# Patient Record
Sex: FEMALE | Race: BLACK OR AFRICAN AMERICAN | Employment: FULL TIME | URBAN - METROPOLITAN AREA
[De-identification: names, ages, dates, MRNs, and addresses within clinical notes are randomized per-mention and may not be internally consistent; named-entity substitution may affect disease eponyms.]

---

## 2018-06-29 ENCOUNTER — HOSPITAL ENCOUNTER (EMERGENCY)
Age: 33
Discharge: HOME | End: 2018-06-29
Attending: EMERGENCY MEDICINE
Payer: COMMERCIAL

## 2018-06-29 ENCOUNTER — APPOINTMENT (OUTPATIENT)
Dept: GENERAL RADIOLOGY | Age: 33
End: 2018-06-29
Attending: PHYSICIAN ASSISTANT
Payer: COMMERCIAL

## 2018-06-29 VITALS
SYSTOLIC BLOOD PRESSURE: 110 MMHG | TEMPERATURE: 98.1 F | OXYGEN SATURATION: 100 % | RESPIRATION RATE: 18 BRPM | DIASTOLIC BLOOD PRESSURE: 62 MMHG | HEART RATE: 88 BPM | BODY MASS INDEX: 47.98 KG/M2 | WEIGHT: 288 LBS | HEIGHT: 65 IN

## 2018-06-29 DIAGNOSIS — R42 VERTIGO: Primary | ICD-10-CM

## 2018-06-29 DIAGNOSIS — R55 NEAR SYNCOPE: ICD-10-CM

## 2018-06-29 LAB
ALBUMIN SERPL-MCNC: 3.1 G/DL (ref 3.4–5)
ALBUMIN/GLOB SERPL: 0.8 {RATIO} (ref 0.8–1.7)
ALP SERPL-CCNC: 78 U/L (ref 45–117)
ALT SERPL-CCNC: 24 U/L (ref 13–56)
ANION GAP SERPL CALC-SCNC: 3 MMOL/L (ref 3–18)
APPEARANCE UR: CLEAR
AST SERPL-CCNC: 18 U/L (ref 15–37)
BASOPHILS # BLD: 0 K/UL (ref 0–0.06)
BASOPHILS NFR BLD: 0 % (ref 0–2)
BILIRUB SERPL-MCNC: 0.1 MG/DL (ref 0.2–1)
BILIRUB UR QL: NEGATIVE
BUN SERPL-MCNC: 12 MG/DL (ref 7–18)
BUN/CREAT SERPL: 15 (ref 12–20)
CALCIUM SERPL-MCNC: 8.7 MG/DL (ref 8.5–10.1)
CHLORIDE SERPL-SCNC: 105 MMOL/L (ref 100–108)
CO2 SERPL-SCNC: 30 MMOL/L (ref 21–32)
COLOR UR: YELLOW
CREAT SERPL-MCNC: 0.82 MG/DL (ref 0.6–1.3)
DIFFERENTIAL METHOD BLD: ABNORMAL
EOSINOPHIL # BLD: 0 K/UL (ref 0–0.4)
EOSINOPHIL NFR BLD: 1 % (ref 0–5)
ERYTHROCYTE [DISTWIDTH] IN BLOOD BY AUTOMATED COUNT: 12.6 % (ref 11.6–14.5)
GLOBULIN SER CALC-MCNC: 4.1 G/DL (ref 2–4)
GLUCOSE SERPL-MCNC: 108 MG/DL (ref 74–99)
GLUCOSE UR STRIP.AUTO-MCNC: NEGATIVE MG/DL
HCG UR QL: NEGATIVE
HCT VFR BLD AUTO: 34.8 % (ref 35–45)
HGB BLD-MCNC: 11.1 G/DL (ref 12–16)
HGB UR QL STRIP: NEGATIVE
KETONES UR QL STRIP.AUTO: NEGATIVE MG/DL
LEUKOCYTE ESTERASE UR QL STRIP.AUTO: NEGATIVE
LYMPHOCYTES # BLD: 1.8 K/UL (ref 0.9–3.6)
LYMPHOCYTES NFR BLD: 41 % (ref 21–52)
MAGNESIUM SERPL-MCNC: 1.8 MG/DL (ref 1.6–2.6)
MCH RBC QN AUTO: 26.9 PG (ref 24–34)
MCHC RBC AUTO-ENTMCNC: 31.9 G/DL (ref 31–37)
MCV RBC AUTO: 84.3 FL (ref 74–97)
MONOCYTES # BLD: 0.4 K/UL (ref 0.05–1.2)
MONOCYTES NFR BLD: 9 % (ref 3–10)
NEUTS SEG # BLD: 2.2 K/UL (ref 1.8–8)
NEUTS SEG NFR BLD: 49 % (ref 40–73)
NITRITE UR QL STRIP.AUTO: NEGATIVE
PH UR STRIP: 5.5 [PH] (ref 5–8)
PLATELET # BLD AUTO: 316 K/UL (ref 135–420)
PMV BLD AUTO: 9.7 FL (ref 9.2–11.8)
POTASSIUM SERPL-SCNC: 3.9 MMOL/L (ref 3.5–5.5)
PROT SERPL-MCNC: 7.2 G/DL (ref 6.4–8.2)
PROT UR STRIP-MCNC: NEGATIVE MG/DL
RBC # BLD AUTO: 4.13 M/UL (ref 4.2–5.3)
SODIUM SERPL-SCNC: 138 MMOL/L (ref 136–145)
SP GR UR REFRACTOMETRY: 1.03 (ref 1–1.03)
UROBILINOGEN UR QL STRIP.AUTO: 1 EU/DL (ref 0.2–1)
WBC # BLD AUTO: 4.4 K/UL (ref 4.6–13.2)

## 2018-06-29 PROCEDURE — 93005 ELECTROCARDIOGRAM TRACING: CPT

## 2018-06-29 PROCEDURE — 74011250636 HC RX REV CODE- 250/636: Performed by: EMERGENCY MEDICINE

## 2018-06-29 PROCEDURE — 74011250636 HC RX REV CODE- 250/636: Performed by: PHYSICIAN ASSISTANT

## 2018-06-29 PROCEDURE — 81025 URINE PREGNANCY TEST: CPT | Performed by: PHYSICIAN ASSISTANT

## 2018-06-29 PROCEDURE — 71046 X-RAY EXAM CHEST 2 VIEWS: CPT

## 2018-06-29 PROCEDURE — 85025 COMPLETE CBC W/AUTO DIFF WBC: CPT | Performed by: PHYSICIAN ASSISTANT

## 2018-06-29 PROCEDURE — 83735 ASSAY OF MAGNESIUM: CPT | Performed by: PHYSICIAN ASSISTANT

## 2018-06-29 PROCEDURE — 96360 HYDRATION IV INFUSION INIT: CPT

## 2018-06-29 PROCEDURE — 99284 EMERGENCY DEPT VISIT MOD MDM: CPT

## 2018-06-29 PROCEDURE — 80053 COMPREHEN METABOLIC PANEL: CPT | Performed by: PHYSICIAN ASSISTANT

## 2018-06-29 PROCEDURE — 81003 URINALYSIS AUTO W/O SCOPE: CPT | Performed by: PHYSICIAN ASSISTANT

## 2018-06-29 RX ORDER — SODIUM CHLORIDE 9 MG/ML
1000 INJECTION, SOLUTION INTRAVENOUS ONCE
Status: COMPLETED | OUTPATIENT
Start: 2018-06-29 | End: 2018-06-29

## 2018-06-29 RX ORDER — MECLIZINE HCL 12.5 MG 12.5 MG/1
25 TABLET ORAL DAILY
Status: DISCONTINUED | OUTPATIENT
Start: 2018-06-30 | End: 2018-06-29

## 2018-06-29 RX ORDER — MECLIZINE HYDROCHLORIDE 25 MG/1
25 TABLET ORAL
Qty: 20 TAB | Refills: 0 | Status: SHIPPED | OUTPATIENT
Start: 2018-06-29 | End: 2018-09-12

## 2018-06-29 RX ORDER — MECLIZINE HCL 12.5 MG 12.5 MG/1
25 TABLET ORAL DAILY
Status: DISCONTINUED | OUTPATIENT
Start: 2018-06-29 | End: 2018-06-29 | Stop reason: HOSPADM

## 2018-06-29 RX ADMIN — MECLIZINE 25 MG: 12.5 TABLET ORAL at 16:41

## 2018-06-29 RX ADMIN — SODIUM CHLORIDE 1000 ML: 900 INJECTION, SOLUTION INTRAVENOUS at 16:42

## 2018-06-29 NOTE — DISCHARGE INSTRUCTIONS
Vertigo: Care Instructions  Your Care Instructions    Vertigo is the feeling that you or your surroundings are moving when there is no actual movement. It is often described as a feeling of spinning, whirling, falling, or tilting. Vertigo may make you vomit or feel nauseated. You may have trouble standing or walking and may lose your balance. Vertigo is often related to an inner ear problem, but it can have other more serious causes. If vertigo continues, you may need more tests to find its cause. Follow-up care is a key part of your treatment and safety. Be sure to make and go to all appointments, and call your doctor if you are having problems. It's also a good idea to know your test results and keep a list of the medicines you take. How can you care for yourself at home? · Do not lie flat on your back. Prop yourself up slightly. This may reduce the spinning feeling. Keep your eyes open. · Move slowly so that you do not fall. · If your doctor recommends medicine, take it exactly as directed. · Do not drive while you are having vertigo. Certain exercises, called Jackson-Daroff exercises, can help decrease vertigo. To do Jackson-Daroff exercises:  · Sit on the edge of a bed or sofa and quickly lie down on the side that causes the worst vertigo. Lie on your side with your ear down. · Stay in this position for at least 30 seconds or until the vertigo goes away. · Sit up. If this causes vertigo, wait for it to stop. · Repeat the procedure on the other side. · Repeat this 10 times. Do these exercises 2 times a day until the vertigo is gone. When should you call for help? Call 911 anytime you think you may need emergency care. For example, call if:  ? · You passed out (lost consciousness). ? · You have symptoms of a stroke. These may include:  ¨ Sudden numbness, tingling, weakness, or loss of movement in your face, arm, or leg, especially on only one side of your body.   ¨ Sudden vision changes. ¨ Sudden trouble speaking. ¨ Sudden confusion or trouble understanding simple statements. ¨ Sudden problems with walking or balance. ¨ A sudden, severe headache that is different from past headaches. ?Call your doctor now or seek immediate medical care if:  ? · Vertigo occurs with a fever, a headache, or ringing in your ears. ? · You have new or increased nausea and vomiting. ? Watch closely for changes in your health, and be sure to contact your doctor if:  ? · Vertigo gets worse or happens more often. ? · Vertigo has not gotten better after 2 weeks. Where can you learn more? Go to http://shannan-lois.info/. Enter R889 in the search box to learn more about \"Vertigo: Care Instructions. \"  Current as of: May 12, 2017  Content Version: 11.4  © 4923-7265 [employer]. Care instructions adapted under license by [employer] (which disclaims liability or warranty for this information). If you have questions about a medical condition or this instruction, always ask your healthcare professional. [place] [address] any warranty or liability for your use of this information.

## 2018-06-29 NOTE — ED PROVIDER NOTES
EMERGENCY DEPARTMENT HISTORY AND PHYSICAL EXAM    Date: 6/29/2018  Patient Name: Dasia Asher    History of Presenting Illness     Chief Complaint   Patient presents with    Dizziness    Headache         History Provided By: Patient    Chief Complaint: dizziness  Duration: 1 Weeks  Timing:  Intermittent  Location: neuro  Quality: n/a  Severity: Moderate  Modifying Factors: sitting down resolves symptoms  Associated Symptoms: posterior headache      Additional History (Context): Dasia Asher is a 29 y.o. female with No significant past medical history who presents with intermittent dizziness and posterior HA x 1 week. Pt states she feels like the room is spinning around her. The dizziness was worst today while pt was at work and pt felt like she was going to pass out, so she sat down and the dizziness resolved. Pt denies weakness, numbness, blurry vision, fever, chills, neck pain, N/V, CP, SOB, cough, abd pain, ear pain, ST, recent URI symptoms or any other concerns or symptoms. Pt states she has not been drinking fluids over the past several weeks and does not usually. PCP: Amanda Mandel MD        Past History     Past Medical History:  Past Medical History:   Diagnosis Date    Chest pain     Elevated blood pressure     Obesity     Refraction disorder     Supposed to wear glasses but does not. Past Surgical History:  History reviewed. No pertinent surgical history. Family History:  Family History   Problem Relation Age of Onset    Cancer Neg Hx     Diabetes Neg Hx     Heart Disease Neg Hx     Hypertension Neg Hx     Stroke Neg Hx        Social History:  Social History   Substance Use Topics    Smoking status: Never Smoker    Smokeless tobacco: Never Used    Alcohol use Yes      Comment: occasional       Allergies:  No Known Allergies      Review of Systems   Review of Systems   Constitutional: Negative. HENT: Negative. Eyes: Negative. Respiratory: Negative. Cardiovascular: Negative. Gastrointestinal: Negative. Endocrine: Negative. Genitourinary: Negative. Musculoskeletal: Negative. Skin: Negative. Neurological: Positive for dizziness and headaches. Negative for syncope, speech difficulty, weakness and numbness. All other systems reviewed and are negative. All Other Systems Negative  Physical Exam     Vitals:    06/29/18 1410 06/29/18 1443 06/29/18 1445 06/29/18 1446   BP: 114/75 98/47 105/63 110/62   Pulse: 88 80 86 88   Resp: 18 16 18 18   Temp: 98.1 °F (36.7 °C)      SpO2: 100% 100%     Weight: 130.6 kg (288 lb)      Height: 5' 5\" (1.651 m)        Physical Exam   Constitutional: She is oriented to person, place, and time. She appears well-developed and well-nourished. No distress. HENT:   Head: Normocephalic and atraumatic. Right Ear: Hearing, tympanic membrane, external ear and ear canal normal.   Left Ear: Hearing, tympanic membrane, external ear and ear canal normal.   Nose: Nose normal.   Mouth/Throat: Uvula is midline, oropharynx is clear and moist and mucous membranes are normal.   Eyes: Conjunctivae and EOM are normal. Pupils are equal, round, and reactive to light. Right eye exhibits no discharge. Left eye exhibits no discharge. Neck: Normal range of motion. Neck supple. Cardiovascular: Normal rate, regular rhythm and normal heart sounds. Pulmonary/Chest: Effort normal and breath sounds normal. She has no wheezes. Abdominal: Soft. Bowel sounds are normal. There is no tenderness. Musculoskeletal: Normal range of motion. She exhibits no edema, tenderness or deformity. Lymphadenopathy:     She has no cervical adenopathy. Neurological: She is alert and oriented to person, place, and time. She has normal strength. No cranial nerve deficit or sensory deficit. She displays a negative Romberg sign. Coordination and gait normal.   Skin: Skin is warm and dry. No rash noted. She is not diaphoretic.    Psychiatric: She has a normal mood and affect. Diagnostic Study Results     Labs -     Recent Results (from the past 12 hour(s))   URINALYSIS W/ RFLX MICROSCOPIC    Collection Time: 06/29/18  2:15 PM   Result Value Ref Range    Color YELLOW      Appearance CLEAR      Specific gravity 1.027 1.005 - 1.030      pH (UA) 5.5 5.0 - 8.0      Protein NEGATIVE  NEG mg/dL    Glucose NEGATIVE  NEG mg/dL    Ketone NEGATIVE  NEG mg/dL    Bilirubin NEGATIVE  NEG      Blood NEGATIVE  NEG      Urobilinogen 1.0 0.2 - 1.0 EU/dL    Nitrites NEGATIVE  NEG      Leukocyte Esterase NEGATIVE  NEG     HCG URINE, QL    Collection Time: 06/29/18  2:15 PM   Result Value Ref Range    HCG urine, QL NEGATIVE  NEG     EKG, 12 LEAD, INITIAL    Collection Time: 06/29/18  3:04 PM   Result Value Ref Range    Ventricular Rate 82 BPM    Atrial Rate 82 BPM    P-R Interval 178 ms    QRS Duration 102 ms    Q-T Interval 392 ms    QTC Calculation (Bezet) 457 ms    Calculated P Axis 55 degrees    Calculated R Axis 34 degrees    Calculated T Axis 24 degrees    Diagnosis       Normal sinus rhythm  Nonspecific T wave abnormality  Abnormal ECG  When compared with ECG of 14-MAR-2015 20:44,  No significant change was found     CBC WITH AUTOMATED DIFF    Collection Time: 06/29/18  3:07 PM   Result Value Ref Range    WBC 4.4 (L) 4.6 - 13.2 K/uL    RBC 4.13 (L) 4.20 - 5.30 M/uL    HGB 11.1 (L) 12.0 - 16.0 g/dL    HCT 34.8 (L) 35.0 - 45.0 %    MCV 84.3 74.0 - 97.0 FL    MCH 26.9 24.0 - 34.0 PG    MCHC 31.9 31.0 - 37.0 g/dL    RDW 12.6 11.6 - 14.5 %    PLATELET 341 955 - 098 K/uL    MPV 9.7 9.2 - 11.8 FL    NEUTROPHILS 49 40 - 73 %    LYMPHOCYTES 41 21 - 52 %    MONOCYTES 9 3 - 10 %    EOSINOPHILS 1 0 - 5 %    BASOPHILS 0 0 - 2 %    ABS. NEUTROPHILS 2.2 1.8 - 8.0 K/UL    ABS. LYMPHOCYTES 1.8 0.9 - 3.6 K/UL    ABS. MONOCYTES 0.4 0.05 - 1.2 K/UL    ABS. EOSINOPHILS 0.0 0.0 - 0.4 K/UL    ABS.  BASOPHILS 0.0 0.0 - 0.06 K/UL    DF AUTOMATED     MAGNESIUM    Collection Time: 06/29/18  3:07 PM   Result Value Ref Range    Magnesium 1.8 1.6 - 2.6 mg/dL   METABOLIC PANEL, COMPREHENSIVE    Collection Time: 06/29/18  3:07 PM   Result Value Ref Range    Sodium 138 136 - 145 mmol/L    Potassium 3.9 3.5 - 5.5 mmol/L    Chloride 105 100 - 108 mmol/L    CO2 30 21 - 32 mmol/L    Anion gap 3 3.0 - 18 mmol/L    Glucose 108 (H) 74 - 99 mg/dL    BUN 12 7.0 - 18 MG/DL    Creatinine 0.82 0.6 - 1.3 MG/DL    BUN/Creatinine ratio 15 12 - 20      GFR est AA >60 >60 ml/min/1.73m2    GFR est non-AA >60 >60 ml/min/1.73m2    Calcium 8.7 8.5 - 10.1 MG/DL    Bilirubin, total 0.1 (L) 0.2 - 1.0 MG/DL    ALT (SGPT) 24 13 - 56 U/L    AST (SGOT) 18 15 - 37 U/L    Alk. phosphatase 78 45 - 117 U/L    Protein, total 7.2 6.4 - 8.2 g/dL    Albumin 3.1 (L) 3.4 - 5.0 g/dL    Globulin 4.1 (H) 2.0 - 4.0 g/dL    A-G Ratio 0.8 0.8 - 1.7         Radiologic Studies -   XR CHEST PA LAT   Final Result        CT Results  (Last 48 hours)    None        CXR Results  (Last 48 hours)               06/29/18 1536  XR CHEST PA LAT Final result    Impression:  IMPRESSION:   1. No acute cardiopulmonary process. Narrative:  EXAM: Chest Radiographs       INDICATION: Dizziness. TECHNIQUE: PA and lateral views of the chest       COMPARISON: 6/23/2009 and 11/19/2006       FINDINGS: No pneumothorax identified. The lungs are clear. No infiltrates   identified. No effusions appreciated. The cardiomediastinal silhouette is   unremarkable. The pulmonary vascularity is unremarkable. The osseous structures   are unremarkable. Medical Decision Making   I am the first provider for this patient. I reviewed the vital signs, available nursing notes, past medical history, past surgical history, family history and social history. Vital Signs-Reviewed the patient's vital signs. Pulse Oximetry Analysis - 100% on RA      EKG: Interpreted by the EP.  Dr Sridevi Senior   Time Interpreted: 1409   Rate: 82   Rhythm: normal sinus   Interpretation: No STEMI   Comparison:    Records Reviewed: Nursing Notes and Old Medical Records    Procedures:  Procedures    Provider Notes (Medical Decision Making):   DDx: vertigo, cardiac etiology, dehydration, pregnancy (ectopic, iup), electrolyte imbalance, anemia, PE, vs other. Pt is a 30yoF who presents c/o dizziness and mild posterior HA which have been intermittent x 1 week, worse today when she almost passed out at work and this prompted her to come in. Exam is benign w/o any focal neuro findings. Will check labs, ekc, chest xray and re-eval. Pt agrees with plan. ERIC Broussard 3:07 PM     5:30 PM Labs reassuring. pt feeling much better, ambulatory to restroom w/o assistance, denies dizziness. rx for antivert prn. pcp f/u. Return precautions discussed. MED RECONCILIATION:  Current Facility-Administered Medications   Medication Dose Route Frequency    meclizine (ANTIVERT) tablet 25 mg  25 mg Oral DAILY     No current outpatient prescriptions on file. Disposition:  discharged    DISCHARGE NOTE:     Pt has been reexamined. Patient has no new complaints, changes, or physical findings. Care plan outlined and precautions discussed. Results of labs were reviewed with the patient. All medications were reviewed with the patient; will d/c home with antivert. All of pt's questions and concerns were addressed. Patient was instructed and agrees to follow up with pcp, as well as to return to the ED upon further deterioration. Patient is ready to go home. Follow-up Information     Follow up With Details Comments [address] [address] [phone] [address] [facility] EMERGENCY DEPT  If symptoms worsen [address] [facility]  [phone]          There are no discharge medications for this patient. Diagnosis     Clinical Impression:   1. Vertigo    2.  Near syncope

## 2018-06-29 NOTE — ED TRIAGE NOTES
Patient c/o feeling lightheaded and having headaches off and on all week. She states it's worse when standing and it feels like the room is spinning.

## 2018-06-30 LAB
ATRIAL RATE: 82 BPM
CALCULATED P AXIS, ECG09: 55 DEGREES
CALCULATED R AXIS, ECG10: 34 DEGREES
CALCULATED T AXIS, ECG11: 24 DEGREES
DIAGNOSIS, 93000: NORMAL
P-R INTERVAL, ECG05: 178 MS
Q-T INTERVAL, ECG07: 392 MS
QRS DURATION, ECG06: 102 MS
QTC CALCULATION (BEZET), ECG08: 457 MS
VENTRICULAR RATE, ECG03: 82 BPM

## 2018-09-12 ENCOUNTER — HOSPITAL ENCOUNTER (EMERGENCY)
Age: 33
Discharge: HOME | End: 2018-09-12
Attending: EMERGENCY MEDICINE
Payer: COMMERCIAL

## 2018-09-12 VITALS
BODY MASS INDEX: 48.65 KG/M2 | DIASTOLIC BLOOD PRESSURE: 82 MMHG | WEIGHT: 292 LBS | SYSTOLIC BLOOD PRESSURE: 138 MMHG | TEMPERATURE: 98.7 F | HEART RATE: 88 BPM | RESPIRATION RATE: 18 BRPM | OXYGEN SATURATION: 99 % | HEIGHT: 65 IN

## 2018-09-12 DIAGNOSIS — R20.2 PARESTHESIA: ICD-10-CM

## 2018-09-12 DIAGNOSIS — M54.12 CERVICAL RADICULOPATHY: Primary | ICD-10-CM

## 2018-09-12 DIAGNOSIS — M79.602 LEFT ARM PAIN: ICD-10-CM

## 2018-09-12 LAB
ANION GAP SERPL CALC-SCNC: 9 MMOL/L (ref 3–18)
APPEARANCE UR: CLEAR
BACTERIA URNS QL MICRO: ABNORMAL /HPF
BASOPHILS # BLD: 0 K/UL (ref 0–0.1)
BASOPHILS NFR BLD: 0 % (ref 0–2)
BILIRUB UR QL: NEGATIVE
BUN SERPL-MCNC: 10 MG/DL (ref 7–18)
BUN/CREAT SERPL: 15 (ref 12–20)
CALCIUM SERPL-MCNC: 8.9 MG/DL (ref 8.5–10.1)
CHLORIDE SERPL-SCNC: 103 MMOL/L (ref 100–108)
CK MB CFR SERPL CALC: ABNORMAL % (ref 0–4)
CK MB SERPL-MCNC: <1 NG/ML (ref 5–25)
CK SERPL-CCNC: 318 U/L (ref 26–192)
CO2 SERPL-SCNC: 25 MMOL/L (ref 21–32)
COLOR UR: YELLOW
CREAT SERPL-MCNC: 0.67 MG/DL (ref 0.6–1.3)
DIFFERENTIAL METHOD BLD: ABNORMAL
EOSINOPHIL # BLD: 0 K/UL (ref 0–0.4)
EOSINOPHIL NFR BLD: 1 % (ref 0–5)
EPITH CASTS URNS QL MICRO: ABNORMAL /LPF (ref 0–5)
ERYTHROCYTE [DISTWIDTH] IN BLOOD BY AUTOMATED COUNT: 12.7 % (ref 11.6–14.5)
GLUCOSE SERPL-MCNC: 94 MG/DL (ref 74–99)
GLUCOSE UR STRIP.AUTO-MCNC: NEGATIVE MG/DL
HCG UR QL: NEGATIVE
HCT VFR BLD AUTO: 35 % (ref 35–45)
HGB BLD-MCNC: 11.5 G/DL (ref 12–16)
HGB UR QL STRIP: NEGATIVE
KETONES UR QL STRIP.AUTO: NEGATIVE MG/DL
LEUKOCYTE ESTERASE UR QL STRIP.AUTO: ABNORMAL
LYMPHOCYTES # BLD: 2.7 K/UL (ref 0.9–3.6)
LYMPHOCYTES NFR BLD: 43 % (ref 21–52)
MCH RBC QN AUTO: 27.3 PG (ref 24–34)
MCHC RBC AUTO-ENTMCNC: 32.9 G/DL (ref 31–37)
MCV RBC AUTO: 82.9 FL (ref 74–97)
MONOCYTES # BLD: 0.6 K/UL (ref 0.05–1.2)
MONOCYTES NFR BLD: 10 % (ref 3–10)
NEUTS SEG # BLD: 3 K/UL (ref 1.8–8)
NEUTS SEG NFR BLD: 46 % (ref 40–73)
NITRITE UR QL STRIP.AUTO: NEGATIVE
PH UR STRIP: 6 [PH] (ref 5–8)
PLATELET # BLD AUTO: 330 K/UL (ref 135–420)
PMV BLD AUTO: 9.6 FL (ref 9.2–11.8)
POTASSIUM SERPL-SCNC: 3.7 MMOL/L (ref 3.5–5.5)
PROT UR STRIP-MCNC: NEGATIVE MG/DL
RBC # BLD AUTO: 4.22 M/UL (ref 4.2–5.3)
RBC #/AREA URNS HPF: NEGATIVE /HPF (ref 0–5)
SODIUM SERPL-SCNC: 137 MMOL/L (ref 136–145)
SP GR UR REFRACTOMETRY: 1 (ref 1–1.03)
TROPONIN I SERPL-MCNC: <0.02 NG/ML (ref 0–0.06)
UROBILINOGEN UR QL STRIP.AUTO: 0.2 EU/DL (ref 0.2–1)
WBC # BLD AUTO: 6.4 K/UL (ref 4.6–13.2)
WBC URNS QL MICRO: ABNORMAL /HPF (ref 0–4)

## 2018-09-12 PROCEDURE — 82550 ASSAY OF CK (CPK): CPT | Performed by: EMERGENCY MEDICINE

## 2018-09-12 PROCEDURE — 85025 COMPLETE CBC W/AUTO DIFF WBC: CPT | Performed by: EMERGENCY MEDICINE

## 2018-09-12 PROCEDURE — 81025 URINE PREGNANCY TEST: CPT | Performed by: EMERGENCY MEDICINE

## 2018-09-12 PROCEDURE — 81001 URINALYSIS AUTO W/SCOPE: CPT | Performed by: EMERGENCY MEDICINE

## 2018-09-12 PROCEDURE — 99284 EMERGENCY DEPT VISIT MOD MDM: CPT

## 2018-09-12 PROCEDURE — 80048 BASIC METABOLIC PNL TOTAL CA: CPT | Performed by: EMERGENCY MEDICINE

## 2018-09-12 PROCEDURE — 96374 THER/PROPH/DIAG INJ IV PUSH: CPT

## 2018-09-12 PROCEDURE — 74011250636 HC RX REV CODE- 250/636: Performed by: EMERGENCY MEDICINE

## 2018-09-12 PROCEDURE — 93005 ELECTROCARDIOGRAM TRACING: CPT

## 2018-09-12 RX ORDER — KETOROLAC TROMETHAMINE 10 MG/1
10 TABLET, FILM COATED ORAL
Qty: 14 TAB | Refills: 0 | Status: SHIPPED | OUTPATIENT
Start: 2018-09-12 | End: 2019-10-27

## 2018-09-12 RX ORDER — CYCLOBENZAPRINE HCL 5 MG
5-10 TABLET ORAL
Qty: 22 TAB | Refills: 0 | Status: SHIPPED | OUTPATIENT
Start: 2018-09-12 | End: 2018-09-19

## 2018-09-12 RX ORDER — KETOROLAC TROMETHAMINE 30 MG/ML
30 INJECTION, SOLUTION INTRAMUSCULAR; INTRAVENOUS
Status: COMPLETED | OUTPATIENT
Start: 2018-09-12 | End: 2018-09-12

## 2018-09-12 RX ADMIN — KETOROLAC TROMETHAMINE 30 MG: 30 INJECTION, SOLUTION INTRAMUSCULAR; INTRAVENOUS at 22:31

## 2018-09-13 LAB
ATRIAL RATE: 72 BPM
CALCULATED P AXIS, ECG09: 46 DEGREES
CALCULATED R AXIS, ECG10: 19 DEGREES
CALCULATED T AXIS, ECG11: 26 DEGREES
DIAGNOSIS, 93000: NORMAL
P-R INTERVAL, ECG05: 180 MS
Q-T INTERVAL, ECG07: 390 MS
QRS DURATION, ECG06: 104 MS
QTC CALCULATION (BEZET), ECG08: 427 MS
VENTRICULAR RATE, ECG03: 72 BPM

## 2018-09-13 NOTE — ED TRIAGE NOTES
Patient states that she was laying down on her bed in supine position today at 1600 when she began to experienced pain and tingling to left arm. She states tingling sensation lasted for approximately one hour and advises that she only has tingling in fingers of left hand at present. Denies any pain at present to left arm. She states sensation of tightness to upper left arm.

## 2018-09-13 NOTE — ED PROVIDER NOTES
EMERGENCY DEPARTMENT HISTORY AND PHYSICAL EXAM    10:01 PM      Date: 9/12/2018  Patient Name: Cari Melo    History of Presenting Illness     Chief Complaint   Patient presents with    Tingling         History Provided By: Patient    Chief Complaint: Tingling  Duration:  2-3 hours. Timing:  Intermittent  Location: Left arm  Quality: \"Pins and needles\"  Severity: Moderate  Modifying Factors: None. Associated Symptoms: Pain around her clavicle      Additional History (Context): Cari Melo is a 34 y.o. female with Chest pain, elevated blood pressure and vertigo who presents to the ED with c/o intermittent, moderate tingling onset 2-3 hours. Pt notes that she was lying down when her arm began to tingle and hurt. States the episodes last a couple minutes and are accompanied by pain in around her clavicle. She denies smoking, admits to occasional EtOH use. Associated sx include pain around her clavicle. Denies any fever, chills, cough, CP, SOB, abdominal pain, nausea, vomiting, diarrhea and any urinary sx. No other sx or complaints at this time. PCP: Mabel Franks MD        Past History     Past Medical History:  Past Medical History:   Diagnosis Date    Chest pain     Elevated blood pressure     Obesity     Refraction disorder     Supposed to wear glasses but does not.  Vertigo        Past Surgical History:  History reviewed. No pertinent surgical history. Family History:  Family History   Problem Relation Age of Onset    Cancer Neg Hx     Diabetes Neg Hx     Heart Disease Neg Hx     Hypertension Neg Hx     Stroke Neg Hx        Social History:  Social History   Substance Use Topics    Smoking status: Never Smoker    Smokeless tobacco: Never Used    Alcohol use Yes      Comment: occasional       Allergies:  No Known Allergies      Review of Systems       Review of Systems   Constitutional: Negative for fever. HENT: Negative for congestion.     Respiratory: Negative for cough and shortness of breath. Cardiovascular: Negative for chest pain. Gastrointestinal: Negative for abdominal pain and vomiting. Musculoskeletal: Negative for back pain. Positive for pain in clavicular area. Skin: Negative for rash. Neurological: Negative for light-headedness. Positive for left arm tingling     All other systems reviewed and are negative. Physical Exam     Visit Vitals    /82    Pulse 88    Temp 98.7 °F (37.1 °C)    Resp 18    Ht 5' 5\" (1.651 m)    Wt 132.5 kg (292 lb)    LMP 08/18/2018    SpO2 99%    BMI 48.59 kg/m2         Physical Exam   Constitutional: She is oriented to person, place, and time. She appears well-developed. HENT:   Head: Normocephalic. Mouth/Throat: Oropharynx is clear and moist.   Eyes: Pupils are equal, round, and reactive to light. Neck: Normal range of motion. Cardiovascular: Normal rate and normal heart sounds. No murmur heard. Pulmonary/Chest: Effort normal. She has no wheezes. She has no rales. Abdominal: Soft. There is no tenderness. Musculoskeletal: Normal range of motion. She exhibits no edema or tenderness. Neurological: She is alert and oriented to person, place, and time. No cranial nerve deficit or sensory deficit. Skin: Skin is warm and dry. Nursing note and vitals reviewed.         Diagnostic Study Results     Vitals:  Patient Vitals for the past 12 hrs:   Temp Pulse Resp BP SpO2   09/12/18 2323 - 88 - 138/82 99 %   09/12/18 2138 98.7 °F (37.1 °C) 82 18 (!) 144/104 99 %         Medications ordered:   Medications   ketorolac (TORADOL) injection 30 mg (30 mg IntraVENous Given 9/12/18 2231)         Lab findings:  Recent Results (from the past 12 hour(s))   EKG, 12 LEAD, INITIAL    Collection Time: 09/12/18 10:11 PM   Result Value Ref Range    Ventricular Rate 72 BPM    Atrial Rate 72 BPM    P-R Interval 180 ms    QRS Duration 104 ms    Q-T Interval 390 ms    QTC Calculation (Bezet) 427 ms Calculated P Axis 46 degrees    Calculated R Axis 19 degrees    Calculated T Axis 26 degrees    Diagnosis       Normal sinus rhythm  Normal ECG  When compared with ECG of 29-JUN-2018 15:04,  No significant change was found     CBC WITH AUTOMATED DIFF    Collection Time: 09/12/18 10:30 PM   Result Value Ref Range    WBC 6.4 4.6 - 13.2 K/uL    RBC 4.22 4.20 - 5.30 M/uL    HGB 11.5 (L) 12.0 - 16.0 g/dL    HCT 35.0 35.0 - 45.0 %    MCV 82.9 74.0 - 97.0 FL    MCH 27.3 24.0 - 34.0 PG    MCHC 32.9 31.0 - 37.0 g/dL    RDW 12.7 11.6 - 14.5 %    PLATELET 124 526 - 044 K/uL    MPV 9.6 9.2 - 11.8 FL    NEUTROPHILS 46 40 - 73 %    LYMPHOCYTES 43 21 - 52 %    MONOCYTES 10 3 - 10 %    EOSINOPHILS 1 0 - 5 %    BASOPHILS 0 0 - 2 %    ABS. NEUTROPHILS 3.0 1.8 - 8.0 K/UL    ABS. LYMPHOCYTES 2.7 0.9 - 3.6 K/UL    ABS. MONOCYTES 0.6 0.05 - 1.2 K/UL    ABS. EOSINOPHILS 0.0 0.0 - 0.4 K/UL    ABS.  BASOPHILS 0.0 0.0 - 0.1 K/UL    DF AUTOMATED     METABOLIC PANEL, BASIC    Collection Time: 09/12/18 10:30 PM   Result Value Ref Range    Sodium 137 136 - 145 mmol/L    Potassium 3.7 3.5 - 5.5 mmol/L    Chloride 103 100 - 108 mmol/L    CO2 25 21 - 32 mmol/L    Anion gap 9 3.0 - 18 mmol/L    Glucose 94 74 - 99 mg/dL    BUN 10 7.0 - 18 MG/DL    Creatinine 0.67 0.6 - 1.3 MG/DL    BUN/Creatinine ratio 15 12 - 20      GFR est AA >60 >60 ml/min/1.73m2    GFR est non-AA >60 >60 ml/min/1.73m2    Calcium 8.9 8.5 - 10.1 MG/DL   CARDIAC PANEL,(CK, CKMB & TROPONIN)    Collection Time: 09/12/18 10:30 PM   Result Value Ref Range     (H) 26 - 192 U/L    CK - MB <1.0 <3.6 ng/ml    CK-MB Index  0.0 - 4.0 %     CALCULATION NOT PERFORMED WHEN RESULT IS BELOW LINEAR LIMIT    Troponin-I, Qt. <0.02 0.00 - 0.06 NG/ML   HCG URINE, QL    Collection Time: 09/12/18 10:30 PM   Result Value Ref Range    HCG urine, QL NEGATIVE  NEG     URINALYSIS W/ RFLX MICROSCOPIC    Collection Time: 09/12/18 10:30 PM   Result Value Ref Range    Color YELLOW      Appearance CLEAR Specific gravity 1.005 1.005 - 1.030      pH (UA) 6.0 5.0 - 8.0      Protein NEGATIVE  NEG mg/dL    Glucose NEGATIVE  NEG mg/dL    Ketone NEGATIVE  NEG mg/dL    Bilirubin NEGATIVE  NEG      Blood NEGATIVE  NEG      Urobilinogen 0.2 0.2 - 1.0 EU/dL    Nitrites NEGATIVE  NEG      Leukocyte Esterase SMALL (A) NEG     URINE MICROSCOPIC ONLY    Collection Time: 09/12/18 10:30 PM   Result Value Ref Range    WBC 4 to 10 0 - 4 /hpf    RBC NEGATIVE  0 - 5 /hpf    Epithelial cells 1+ 0 - 5 /lpf    Bacteria FEW (A) NEG /hpf           X-Ray, CT or other radiology findings or impressions:  No orders to display       Progress notes, Consult notes or additional Procedure notes:     11:17 PM: Pt is feeling better. Agrees with discharge plan.nvi. No swelling. Not c/w cva/fx/dvt/infection. No emc. Stable for dc nad close f/u. Det ret inst given. Pt verb und and agrees w dc plan. Disposition:  Diagnosis:   1. Cervical radiculopathy    2. Paresthesia    3. Left arm pain        Disposition: Discharged. Follow-up Information     Follow up With Details Comments [address] Schedule an appointment as soon as possible for a visit in 2 days  [address] [place] [address]  [phone]           Discharge Medication List as of 9/12/2018 11:15 PM      START taking these medications    Details   cyclobenzaprine (FLEXERIL) 5 mg tablet Take 1-2 Tabs by mouth three (3) times daily as needed for Muscle Spasm(s) for up to 7 days. , Print, Disp-22 Tab, R-0      ketorolac (TORADOL) 10 mg tablet Take 1 Tab by mouth every eight (8) hours as needed for Pain., Print, Disp-14 Tab, R-0               Scribe Attestation     [employer] acting as a scribe for and in the presence of Leonard Howell MD      September 12, 2018 at 10:01 PM       Provider Attestation:      I personally performed the services described in the documentation, reviewed the documentation, as recorded by the scribe in my presence, and it accurately and completely records my words and actions.  September 12, 2018 at 10:01 PM - Yo Nicolas MD

## 2019-10-27 ENCOUNTER — HOSPITAL ENCOUNTER (EMERGENCY)
Age: 34
Discharge: HOME | End: 2019-10-27
Attending: EMERGENCY MEDICINE
Payer: COMMERCIAL

## 2019-10-27 VITALS
OXYGEN SATURATION: 100 % | TEMPERATURE: 98.7 F | RESPIRATION RATE: 20 BRPM | HEIGHT: 65 IN | DIASTOLIC BLOOD PRESSURE: 71 MMHG | HEART RATE: 86 BPM | BODY MASS INDEX: 47.15 KG/M2 | WEIGHT: 283 LBS | SYSTOLIC BLOOD PRESSURE: 121 MMHG

## 2019-10-27 DIAGNOSIS — N39.0 ACUTE UTI: Primary | ICD-10-CM

## 2019-10-27 LAB
APPEARANCE UR: ABNORMAL
BACTERIA URNS QL MICRO: ABNORMAL /HPF
BILIRUB UR QL: NEGATIVE
COLOR UR: YELLOW
EPITH CASTS URNS QL MICRO: ABNORMAL /LPF (ref 0–5)
GLUCOSE UR STRIP.AUTO-MCNC: NEGATIVE MG/DL
HCG UR QL: NEGATIVE
HGB UR QL STRIP: NEGATIVE
KETONES UR QL STRIP.AUTO: NEGATIVE MG/DL
LEUKOCYTE ESTERASE UR QL STRIP.AUTO: ABNORMAL
MUCOUS THREADS URNS QL MICRO: ABNORMAL /LPF
NITRITE UR QL STRIP.AUTO: NEGATIVE
PH UR STRIP: 7 [PH] (ref 5–8)
PROT UR STRIP-MCNC: NEGATIVE MG/DL
RBC #/AREA URNS HPF: ABNORMAL /HPF (ref 0–5)
SP GR UR REFRACTOMETRY: 1.02 (ref 1–1.03)
UROBILINOGEN UR QL STRIP.AUTO: 1 EU/DL (ref 0.2–1)
WBC URNS QL MICRO: ABNORMAL /HPF (ref 0–4)

## 2019-10-27 PROCEDURE — 81025 URINE PREGNANCY TEST: CPT

## 2019-10-27 PROCEDURE — 81001 URINALYSIS AUTO W/SCOPE: CPT

## 2019-10-27 PROCEDURE — 99283 EMERGENCY DEPT VISIT LOW MDM: CPT

## 2019-10-27 RX ORDER — NITROFURANTOIN 25; 75 MG/1; MG/1
100 CAPSULE ORAL 2 TIMES DAILY
Qty: 20 CAP | Refills: 0 | Status: SHIPPED | OUTPATIENT
Start: 2019-10-27 | End: 2019-10-27

## 2019-10-27 RX ORDER — NITROFURANTOIN 25; 75 MG/1; MG/1
100 CAPSULE ORAL 2 TIMES DAILY
Qty: 20 CAP | Refills: 0 | Status: SHIPPED | OUTPATIENT
Start: 2019-10-27 | End: 2019-11-06

## 2019-10-27 NOTE — LETTER
[facility] EMERGENCY DEPT 
[id number] [id number] [phone] Work/School Note Date: 10/27/2019 To Whom It May concern: 
 
Pk Johnston was seen and treated today in the emergency room by the following provider(s): 
Attending Provider: Shiloh Phan MD. Pk Johnston may return to work on 10/28/2019.  
 
Sincerely, 
 
 
 
 
Klaus Noriega RN

## 2019-10-27 NOTE — ED PROVIDER NOTES
HPI she complains of episodic generalized abdominal pains and soreness. She says her menstrual period is approximately 2 weeks late. She took a home pregnancy test about a week ago and it was negative. She denies any vaginal discharge or any change in bowel or bladder habits. She denies any nausea vomiting. She denies any back pain. She says abdominal pains come and go episodically and not associated with any particular activity or position. No other complaints given at this time. Past Medical History:   Diagnosis Date    Chest pain     Elevated blood pressure     Obesity     Refraction disorder     Supposed to wear glasses but does not.  Vertigo        History reviewed. No pertinent surgical history.       Family History:   Problem Relation Age of Onset    Cancer Neg Hx     Diabetes Neg Hx     Heart Disease Neg Hx     Hypertension Neg Hx     Stroke Neg Hx        Social History     Socioeconomic History    Marital status: SINGLE     Spouse name: Not on file    Number of children: Not on file    Years of education: Not on file    Highest education level: Not on file   Occupational History    Not on file   Social Needs    Financial resource strain: Not on file    Food insecurity:     Worry: Not on file     Inability: Not on file    Transportation needs:     Medical: Not on file     Non-medical: Not on file   Tobacco Use    Smoking status: Never Smoker    Smokeless tobacco: Never Used   Substance and Sexual Activity    Alcohol use: Yes     Comment: occasional    Drug use: No    Sexual activity: Yes     Partners: Male     Birth control/protection: None   Lifestyle    Physical activity:     Days per week: Not on file     Minutes per session: Not on file    Stress: Not on file   Relationships    Social connections:     Talks on phone: Not on file     Gets together: Not on file     Attends Mandaeism service: Not on file     Active member of club or organization: Not on file     Attends meetings of clubs or organizations: Not on file     Relationship status: Not on file    Intimate partner violence:     Fear of current or ex partner: Not on file     Emotionally abused: Not on file     Physically abused: Not on file     Forced sexual activity: Not on file   Other Topics Concern    Not on file   Social History Narrative    ** Merged History Encounter **              ALLERGIES: Patient has no known allergies. Review of Systems   Constitutional: Negative. HENT: Negative. Respiratory: Negative. Cardiovascular: Negative. Gastrointestinal: Positive for abdominal pain. Genitourinary: Negative. Musculoskeletal: Negative. Skin: Negative. Neurological: Negative. Hematological: Negative. Vitals:    10/27/19 0814   BP: 121/71   Pulse: 86   Resp: 20   Temp: 98.7 °F (37.1 °C)   SpO2: 100%   Weight: 128.4 kg (283 lb)   Height: 5' 5\" (1.651 m)            Physical Exam   Constitutional: She is oriented to person, place, and time. She appears well-developed. HENT:   Head: Normocephalic and atraumatic. Mouth/Throat: Oropharynx is clear and moist.   Eyes: Pupils are equal, round, and reactive to light. Conjunctivae and EOM are normal.   Neck: Normal range of motion. Neck supple. Cardiovascular: Normal rate and regular rhythm. Pulmonary/Chest: Effort normal and breath sounds normal.   Abdominal: Soft. Musculoskeletal: Normal range of motion. Neurological: She is alert and oriented to person, place, and time. Skin: Skin is warm and dry. Psychiatric: She has a normal mood and affect. Nursing note and vitals reviewed. MDM         Procedures            Patient understands and agrees with the disposition and follow-up plan.   Lincoln Story 8:50 AM

## 2019-10-27 NOTE — DISCHARGE INSTRUCTIONS

## 2020-02-04 ENCOUNTER — APPOINTMENT (OUTPATIENT)
Dept: GENERAL RADIOLOGY | Age: 35
End: 2020-02-04
Attending: EMERGENCY MEDICINE
Payer: COMMERCIAL

## 2020-02-04 ENCOUNTER — HOSPITAL ENCOUNTER (EMERGENCY)
Age: 35
Discharge: HOME | End: 2020-02-04
Attending: EMERGENCY MEDICINE
Payer: COMMERCIAL

## 2020-02-04 VITALS
WEIGHT: 285 LBS | TEMPERATURE: 98.7 F | RESPIRATION RATE: 18 BRPM | DIASTOLIC BLOOD PRESSURE: 67 MMHG | SYSTOLIC BLOOD PRESSURE: 124 MMHG | HEIGHT: 65 IN | HEART RATE: 66 BPM | BODY MASS INDEX: 47.48 KG/M2 | OXYGEN SATURATION: 100 %

## 2020-02-04 DIAGNOSIS — J98.01 ACUTE BRONCHOSPASM: ICD-10-CM

## 2020-02-04 DIAGNOSIS — J06.9 ACUTE UPPER RESPIRATORY INFECTION: Primary | ICD-10-CM

## 2020-02-04 DIAGNOSIS — J01.90 ACUTE NON-RECURRENT SINUSITIS, UNSPECIFIED LOCATION: ICD-10-CM

## 2020-02-04 PROCEDURE — 94640 AIRWAY INHALATION TREATMENT: CPT

## 2020-02-04 PROCEDURE — 99284 EMERGENCY DEPT VISIT MOD MDM: CPT

## 2020-02-04 PROCEDURE — 93005 ELECTROCARDIOGRAM TRACING: CPT

## 2020-02-04 PROCEDURE — 74011000250 HC RX REV CODE- 250: Performed by: EMERGENCY MEDICINE

## 2020-02-04 PROCEDURE — 74011250637 HC RX REV CODE- 250/637: Performed by: EMERGENCY MEDICINE

## 2020-02-04 PROCEDURE — 94762 N-INVAS EAR/PLS OXIMTRY CONT: CPT

## 2020-02-04 PROCEDURE — 71046 X-RAY EXAM CHEST 2 VIEWS: CPT

## 2020-02-04 RX ORDER — IBUPROFEN 600 MG/1
600 TABLET ORAL
Status: COMPLETED | OUTPATIENT
Start: 2020-02-04 | End: 2020-02-04

## 2020-02-04 RX ORDER — AMOXICILLIN 250 MG/1
500 CAPSULE ORAL
Status: COMPLETED | OUTPATIENT
Start: 2020-02-04 | End: 2020-02-04

## 2020-02-04 RX ORDER — AMOXICILLIN 500 MG/1
500 TABLET, FILM COATED ORAL 3 TIMES DAILY
Qty: 21 TAB | Refills: 0 | Status: SHIPPED | OUTPATIENT
Start: 2020-02-04 | End: 2020-02-11

## 2020-02-04 RX ORDER — IPRATROPIUM BROMIDE AND ALBUTEROL SULFATE 2.5; .5 MG/3ML; MG/3ML
3 SOLUTION RESPIRATORY (INHALATION)
Status: COMPLETED | OUTPATIENT
Start: 2020-02-04 | End: 2020-02-04

## 2020-02-04 RX ORDER — ALBUTEROL SULFATE 90 UG/1
1-2 AEROSOL, METERED RESPIRATORY (INHALATION)
Qty: 1 INHALER | Refills: 0 | Status: SHIPPED | OUTPATIENT
Start: 2020-02-04 | End: 2021-07-20

## 2020-02-04 RX ORDER — IBUPROFEN 600 MG/1
600 TABLET ORAL
Qty: 18 TAB | Refills: 0 | Status: SHIPPED | OUTPATIENT
Start: 2020-02-04 | End: 2021-03-11

## 2020-02-04 RX ADMIN — AMOXICILLIN 500 MG: 250 CAPSULE ORAL at 22:12

## 2020-02-04 RX ADMIN — IBUPROFEN 600 MG: 600 TABLET ORAL at 21:38

## 2020-02-04 RX ADMIN — IPRATROPIUM BROMIDE AND ALBUTEROL SULFATE 3 ML: .5; 3 SOLUTION RESPIRATORY (INHALATION) at 21:38

## 2020-02-05 LAB
ATRIAL RATE: 68 BPM
CALCULATED P AXIS, ECG09: 52 DEGREES
CALCULATED R AXIS, ECG10: 30 DEGREES
CALCULATED T AXIS, ECG11: 34 DEGREES
DIAGNOSIS, 93000: NORMAL
P-R INTERVAL, ECG05: 178 MS
Q-T INTERVAL, ECG07: 398 MS
QRS DURATION, ECG06: 90 MS
QTC CALCULATION (BEZET), ECG08: 423 MS
VENTRICULAR RATE, ECG03: 68 BPM

## 2020-02-05 NOTE — ED PROVIDER NOTES
Pt c/o cough/congestion. Says when having coughing episodes feels mildly sob and has mid chest pain. No pain or sob otherwise. Co nasal congestion/drip. Mild dizziness when leans forward only, none w standing or walking/sitting still. No weakness or numbness. Mild frontal ha. Took [place] sinus decongestant about 4 hours ago w mild relief. Cough non prod . No fever. Not pregnant per pt, declines testing. Past Medical History:   Diagnosis Date    Chest pain     Elevated blood pressure     Obesity     Refraction disorder     Supposed to wear glasses but does not.  Vertigo        History reviewed. No pertinent surgical history.       Family History:   Problem Relation Age of Onset    Cancer Neg Hx     Diabetes Neg Hx     Heart Disease Neg Hx     Hypertension Neg Hx     Stroke Neg Hx        Social History     Socioeconomic History    Marital status: SINGLE     Spouse name: Not on file    Number of children: Not on file    Years of education: Not on file    Highest education level: Not on file   Occupational History    Not on file   Social Needs    Financial resource strain: Not on file    Food insecurity:     Worry: Not on file     Inability: Not on file    Transportation needs:     Medical: Not on file     Non-medical: Not on file   Tobacco Use    Smoking status: Never Smoker    Smokeless tobacco: Never Used   Substance and Sexual Activity    Alcohol use: Yes     Comment: occasional    Drug use: No    Sexual activity: Yes     Partners: Male     Birth control/protection: None   Lifestyle    Physical activity:     Days per week: Not on file     Minutes per session: Not on file    Stress: Not on file   Relationships    Social connections:     Talks on phone: Not on file     Gets together: Not on file     Attends Episcopal service: Not on file     Active member of club or organization: Not on file     Attends meetings of clubs or organizations: Not on file     Relationship status: Not on file    Intimate partner violence:     Fear of current or ex partner: Not on file     Emotionally abused: Not on file     Physically abused: Not on file     Forced sexual activity: Not on file   Other Topics Concern    Not on file   Social History Narrative    ** Merged History Encounter **              ALLERGIES: Patient has no known allergies. Review of Systems   Constitutional: Negative for fever. HENT: Positive for congestion. Respiratory: Positive for cough. Cardiovascular: Negative for palpitations and leg swelling. Gastrointestinal: Negative for abdominal pain and vomiting. Musculoskeletal: Negative for back pain. Skin: Negative for rash. Neurological: Positive for headaches. Negative for light-headedness. All other systems reviewed and are negative. Vitals:    02/04/20 2111 02/04/20 2213   BP: 129/81 124/67   Pulse: 69 66   Resp: 20 18   Temp: 98.7 °F (37.1 °C)    SpO2: 97% 100%   Weight: 129.3 kg (285 lb)    Height: 5' 5\" (1.651 m)             Physical Exam  Vitals signs and nursing note reviewed. Constitutional:       Appearance: She is well-developed. She is not diaphoretic. HENT:      Head: Normocephalic and atraumatic. Eyes:      Pupils: Pupils are equal, round, and reactive to light. Neck:      Musculoskeletal: Normal range of motion. Cardiovascular:      Rate and Rhythm: Normal rate and regular rhythm. Heart sounds: No murmur. Pulmonary:      Effort: Pulmonary effort is normal.      Breath sounds: No wheezing. Comments: + bronchospastic cough    Abdominal:      Palpations: Abdomen is soft. Tenderness: There is no abdominal tenderness. Musculoskeletal:         General: No tenderness. Skin:     General: Skin is dry. Capillary Refill: Capillary refill takes less than 2 seconds. Findings: No rash. Neurological:      Mental Status: She is alert and oriented to person, place, and time.           [facility] Procedures          Vitals:  No data found. Medications ordered:   Medications   albuterol-ipratropium (DUO-NEB) 2.5 MG-0.5 MG/3 ML (3 mL Nebulization Given 2/4/20 2138)   ibuprofen (MOTRIN) tablet 600 mg (600 mg Oral Given 2/4/20 2138)   amoxicillin (AMOXIL) capsule 500 mg (500 mg Oral Given 2/4/20 2212)         Lab findings:  No results found for this or any previous visit (from the past 12 hour(s)). X-Ray, CT or other radiology findings or impressions:  XR CHEST PA LAT   Final Result   Impression:      Low lung volumes with no acute cardiopulmonary abnormalities. No significant   change since 6/29/2018. Progress notes, Consult notes or additional Procedure notes:   10:04 PM no sx's, declines further w/u, feels much better after duoneb, wants dc, declines further tx or evaluation. To dc per pt. No complaints. Nl ekg, stable for dc and rossi f/u. Detailed retinst given. Diagnosis:   1. Acute upper respiratory infection    2. Acute non-recurrent sinusitis, unspecified location    3. Acute bronchospasm        Disposition: home    Follow-up Information     Follow up With Specialties Details Why [id number] [facility] Schedule an appointment as soon as possible for a visit in 2 days  [address] [facility] [address] DEPT Emergency Medicine Go to As needed [address] [facility] [place]           Discharge Medication List as of 2/4/2020 10:17 PM      START taking these medications    Details   amoxicillin 500 mg tab Take 500 mg by mouth three (3) times daily for 7 days. , Print, Disp-21 Tab, R-0      albuterol (PROVENTIL HFA, VENTOLIN HFA, PROAIR HFA) 90 mcg/actuation inhaler Take 1-2 Puffs by inhalation every four (4) hours as needed for Wheezing., Print, Disp-1 Inhaler, R-0      ibuprofen (MOTRIN) 600 mg tablet Take 1 Tab by mouth every six (6) hours as needed for Pain., Print, Disp-18 Tab, R-0

## 2020-02-05 NOTE — DISCHARGE INSTRUCTIONS
Return for pain, fever not resolving with motrin or tylenol, shortness of breath, vomiting, decreased fluid intake, weakness, numbness, dizziness, or any change or concerns. Patient Education        Reactive Airway Disease: Care Instructions  Your Care Instructions    Reactive airway disease is a breathing problem that appears as wheezing, a whistling noise in your airways. It may be caused by a viral or bacterial infection, allergies, tobacco smoke, or something else in the environment. When you are around these triggers, your body releases chemicals that make the airways get tight. Reactive airway disease is a lot like asthma. Both can cause wheezing. But asthma is ongoing, while reactive airway disease may occur only now and then. Tests can be done to tell whether you have asthma. You may take the same medicines used to treat asthma. Good home care and follow-up care with your doctor can help you recover. Follow-up care is a key part of your treatment and safety. Be sure to make and go to all appointments, and call your doctor if you are having problems. It's also a good idea to know your test results and keep a list of the medicines you take. How can you care for yourself at home? · Take your medicines exactly as prescribed. Call your doctor if you think you are having a problem with your medicine. · Do not smoke or allow others to smoke around you. If you need help quitting, talk to your doctor about stop-smoking programs and medicines. These can increase your chances of quitting for good. · If you know what caused your wheezing (such as perfume or the odor of household chemicals), try to avoid it in the future. · Wash your hands several times a day, and consider using hand gels or wipes that contain alcohol. This can prevent colds and other infections. When should you call for help? Call 911 anytime you think you may need emergency care.  For example, call if:    · You have severe trouble breathing.    Watch closely for changes in your health, and be sure to contact your doctor if:    · You cough up yellow, dark brown, or bloody mucus.     · You have a fever.     · Your wheezing gets worse. Where can you learn more? Go to http://shannan-lois.info/. Enter S537 in the search box to learn more about \"Reactive Airway Disease: Care Instructions. \"  Current as of: June 9, 2019  Content Version: 12.2  © 9508-6579 [employer]. Care instructions adapted under license by [employer] (which disclaims liability or warranty for this information). If you have questions about a medical condition or this instruction, always ask your healthcare professional. [place] [address] any warranty or liability for your use of this information. Patient Education        Sinusitis: Care Instructions  Your Care Instructions    Sinusitis is an infection of the lining of the sinus cavities in your head. Sinusitis often follows a cold. It causes pain and pressure in your head and face. In most cases, sinusitis gets better on its own in 1 to 2 weeks. But some mild symptoms may last for several weeks. Sometimes antibiotics are needed. Follow-up care is a key part of your treatment and safety. Be sure to make and go to all appointments, and call your doctor if you are having problems. It's also a good idea to know your test results and keep a list of the medicines you take. How can you care for yourself at home? · Take an over-the-counter pain medicine, such as acetaminophen (Tylenol), ibuprofen (Advil, Motrin), or naproxen (Aleve). Read and follow all instructions on the label. · If the doctor prescribed antibiotics, take them as directed. Do not stop taking them just because you feel better. You need to take the full course of antibiotics. · Be careful when taking over-the-counter cold or flu medicines and Tylenol at the same time.  Many of these medicines have acetaminophen, which is Tylenol. Read the labels to make sure that you are not taking more than the recommended dose. Too much acetaminophen (Tylenol) can be harmful. · Breathe warm, moist air from a steamy shower, a hot bath, or a sink filled with hot water. Avoid cold, dry air. Using a humidifier in your home may help. Follow the directions for cleaning the machine. · Use saline (saltwater) nasal washes to help keep your nasal passages open and wash out mucus and bacteria. You can buy saline nose drops at a grocery store or [place]. Or you can make your own at home by adding 1 teaspoon of salt and 1 teaspoon of baking soda to 2 cups of distilled water. If you make your own, fill a bulb syringe with the solution, insert the tip into your nostril, and squeeze gently. Alexia Appiah your nose. · Put a hot, wet towel or a warm gel pack on your face 3 or 4 times a day for 5 to 10 minutes each time. · Try a decongestant nasal spray like oxymetazoline (Afrin). Do not use it for more than 3 days in a row. Using it for more than 3 days can make your congestion worse. When should you call for help? Call your doctor now or seek immediate medical care if:    · You have new or worse swelling or redness in your face or around your eyes.     · You have a new or higher fever.    Watch closely for changes in your health, and be sure to contact your doctor if:    · You have new or worse facial pain.     · The mucus from your nose becomes thicker (like pus) or has new blood in it.     · You are not getting better as expected. Where can you learn more? Go to http://shannan-lois.info/. Enter V336 in the search box to learn more about \"Sinusitis: Care Instructions. \"  Current as of: October 21, 2018  Content Version: 12.2  © 2943-7288 [employer]. Care instructions adapted under license by [employer] (which disclaims liability or warranty for this information).  If you have questions about a medical condition or this instruction, always ask your healthcare professional. [address] any warranty or liability for your use of this information. Patient Education        Upper Respiratory Infection (Cold): Care Instructions  Your Care Instructions    An upper respiratory infection, or URI, is an infection of the nose, sinuses, or throat. URIs are spread by coughs, sneezes, and direct contact. The common cold is the most frequent kind of URI. The flu and sinus infections are other kinds of URIs. Almost all URIs are caused by viruses. Antibiotics won't cure them. But you can treat most infections with home care. This may include drinking lots of fluids and taking over-the-counter pain medicine. You will probably feel better in 4 to 10 days. The doctor has checked you carefully, but problems can develop later. If you notice any problems or new symptoms, get medical treatment right away. Follow-up care is a key part of your treatment and safety. Be sure to make and go to all appointments, and call your doctor if you are having problems. It's also a good idea to know your test results and keep a list of the medicines you take. How can you care for yourself at home? · To prevent dehydration, drink plenty of fluids, enough so that your urine is light yellow or clear like water. Choose water and other caffeine-free clear liquids until you feel better. If you have kidney, heart, or liver disease and have to limit fluids, talk with your doctor before you increase the amount of fluids you drink. · Take an over-the-counter pain medicine, such as acetaminophen (Tylenol), ibuprofen (Advil, Motrin), or naproxen (Aleve). Read and follow all instructions on the label. · Before you use cough and cold medicines, check the label. These medicines may not be safe for young children or for people with certain health problems.   · Be careful when taking over-the-counter cold or flu medicines and Tylenol at the same time. Many of these medicines have acetaminophen, which is Tylenol. Read the labels to make sure that you are not taking more than the recommended dose. Too much acetaminophen (Tylenol) can be harmful. · Get plenty of rest.  · Do not smoke or allow others to smoke around you. If you need help quitting, talk to your doctor about stop-smoking programs and medicines. These can increase your chances of quitting for good. When should you call for help? Call 911 anytime you think you may need emergency care. For example, call if:    · You have severe trouble breathing.    Call your doctor now or seek immediate medical care if:    · You seem to be getting much sicker.     · You have new or worse trouble breathing.     · You have a new or higher fever.     · You have a new rash.    Watch closely for changes in your health, and be sure to contact your doctor if:    · You have a new symptom, such as a sore throat, an earache, or sinus pain.     · You cough more deeply or more often, especially if you notice more mucus or a change in the color of your mucus.     · You do not get better as expected. Where can you learn more? Go to http://shannan-lois.info/. Enter C331 in the search box to learn more about \"Upper Respiratory Infection (Cold): Care Instructions. \"  Current as of: June 9, 2019  Content Version: 12.2  © 7298-7255 [employer]. Care instructions adapted under license by [employer] (which disclaims liability or warranty for this information). If you have questions about a medical condition or this instruction, always ask your healthcare professional. [address] any warranty or liability for your use of this information.

## 2020-02-05 NOTE — ED TRIAGE NOTES
Cough/cold s/sx x 1 week.   Reports chest discomfort with inspirations and cough/intermittent dizziness/HA

## 2020-09-29 ENCOUNTER — OFFICE VISIT (OUTPATIENT)
Dept: FAMILY MEDICINE CLINIC | Age: 35
End: 2020-09-29

## 2020-09-29 DIAGNOSIS — Z91.199 NO-SHOW FOR APPOINTMENT: Primary | ICD-10-CM

## 2021-01-02 ENCOUNTER — APPOINTMENT (OUTPATIENT)
Dept: GENERAL RADIOLOGY | Age: 36
End: 2021-01-02
Attending: PHYSICIAN ASSISTANT
Payer: COMMERCIAL

## 2021-01-02 ENCOUNTER — HOSPITAL ENCOUNTER (EMERGENCY)
Age: 36
Discharge: HOME | End: 2021-01-02
Attending: EMERGENCY MEDICINE
Payer: COMMERCIAL

## 2021-01-02 VITALS
RESPIRATION RATE: 18 BRPM | WEIGHT: 290 LBS | HEART RATE: 106 BPM | TEMPERATURE: 98.7 F | OXYGEN SATURATION: 98 % | DIASTOLIC BLOOD PRESSURE: 110 MMHG | HEIGHT: 65 IN | SYSTOLIC BLOOD PRESSURE: 158 MMHG | BODY MASS INDEX: 48.32 KG/M2

## 2021-01-02 DIAGNOSIS — K59.00 CONSTIPATION, UNSPECIFIED CONSTIPATION TYPE: Primary | ICD-10-CM

## 2021-01-02 DIAGNOSIS — K62.5 RECTAL BLEEDING: ICD-10-CM

## 2021-01-02 LAB — HCG UR QL: NEGATIVE

## 2021-01-02 PROCEDURE — 99283 EMERGENCY DEPT VISIT LOW MDM: CPT

## 2021-01-02 PROCEDURE — 81025 URINE PREGNANCY TEST: CPT

## 2021-01-02 PROCEDURE — 74018 RADEX ABDOMEN 1 VIEW: CPT

## 2021-01-02 RX ORDER — HYDROCORTISONE 1 %
CREAM (GRAM) TOPICAL 2 TIMES DAILY
Qty: 30 G | Refills: 0 | Status: SHIPPED | OUTPATIENT
Start: 2021-01-02 | End: 2021-03-11

## 2021-01-02 RX ORDER — MAGNESIUM CITRATE
SOLUTION, ORAL ORAL
Qty: 1 BOTTLE | Refills: 0 | Status: SHIPPED | OUTPATIENT
Start: 2021-01-02 | End: 2021-03-11

## 2021-01-02 NOTE — ED PROVIDER NOTES
EMERGENCY DEPARTMENT HISTORY AND PHYSICAL EXAM      Date: 1/2/2021  Patient Name: Garrett Nolasco    History of Presenting Illness     Chief Complaint   Patient presents with    Anal Pain       History Provided By: Patient    HPI: Garrett Nolasco, 37 y.o. female PMHx significant for vertigo presents ambulatory to the ED. Pt reports constipation over the past few days with increased straining. Pt reports BRB on toilet paper when wiping. Denies history of hemorrhoids. Denies vomiting, abdominal pain. Denies previous abdominal surgeries. Patient has taken laxative without relief of symptoms. Patient reports she had a small BM today but her symptoms were not relieved. There are no other complaints, changes, or physical findings at this time. PCP: None    No current facility-administered medications on file prior to encounter. Current Outpatient Medications on File Prior to Encounter   Medication Sig Dispense Refill    albuterol (PROVENTIL HFA, VENTOLIN HFA, PROAIR HFA) 90 mcg/actuation inhaler Take 1-2 Puffs by inhalation every four (4) hours as needed for Wheezing. 1 Inhaler 0    ibuprofen (MOTRIN) 600 mg tablet Take 1 Tab by mouth every six (6) hours as needed for Pain. 18 Tab 0       Past History     Past Medical History:  Past Medical History:   Diagnosis Date    Chest pain     Elevated blood pressure     Obesity     Refraction disorder     Supposed to wear glasses but does not.  Vertigo        Past Surgical History:  History reviewed. No pertinent surgical history.     Family History:  Family History   Problem Relation Age of Onset    Cancer Neg Hx     Diabetes Neg Hx     Heart Disease Neg Hx     Hypertension Neg Hx     Stroke Neg Hx        Social History:  Social History     Tobacco Use    Smoking status: Never Smoker    Smokeless tobacco: Never Used   Substance Use Topics    Alcohol use: Yes     Comment: occasional    Drug use: No       Allergies:  No Known Allergies      Review of Systems   Review of Systems   Constitutional: Negative for chills and fever. Respiratory: Negative for shortness of breath. Cardiovascular: Negative for chest pain. Gastrointestinal: Positive for blood in stool and constipation. Negative for abdominal pain, nausea and vomiting. Genitourinary: Negative for flank pain. Musculoskeletal: Negative for back pain and myalgias. Skin: Negative for color change, pallor, rash and wound. Neurological: Negative for dizziness, weakness and light-headedness. All other systems reviewed and are negative. Physical Exam   Physical Exam  Vitals signs and nursing note reviewed. Exam conducted with a chaperone present. Constitutional:       General: She is not in acute distress. Appearance: She is well-developed. Comments: Pt well-appearing in NAd   HENT:      Head: Normocephalic and atraumatic. Eyes:      Conjunctiva/sclera: Conjunctivae normal.   Cardiovascular:      Rate and Rhythm: Normal rate and regular rhythm. Heart sounds: Normal heart sounds. Pulmonary:      Effort: Pulmonary effort is normal. No respiratory distress. Breath sounds: Normal breath sounds. Abdominal:      General: Bowel sounds are normal. There is no distension. Palpations: Abdomen is soft. Comments: Abdomen soft, nontender  Nondistended  No guarding or rigidity   Genitourinary:     Rectum: No external hemorrhoid. Comments: No external hemorrhoid visualized  Light brown stool in rectum  No anal fissure visualized  No BRB visualized on exam  Musculoskeletal: Normal range of motion. Skin:     General: Skin is warm. Findings: No rash. Neurological:      Mental Status: She is alert and oriented to person, place, and time.    Psychiatric:         Behavior: Behavior normal.         Diagnostic Study Results     Labs -     Recent Results (from the past 12 hour(s))   HCG URINE, QL    Collection Time: 01/02/21  5:35 PM   Result Value Ref Range    HCG urine, QL Negative NEG         Radiologic Studies -   XR ABD (KUB)    (Results Pending)     CT Results  (Last 48 hours)    None        CXR Results  (Last 48 hours)    None          Medical Decision Making   I am the first provider for this patient. I reviewed the vital signs, available nursing notes, past medical history, past surgical history, family history and social history. Vital Signs-Reviewed the patient's vital signs. Patient Vitals for the past 12 hrs:   Temp Pulse Resp BP SpO2   01/02/21 1543 -- (!) 106 -- -- 98 %   01/02/21 1539 98.7 °F (37.1 °C) (!) 125 18 (!) 158/110 98 %       Records Reviewed: Nursing Notes and Old Medical Records    Provider Notes (Medical Decision Making):   DDx: Impaction, External vs internal hemorrhoid, Constipation, Anal fissure    36 yo F who presents with constipation x 5 days with BRB on toilet paper. Unsure of hx of hemorrhoids. On exam, no hemorrhoid visualized. No impaction palpated. Light brown stool in vault. KUB shows no impaction. Will treat with magnesium citrate for constipation and discussed need for GI f/u. Pt non-toxic appearing in NAD. Pt stable for prompt outpatient follow-up with PCP in 1 to 2 days. Patient given strict instructions to return if symptoms worsen. ED Course:   Initial assessment performed. The patients presenting problems have been discussed, and they are in agreement with the care plan formulated and outlined with them. I have encouraged them to ask questions as they arise throughout their visit. Xray interpreted by self: no impaction visualized. Disposition:  Discussed lab and imaging results with pt along with dx and treatment plan. Discussed importance of PCP follow up. All questions answered. Pt voiced they understood. Return if sx worsen. PLAN:  1.    Discharge Medication List as of 1/2/2021  6:28 PM      START taking these medications    Details   magnesium citrate solution Take 1/3 of bottle every 8 hours until finished or until you have BM, Normal, Disp-1 Bottle, R-0      hydrocortisone (Preparation H Hydrocortisone) 1 % topical cream Apply  to affected area two (2) times a day. use thin layer, Normal, Disp-30 g, R-0         CONTINUE these medications which have NOT CHANGED    Details   albuterol (PROVENTIL HFA, VENTOLIN HFA, PROAIR HFA) 90 mcg/actuation inhaler Take 1-2 Puffs by inhalation every four (4) hours as needed for Wheezing., Print, Disp-1 Inhaler, R-0      ibuprofen (MOTRIN) 600 mg tablet Take 1 Tab by mouth every six (6) hours as needed for Pain., Print, Disp-18 Tab, R-0           2. Follow-up Information     Follow up With Specialties Details Why [facility]  Schedule an appointment as soon as possible for a visit in 1 day  [address] [place] [address]  [address] [place] [address] EMERGENCY DEPT Emergency Medicine  As needed, If symptoms worsen [address]  [phone]        Return to ED if worse     Diagnosis     Clinical Impression:   1. Constipation, unspecified constipation type    2. Rectal bleeding        Attestations:    ERIC Waterman    Please note that this dictation was completed with [employer], the computer voice recognition software. Quite often unanticipated grammatical, syntax, homophones, and other interpretive errors are inadvertently transcribed by the computer software. Please disregard these errors. Please excuse any errors that have escaped final proofreading. Thank you.

## 2021-01-03 NOTE — ED NOTES
Dev Marques is a 37 y.o. female that was discharged in good condition. The patients diagnosis, condition and treatment were explained to  patient and aftercare instructions were given. The patient verbalized understanding. Patient armband removed and shredded.

## 2021-01-03 NOTE — ED NOTES
Discharge Medication List as of 1/2/2021  6:28 PM      START taking these medications    Details   magnesium citrate solution Take 1/3 of bottle every 8 hours until finished or until you have BM, Normal, Disp-1 Bottle, R-0      hydrocortisone (Preparation H Hydrocortisone) 1 % topical cream Apply  to affected area two (2) times a day. use thin layer, Normal, Disp-30 g, R-0         CONTINUE these medications which have NOT CHANGED    Details   albuterol (PROVENTIL HFA, VENTOLIN HFA, PROAIR HFA) 90 mcg/actuation inhaler Take 1-2 Puffs by inhalation every four (4) hours as needed for Wheezing., Print, Disp-1 Inhaler, R-0      ibuprofen (MOTRIN) 600 mg tablet Take 1 Tab by mouth every six (6) hours as needed for Pain., Print, Disp-18 Tab, R-0           Patient armband removed and shredded. Prescriptions sent to patient pharmacy.

## 2021-07-20 ENCOUNTER — HOSPITAL ENCOUNTER (EMERGENCY)
Age: 36
Discharge: HOME | End: 2021-07-20
Attending: EMERGENCY MEDICINE
Payer: MEDICAID

## 2021-07-20 VITALS
SYSTOLIC BLOOD PRESSURE: 133 MMHG | HEART RATE: 89 BPM | WEIGHT: 289 LBS | OXYGEN SATURATION: 100 % | DIASTOLIC BLOOD PRESSURE: 84 MMHG | TEMPERATURE: 98.2 F | RESPIRATION RATE: 24 BRPM | BODY MASS INDEX: 48.09 KG/M2

## 2021-07-20 DIAGNOSIS — N30.00 ACUTE CYSTITIS WITHOUT HEMATURIA: Primary | ICD-10-CM

## 2021-07-20 LAB
APPEARANCE UR: ABNORMAL
BACTERIA URNS QL MICRO: ABNORMAL /HPF
BILIRUB UR QL: NEGATIVE
COLOR UR: YELLOW
EPITH CASTS URNS QL MICRO: ABNORMAL /LPF (ref 0–5)
GLUCOSE UR STRIP.AUTO-MCNC: NEGATIVE MG/DL
HCG UR QL: NEGATIVE
HGB UR QL STRIP: NEGATIVE
KETONES UR QL STRIP.AUTO: NEGATIVE MG/DL
LEUKOCYTE ESTERASE UR QL STRIP.AUTO: ABNORMAL
MUCOUS THREADS URNS QL MICRO: ABNORMAL /LPF
NITRITE UR QL STRIP.AUTO: NEGATIVE
PH UR STRIP: 6 [PH] (ref 5–8)
PROT UR STRIP-MCNC: ABNORMAL MG/DL
RBC #/AREA URNS HPF: NEGATIVE /HPF (ref 0–5)
SP GR UR REFRACTOMETRY: >1.03 (ref 1–1.03)
UROBILINOGEN UR QL STRIP.AUTO: 1 EU/DL (ref 0.2–1)
WBC URNS QL MICRO: ABNORMAL /HPF (ref 0–4)

## 2021-07-20 PROCEDURE — 99283 EMERGENCY DEPT VISIT LOW MDM: CPT

## 2021-07-20 PROCEDURE — 74011250637 HC RX REV CODE- 250/637: Performed by: EMERGENCY MEDICINE

## 2021-07-20 PROCEDURE — 81025 URINE PREGNANCY TEST: CPT

## 2021-07-20 PROCEDURE — 81001 URINALYSIS AUTO W/SCOPE: CPT

## 2021-07-20 RX ORDER — NITROFURANTOIN 25; 75 MG/1; MG/1
100 CAPSULE ORAL 2 TIMES DAILY
Qty: 6 CAPSULE | Refills: 0 | Status: SHIPPED | OUTPATIENT
Start: 2021-07-20 | End: 2021-07-23

## 2021-07-20 RX ORDER — NITROFURANTOIN 25; 75 MG/1; MG/1
100 CAPSULE ORAL
Status: COMPLETED | OUTPATIENT
Start: 2021-07-20 | End: 2021-07-20

## 2021-07-20 RX ADMIN — NITROFURANTOIN MONOHYDRATE/MACROCRYSTALLINE 100 MG: 25; 75 CAPSULE ORAL at 05:57

## 2021-07-20 NOTE — LETTER
NOTIFICATION RETURN TO WORK / SCHOOL 
 
7/20/2021 5:03 AM 
 
Ms. Elizabeth Valle [address] [facility] [address] To Whom It May Concern: 
 
Elizabeth Valle is currently under the care of [id number] [facility] EMERGENCY DEPT. Please excuse her from her shift beginning 7/19 to 7/20 (2021). If there are questions or concerns please have the patient contact our office. Sincerely, Farzad Gutiérrez RN

## 2021-07-20 NOTE — ED NOTES
Pt discharged/ambulatory to home. Affect calm/conversant, respirations regular/non labored/skin warm and dry. Instructed to follow up with a PCP, to take her medications as prescribed, and to return to ED for fever/pain/nausea/vomiting/other concerns. Pt voiced her understanding.

## 2021-07-20 NOTE — ED PROVIDER NOTES
EMERGENCY DEPARTMENT HISTORY AND PHYSICAL EXAM    5:14 AM  Date: 7/20/2021  Patient Name: Army Almeida    History of Presenting Illness     Chief Complaint   Patient presents with    Urinary Pain        History Provided By: Patient    HPI: Army Almeida is a 37 y.o. female with no significant past medical problems. Patient is presenting with dysuria for 1 day. Also reporting frequency. Denies abdominal pain or flank pain. No nausea or vomiting or other constitutional symptoms. No fever. No vaginal discharge or concern for STDs    Location:  Severity:  Timing/course: Onset/Duration:     PCP: None    Past History     Past Medical History:  Past Medical History:   Diagnosis Date    Anal fissure     Chest pain     Elevated blood pressure     GERD (gastroesophageal reflux disease)     Obesity     Rectal bleeding     Refraction disorder     Supposed to wear glasses but does not.  Tenesmus     Vertigo        Past Surgical History:  Past Surgical History:   Procedure Laterality Date    FLEXIBLE SIGMOIDOSCOPY N/A 3/12/2021    SIGMOIDOSCOPY FLEXIBLE with bxs performed by Deanna Fox MD at [facility] ENDOSCOPY       Family History:  Family History   Problem Relation Age of Onset    Cancer Neg Hx     Diabetes Neg Hx     Heart Disease Neg Hx     Hypertension Neg Hx     Stroke Neg Hx        Social History:  Social History     Tobacco Use    Smoking status: Never Smoker    Smokeless tobacco: Never Used   Substance Use Topics    Alcohol use: Yes     Comment: occasional    Drug use: No       Allergies:  No Known Allergies    Review of Systems   Review of Systems   Genitourinary: Positive for dysuria and frequency. All other systems reviewed and are negative. Physical Exam     Patient Vitals for the past 12 hrs:   Temp Pulse Resp BP SpO2   07/20/21 0449 98.2 °F (36.8 °C) 89 24 133/84 100 %       Physical Exam  Vitals and nursing note reviewed.    Constitutional:       Appearance: She is obese.   HENT:      Head: Normocephalic and atraumatic. Eyes:      Extraocular Movements: Extraocular movements intact. Cardiovascular:      Rate and Rhythm: Normal rate. Pulmonary:      Effort: Pulmonary effort is normal. No respiratory distress. Abdominal:      Palpations: Abdomen is soft. Tenderness: There is no abdominal tenderness. There is no right CVA tenderness or left CVA tenderness. Musculoskeletal:         General: Normal range of motion. Cervical back: Normal range of motion and neck supple. Skin:     General: Skin is warm. Neurological:      General: No focal deficit present. Mental Status: She is alert. Psychiatric:         Mood and Affect: Mood normal.         Diagnostic Study Results     Labs -  No results found for this or any previous visit (from the past 12 hour(s)). Radiologic Studies -   No results found. Medical Decision Making     ED Course: Progress Notes, Reevaluation, and Consults:    5:14 AM Initial assessment performed. The patients presenting problems have been discussed, and they/their family are in agreement with the care plan formulated and outlined with them. I have encouraged them to ask questions as they arise throughout their visit. Provider Notes (Medical Decision Making): 3year-old female presenting with frequency and dysuria for 1 day. Well-appearing on exam not in distress. Abdomen is benign. Vitals are stable. Likely cystitis. Will check a UA and treat accordingly. Procedures:     Critical Care Time:     Vital Signs-Reviewed the patient's vital signs. Reviewed pt's pulse ox reading. EKG: Interpreted by the EP.    Time Interpreted:    Rate:    Rhythm:    Interpretation:   Comparison:     Records Reviewed: Nursing Notes (Time of Review: 5:14 AM)  -I am the first provider for this patient.  -I reviewed the vital signs, available nursing notes, past medical history, past surgical history, family history and social history. Clinical Impression     Clinical Impression: No diagnosis found. Disposition: dc      This note was dictated utilizing voice recognition software which may lead to typographical errors. I apologize in advance if the situation occurs. If questions arise please do not hesitate to contact me or call our department.     Maximino Vázquez MD  5:14 AM

## 2021-08-09 ENCOUNTER — HOSPITAL ENCOUNTER (EMERGENCY)
Age: 36
Discharge: HOME | End: 2021-08-09
Attending: EMERGENCY MEDICINE
Payer: COMMERCIAL

## 2021-08-09 VITALS
HEIGHT: 65 IN | BODY MASS INDEX: 48.15 KG/M2 | OXYGEN SATURATION: 99 % | SYSTOLIC BLOOD PRESSURE: 134 MMHG | TEMPERATURE: 98 F | HEART RATE: 87 BPM | DIASTOLIC BLOOD PRESSURE: 84 MMHG | WEIGHT: 289 LBS | RESPIRATION RATE: 16 BRPM

## 2021-08-09 DIAGNOSIS — N76.0 VAGINITIS AND VULVOVAGINITIS: Primary | ICD-10-CM

## 2021-08-09 LAB
APPEARANCE UR: CLEAR
BILIRUB UR QL: NEGATIVE
COLOR UR: YELLOW
GLUCOSE UR STRIP.AUTO-MCNC: NEGATIVE MG/DL
HCG UR QL: NEGATIVE
HGB UR QL STRIP: NEGATIVE
KETONES UR QL STRIP.AUTO: ABNORMAL MG/DL
LEUKOCYTE ESTERASE UR QL STRIP.AUTO: NEGATIVE
NITRITE UR QL STRIP.AUTO: NEGATIVE
PH UR STRIP: 5.5 [PH] (ref 5–8)
PROT UR STRIP-MCNC: NEGATIVE MG/DL
SERVICE CMNT-IMP: NORMAL
SP GR UR REFRACTOMETRY: >1.03 (ref 1–1.03)
UROBILINOGEN UR QL STRIP.AUTO: 1 EU/DL (ref 0.2–1)
WET PREP GENITAL: NORMAL

## 2021-08-09 PROCEDURE — 99283 EMERGENCY DEPT VISIT LOW MDM: CPT

## 2021-08-09 PROCEDURE — 87210 SMEAR WET MOUNT SALINE/INK: CPT

## 2021-08-09 PROCEDURE — 81025 URINE PREGNANCY TEST: CPT

## 2021-08-09 PROCEDURE — 87591 N.GONORRHOEAE DNA AMP PROB: CPT

## 2021-08-09 PROCEDURE — 81003 URINALYSIS AUTO W/O SCOPE: CPT

## 2021-08-09 RX ORDER — CHLORPHENIRAMINE MALEATE 4 MG
TABLET ORAL 2 TIMES DAILY
Qty: 1 TUBE | Refills: 0 | Status: SHIPPED | OUTPATIENT
Start: 2021-08-09 | End: 2021-09-08

## 2021-08-09 RX ORDER — FLUCONAZOLE 150 MG/1
150 TABLET ORAL
Qty: 1 TABLET | Refills: 0 | Status: SHIPPED | OUTPATIENT
Start: 2021-08-09 | End: 2021-08-09

## 2021-08-09 NOTE — ED PROVIDER NOTES
The patient is a 30-year-old woman who denies any past medical history who presents to the ED today with vaginal itching and irritation along with white bumps on her her legs. She states that it began Friday. She does state that she has a vaginal discharge. She denies any dysuria. The patient states that she does have a prior history of yeast infections but has never had bacterial vaginosis. She denies fevers, chills, nausea, vomiting, or abdominal pain. Past Medical History:   Diagnosis Date    Anal fissure     Chest pain     Elevated blood pressure     GERD (gastroesophageal reflux disease)     Obesity     Rectal bleeding     Refraction disorder     Supposed to wear glasses but does not.      Tenesmus     Vertigo        Past Surgical History:   Procedure Laterality Date    FLEXIBLE SIGMOIDOSCOPY N/A 3/12/2021    SIGMOIDOSCOPY FLEXIBLE with bxs performed by Nola Pelaez MD at [facility] ENDOSCOPY         Family History:   Problem Relation Age of Onset    Cancer Neg Hx     Diabetes Neg Hx     Heart Disease Neg Hx     Hypertension Neg Hx     Stroke Neg Hx        Social History     Socioeconomic History    Marital status: SINGLE     Spouse name: Not on file    Number of children: Not on file    Years of education: Not on file    Highest education level: Not on file   Occupational History    Not on file   Tobacco Use    Smoking status: Never Smoker    Smokeless tobacco: Never Used   Substance and Sexual Activity    Alcohol use: Yes     Comment: occasional    Drug use: No    Sexual activity: Yes     Partners: Male     Birth control/protection: None   Other Topics Concern    Not on file   Social History Narrative    ** Merged History Encounter **          Social Determinants of Health     Financial Resource Strain:     Difficulty of Paying Living Expenses:    Food Insecurity:     Worried About Running Out of Food in the Last Year:     Owen of Food in the Last Year: Transportation Needs:     Lack of Transportation (Medical):  Lack of Transportation (Non-Medical):    Physical Activity:     Days of Exercise per Week:     Minutes of Exercise per Session:    Stress:     Feeling of Stress :    Social Connections:     Frequency of Communication with Friends and Family:     Frequency of Social Gatherings with Friends and Family:     Attends Holiness Services:     Active Member of Clubs or Organizations:     Attends Club or Organization Meetings:     Marital Status:    Intimate Partner Violence:     Fear of Current or Ex-Partner:     Emotionally Abused:     Physically Abused:     Sexually Abused: ALLERGIES: Patient has no known allergies. Review of Systems   All other systems reviewed and are negative. Vitals:    08/09/21 0308   BP: 134/84   Pulse: 87   Resp: 16   Temp: 98 °F (36.7 °C)   SpO2: 99%   Weight: 131.1 kg (289 lb)   Height: 5' 5\" (1.651 m)            Physical Exam  Vitals and nursing note reviewed. Constitutional:       Appearance: She is obese. HENT:      Head: Normocephalic and atraumatic. Right Ear: External ear normal.      Left Ear: External ear normal.      Nose: Nose normal.      Mouth/Throat:      Mouth: Mucous membranes are moist.      Pharynx: Oropharynx is clear. Eyes:      Extraocular Movements: Extraocular movements intact. Conjunctiva/sclera: Conjunctivae normal.      Pupils: Pupils are equal, round, and reactive to light. Cardiovascular:      Rate and Rhythm: Normal rate and regular rhythm. Pulses: Normal pulses. Heart sounds: Normal heart sounds. Pulmonary:      Effort: Pulmonary effort is normal.      Breath sounds: Normal breath sounds. Abdominal:      General: Abdomen is flat. Bowel sounds are normal.      Palpations: Abdomen is soft. Genitourinary:     Vagina: Vaginal discharge present. Comments:  Whitish discharge on the vulva, some white spots on the anterior portion of the upper thighs. Musculoskeletal:         General: Normal range of motion. Cervical back: Normal range of motion and neck supple. Skin:     General: Skin is warm and dry. Capillary Refill: Capillary refill takes less than 2 seconds. Neurological:      General: No focal deficit present. Mental Status: She is alert and oriented to person, place, and time. Psychiatric:         Mood and Affect: Mood normal.         Behavior: Behavior normal.         Thought Content: Thought content normal.         Judgment: Judgment normal.        Recent Results (from the past 12 hour(s))   WET PREP    Collection Time: 08/09/21  3:16 AM    Specimen: Vagina   Result Value Ref Range    Special Requests: NO SPECIAL REQUESTS      Wet prep NO YEAST,TRICHOMONAS OR CLUE CELLS NOTED     URINALYSIS W/ RFLX MICROSCOPIC    Collection Time: 08/09/21  3:16 AM   Result Value Ref Range    Color YELLOW      Appearance CLEAR      Specific gravity >1.030 (H) 1.005 - 1.030    pH (UA) 5.5 5.0 - 8.0      Protein Negative NEG mg/dL    Glucose Negative NEG mg/dL    Ketone TRACE (A) NEG mg/dL    Bilirubin Negative NEG      Blood Negative NEG      Urobilinogen 1.0 0.2 - 1.0 EU/dL    Nitrites Negative NEG      Leukocyte Esterase Negative NEG     HCG URINE, QL    Collection Time: 08/09/21  3:16 AM   Result Value Ref Range    HCG urine, QL Negative NEG         MDM  Number of Diagnoses or Management Options  Diagnosis management comments: Patient is a 82-year-old woman who presents to the ED today with vaginal irritation and vaginal discharge along with white spots and irritation on her anterior thighs. Her wet prep is negative, however clinically she does have a dried white discharge. She will be discharged home with an antifungal cream as well as Diflucan 150 mg orally. She will be advised to follow-up with her primary care physician in 2 to 3 days. Return precautions have been given.          Procedures

## 2021-08-11 LAB
C TRACH RRNA SPEC QL NAA+PROBE: NEGATIVE
N GONORRHOEA RRNA SPEC QL NAA+PROBE: NEGATIVE
PLEASE NOTE:, 188601: NORMAL
SPECIMEN SOURCE: NORMAL

## 2021-10-29 ENCOUNTER — HOSPITAL ENCOUNTER (EMERGENCY)
Age: 36
Discharge: HOME | End: 2021-10-29
Attending: STUDENT IN AN ORGANIZED HEALTH CARE EDUCATION/TRAINING PROGRAM
Payer: COMMERCIAL

## 2021-10-29 VITALS
DIASTOLIC BLOOD PRESSURE: 114 MMHG | HEART RATE: 90 BPM | SYSTOLIC BLOOD PRESSURE: 148 MMHG | OXYGEN SATURATION: 100 % | TEMPERATURE: 97.9 F | RESPIRATION RATE: 18 BRPM

## 2021-10-29 DIAGNOSIS — R09.82 POST-NASAL DRIP: ICD-10-CM

## 2021-10-29 DIAGNOSIS — J02.9 SORE THROAT: Primary | ICD-10-CM

## 2021-10-29 PROCEDURE — 74011250636 HC RX REV CODE- 250/636: Performed by: STUDENT IN AN ORGANIZED HEALTH CARE EDUCATION/TRAINING PROGRAM

## 2021-10-29 PROCEDURE — 99283 EMERGENCY DEPT VISIT LOW MDM: CPT

## 2021-10-29 RX ORDER — DEXAMETHASONE 4 MG/1
4 TABLET ORAL ONCE
Status: COMPLETED | OUTPATIENT
Start: 2021-10-29 | End: 2021-10-29

## 2021-10-29 RX ADMIN — DEXAMETHASONE 4 MG: 4 TABLET ORAL at 13:03

## 2021-10-29 NOTE — ED TRIAGE NOTES
PT.AOX4, STEADY GAIT, NAD, C/O SORE THROAT X 1 WEEK, + THROAT DRAINAGE, HURTS TO SWALLOW BUT DOES NOT HAVE DIFFICULTY SWALLOWING OR BREATHING, - COVID RESULT WITHIN THE WEEK, NO NVD/FEVER/CHILLS

## 2021-10-29 NOTE — ED PROVIDER NOTES
EMERGENCY DEPARTMENT HISTORY AND PHYSICAL EXAM      Date: 10/29/2021  Patient Name: Kamar Ferreira    History of Presenting Illness     Chief Complaint   Patient presents with    Sore Throat       History (Context): Kamar Ferreira is a 31 y.o. female with no significant past medical history comes into the ED today due to sore throat. Patient states symptoms began a few days ago. Have worsened since onset. She denies any alleviating factors but states worsened with PO intake. She did not take any medication for treatment prior to arrival. She does admit to associated post nasal drip but she has not had similar symptoms in the past. She states she is otherwise healthy and denies fever, sinus congestion, chills, or cough. PCP: None        Past History     Past Medical History:   Past Medical History:   Diagnosis Date    Anal fissure     Chest pain     Elevated blood pressure     GERD (gastroesophageal reflux disease)     Obesity     Rectal bleeding     Refraction disorder     Supposed to wear glasses but does not.  Tenesmus     Vertigo        Past Surgical History:  Past Surgical History:   Procedure Laterality Date    FLEXIBLE SIGMOIDOSCOPY N/A 3/12/2021    SIGMOIDOSCOPY FLEXIBLE with bxs performed by Saran Charles MD at [facility] ENDOSCOPY       Family History:  Family History   Problem Relation Age of Onset    Cancer Neg Hx     Diabetes Neg Hx     Heart Disease Neg Hx     Hypertension Neg Hx     Stroke Neg Hx        Social History:   Social History     Tobacco Use    Smoking status: Never Smoker    Smokeless tobacco: Never Used   Substance Use Topics    Alcohol use: Yes     Comment: occasional    Drug use: No       Allergies:  No Known Allergies    PMH, PSH, family history, social history, allergies reviewed with the patient with significant items noted above. Review of Systems   Review of Systems   Constitutional: Negative for chills and fever.    HENT: Positive for postnasal drip and sore throat. Negative for congestion, drooling, rhinorrhea, sinus pressure, sinus pain, trouble swallowing and voice change. Eyes: Negative for visual disturbance. Respiratory: Negative for shortness of breath. Cardiovascular: Negative for chest pain. Gastrointestinal: Negative for abdominal pain, nausea and vomiting. Genitourinary: Negative for difficulty urinating. Musculoskeletal: Negative for myalgias. Skin: Negative for rash. Neurological: Negative for headaches. Physical Exam     Vitals:    10/29/21 1255   BP: (!) 148/114   Pulse: 90   Resp: 18   Temp: 97.9 °F (36.6 °C)   SpO2: 100%       Physical Exam  Vitals and nursing note reviewed. Constitutional:       General: She is not in acute distress. Appearance: Normal appearance. HENT:      Head: Normocephalic and atraumatic. Mouth/Throat:      Mouth: Mucous membranes are moist. No oral lesions. Pharynx: Uvula midline. Posterior oropharyngeal erythema (mild posterior pharynx) present. No pharyngeal swelling or oropharyngeal exudate. Eyes:      General: No scleral icterus. Conjunctiva/sclera: Conjunctivae normal.   Cardiovascular:      Rate and Rhythm: Normal rate and regular rhythm. Comments: Normal peripheral perfusion  Pulmonary:      Effort: Pulmonary effort is normal.      Breath sounds: Normal breath sounds. Abdominal:      General: There is no distension. Palpations: Abdomen is soft. Tenderness: There is no abdominal tenderness. Musculoskeletal:         General: No deformity. Normal range of motion. Cervical back: Normal range of motion and neck supple. Skin:     General: Skin is warm and dry. Findings: No rash. Neurological:      General: No focal deficit present. Mental Status: She is alert and oriented to person, place, and time. Mental status is at baseline. Psychiatric:         Mood and Affect: Mood normal.         Thought Content:  Thought content normal. Diagnostic Study Results     Labs -   No results found for this or any previous visit (from the past 12 hour(s)). Labs Reviewed - No data to display    Radiologic Studies -   No orders to display     CT Results  (Last 48 hours)    None        CXR Results  (Last 48 hours)    None          The laboratory results, imaging results, and other diagnostic exams were reviewed in the EMR. Medical Decision Making   I am the first provider for this patient. I reviewed the vital signs, available nursing notes, past medical history, past surgical history, family history and social history. Vital Signs-Reviewed the patient's vital signs. Records Reviewed: Personally, on initial evaluation    MDM:   Garrett Nolasco presents with complaint of Sore throat  DDX includes but is not limited to: bacterial pharyngitis, viral pharyngitis, post nasal drip    Patient overall well appearing and in NAD. Patient without evidence of bacterial pharyngitis on exam. Will provide patient with decadron for tx of her symptoms. Will discharge patient home with return precautions and follow up recommendations. Patient verbalized understanding and is without any further questions. Orders as below:  Orders Placed This Encounter    dexAMETHasone (DECADRON) tablet 4 mg        ED Course:            Procedures:  Procedures        Diagnosis and Disposition     CLINICAL IMPRESSION:  1. Sore throat    2. Post-nasal drip      There are no discharge medications for this patient. Disposition: Home    Patient condition at time of disposition: Stable    DISCHARGE NOTE:   Pt has been reexamined. Patient has no new complaints, changes, or physical findings. Care plan outlined and precautions discussed. Results were reviewed with the patient. All medications were reviewed with the patient. All of pt's questions and concerns were addressed.   Alarm symptoms and return precautions associated with chief complaint and evaluation were reviewed with the patient in detail. The patient demonstrated adequate understanding. Patient was instructed to follow up with PCP, as well as strict return precautions to the ED upon further deterioration. Patient is ready to go home. The patient is happy with this plan      Dragon Disclaimer     Please note that this dictation was completed with [employer], the computer voice recognition software. Quite often unanticipated grammatical, syntax, homophones, and other interpretive errors are inadvertently transcribed by the computer software. Please disregard these errors. Please excuse any errors that have escaped final proofreading. Cynthia SANTOS.

## 2021-11-01 ENCOUNTER — HOSPITAL ENCOUNTER (EMERGENCY)
Age: 36
Discharge: HOME | End: 2021-11-01
Attending: EMERGENCY MEDICINE
Payer: COMMERCIAL

## 2021-11-01 VITALS
BODY MASS INDEX: 44.98 KG/M2 | WEIGHT: 270 LBS | SYSTOLIC BLOOD PRESSURE: 151 MMHG | HEART RATE: 78 BPM | RESPIRATION RATE: 20 BRPM | TEMPERATURE: 98.7 F | HEIGHT: 65 IN | DIASTOLIC BLOOD PRESSURE: 106 MMHG | OXYGEN SATURATION: 100 %

## 2021-11-01 DIAGNOSIS — J06.9 UPPER RESPIRATORY TRACT INFECTION, UNSPECIFIED TYPE: ICD-10-CM

## 2021-11-01 DIAGNOSIS — Z20.822 SUSPECTED COVID-19 VIRUS INFECTION: Primary | ICD-10-CM

## 2021-11-01 LAB
DEPRECATED S PYO AG THROAT QL EIA: NEGATIVE
SARS-COV-2, COV2: NORMAL

## 2021-11-01 PROCEDURE — 87070 CULTURE OTHR SPECIMN AEROBIC: CPT

## 2021-11-01 PROCEDURE — 87880 STREP A ASSAY W/OPTIC: CPT

## 2021-11-01 PROCEDURE — U0005 INFEC AGEN DETEC AMPLI PROBE: HCPCS

## 2021-11-01 PROCEDURE — 99282 EMERGENCY DEPT VISIT SF MDM: CPT

## 2021-11-01 RX ORDER — GUAIFENESIN AND PSEUDOEPHEDRINE HCL 1200; 120 MG/1; MG/1
1 TABLET, EXTENDED RELEASE ORAL
Qty: 28 TABLET | Refills: 0 | Status: SHIPPED | OUTPATIENT
Start: 2021-11-01 | End: 2022-01-26 | Stop reason: ALTCHOICE

## 2021-11-01 RX ORDER — PREDNISONE 50 MG/1
50 TABLET ORAL DAILY
Qty: 5 TABLET | Refills: 0 | Status: SHIPPED | OUTPATIENT
Start: 2021-11-01 | End: 2021-11-06

## 2021-11-01 RX ORDER — DEXTROMETHORPHAN POLISTIREX 30 MG/5ML
60 SUSPENSION ORAL
Qty: 100 ML | Refills: 0 | Status: SHIPPED | OUTPATIENT
Start: 2021-11-01 | End: 2021-11-11

## 2021-11-01 NOTE — ED TRIAGE NOTES
C/o sore throat, cough and nasal drainage that began prior to October 29, 2021. She states being fully vaccinated for Covid with Moderna vaccine.

## 2021-11-01 NOTE — ED PROVIDER NOTES
EMERGENCY DEPARTMENT HISTORY AND PHYSICAL EXAM    Date: (Not on file)  Patient Name: Valarie Johnston    History of Presenting Illness     Chief Complaint   Patient presents with    Sore Throat    Cough         History Provided By: Patient        Additional History (Context): Valarie Johnston is a 31 y.o. female with past medical history significant for elevated blood pressure, GERD, obesity who presents with complaints of sore throat, congestion, and productive cough worsening over the last 3 days. She denies chest pain or shortness of breath and no fever or chills. She is fully vaccinated against COVID-19 and denies any exposure to ill contacts. Patient voices concern over possible COVID-19 infection. Patient denies close contact to confirmed patient with coronavirus. Denies any history of international travel. 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 No immunocompromising conditions such as HIV, cancer, diabetes, transplant, alcoholism, kidney failure, autoimmune disease, taking immunosuppressive medications, or congenital disorder. Patient denies tobacco/vape use. She was initially seen on day 1 of illness when she only had a sore throat. She is concerned because the symptoms have now progressed to congestion and productive cough. PCP: None        Past History     Past Medical History:  Past Medical History:   Diagnosis Date    Anal fissure     Chest pain     Elevated blood pressure     GERD (gastroesophageal reflux disease)     Obesity     Rectal bleeding     Refraction disorder     Supposed to wear glasses but does not.      Tenesmus     Vertigo        Past Surgical History:  Past Surgical History:   Procedure Laterality Date    FLEXIBLE SIGMOIDOSCOPY N/A 3/12/2021    SIGMOIDOSCOPY FLEXIBLE with bxs performed by Nic Thurman MD at [facility] ENDOSCOPY       Family History:  Family History   Problem Relation Age of Onset    Cancer Neg Hx     Diabetes Neg Hx     Heart Disease Neg Hx     Hypertension Neg Hx     Stroke Neg Hx        Social History:  Social History     Tobacco Use    Smoking status: Never Smoker    Smokeless tobacco: Never Used   Substance Use Topics    Alcohol use: Yes     Comment: occasional    Drug use: No       Allergies:  No Known Allergies      Review of Systems     Review of Systems   Constitutional: Negative. Negative for chills and fever. HENT: Positive for congestion, ear pain, postnasal drip, rhinorrhea and sore throat. Eyes: Negative. Negative for pain and redness. Respiratory: Positive for cough. Negative for shortness of breath. Cardiovascular: Negative. Negative for chest pain, palpitations and leg swelling. Gastrointestinal: Negative. Negative for abdominal pain, constipation, diarrhea, nausea and vomiting. Genitourinary: Negative. Negative for dysuria, frequency, hematuria and urgency. Musculoskeletal: Negative. Negative for back pain, gait problem, joint swelling and neck pain. Skin: Negative. Negative for rash and wound. Neurological: Negative. Negative for dizziness, seizures, speech difficulty, weakness, light-headedness and headaches. Hematological: Negative for adenopathy. Does not bruise/bleed easily. All other systems reviewed and are negative. All Other Systems Negative  Physical Exam     Vitals:    11/01/21 1532   BP: (!) 151/106   Pulse: 78   Resp: 20   Temp: 98.7 °F (37.1 °C)   SpO2: 100%   Weight: 122.5 kg (270 lb)   Height: 5' 5\" (1.651 m)     Physical Exam  Vitals and nursing note reviewed. Constitutional:       General: She is not in acute distress. Appearance: Normal appearance. She is obese. She is not ill-appearing, toxic-appearing or diaphoretic. HENT:      Head: Normocephalic and atraumatic. Right Ear: Tympanic membrane, ear canal and external ear normal. There is no impacted cerumen. Left Ear: Tympanic membrane, ear canal and external ear normal. There is no impacted cerumen.       Nose: Congestion and rhinorrhea present. Mouth/Throat:      Mouth: Mucous membranes are moist.      Pharynx: Oropharynx is clear. Posterior oropharyngeal erythema present. No oropharyngeal exudate. Tonsils: No tonsillar exudate or tonsillar abscesses. 2+ on the right. 2+ on the left. Eyes:      General: Lids are normal. Vision grossly intact. No scleral icterus. Conjunctiva/sclera: Conjunctivae normal.      Pupils: Pupils are equal, round, and reactive to light. Cardiovascular:      Rate and Rhythm: Normal rate and regular rhythm. Pulses: Normal pulses. Heart sounds: Normal heart sounds. Pulmonary:      Effort: Pulmonary effort is normal. No respiratory distress. Breath sounds: Normal breath sounds. No stridor. No wheezing, rhonchi or rales. Chest:      Chest wall: No tenderness. Abdominal:      General: There is no distension. Palpations: Abdomen is soft. Tenderness: There is no abdominal tenderness. There is no right CVA tenderness, left CVA tenderness or guarding. Musculoskeletal:         General: Normal range of motion. Cervical back: Full passive range of motion without pain, normal range of motion and neck supple. No tenderness. Lymphadenopathy:      Cervical: No cervical adenopathy. Skin:     General: Skin is warm and dry. Capillary Refill: Capillary refill takes less than 2 seconds. Neurological:      General: No focal deficit present. Mental Status: She is alert and oriented to person, place, and time. Psychiatric:         Mood and Affect: Mood normal.         Behavior: Behavior normal. Behavior is cooperative. Diagnostic Study Results     Labs -   No results found for this or any previous visit (from the past 12 hour(s)).     Radiologic Studies -   No orders to display     CT Results  (Last 48 hours)    None        CXR Results  (Last 48 hours)    None            Medical Decision Making   I am the first provider for this patient. I reviewed the vital signs, available nursing notes, past medical history, past surgical history, family history and social history. Vital Signs-Reviewed the patient's vital signs. Pulse Oximetry Analysis - 100% on room air-normal    Records Reviewed: Nursing notes, old medical records and any previous labs, imaging, visits, consultations pertinent to patient care    Procedures:  Procedures    PPE worn during exam: Gloves, eye protection, and surgical mask    ED Course: Progress Notes, Reevaluation, and Consults:  3:30 PM  Initial assessment performed. The patients presenting problems have been discussed, and they/their family are in agreement with the care plan formulated and outlined with them. I have encouraged them to ask questions as they arise throughout their visit.      4:06 PM rapid strep is negative. Patient is handling secretions well and there is no concern for peritonsillar abscess or retropharyngeal abscess. Suspect COVID-19-pending swab. Discussed the possibility of worsening despite outpatient treatment plan. Patient understands this possibility and will follow-up immediately with worsening symptoms. Follow-up w/ PCP and supportive treatment. Recommended taking Tylenol as needed for fever and body aches. Due to coronavirus outbreak will recommend a 10-day self quarantine. Patient is in agreement to the quarantine measures. Will be provided a work note as necessary. Reviewed with her that COVID-19 pandemic is an evolving situation with rapidly changing recommendations & guidelines. Medical decisions are made based on the the best information available at the time. Recommended she stay tuned for updates published by trusted sources and to advise your PCP of any unexpected changes in clinical condition.     Provider Notes (Medical Decision Making):     Differential diagnosis: COVID-19, seasonal allergies/allergic rhinitis, URI, strep/viral pharyngitis, pneumonia, reactive airway/asthma exacerbation,    24-year-old female patient here with coughing, nasal congestion, sore throat, and rhinorrhea. Vital signs are stable and patient is afebrile. Patient does have tonsillar hypertrophy and erythema bilaterally but otherwise exam unremarkable. No retraction,stridor, or wheezing. Most consistent w/ viral infection, URI, COVID-19. There is no airway compromise, handling secretions well, no trismus, uvula midline with no swelling. Soft palate symmetric. No stridor. Respiratory rate is less than 18 and patient's oxygen saturation on room air has been greater than 96% throughout the ER stay. Speaking in full, clear sentences without staccato speech. No further evidence of septic shock, therefore fluids were withheld. No history of cigarette/smoking, age is not greater than 56years old, no history of COPD/underlying lung disease. No history of diabetes, cardiovascular disease, CKD, liver disease, immunosuppressed (transplant, chemo in the last 3 months, prednisone use, TNF blockers). MED RECONCILIATION:  No current facility-administered medications for this encounter. No current outpatient medications on file. Disposition:  Discharge home in stable condition with strict 10-day self quarantine instructions    DISCHARGE NOTE:     Patient has been reexamined. Patient has no new complaints, changes, or physical findings. Vital signs are stable. Care plan outlined and precautions discussed. Results of work up, plan of care and treatment plan, expectations, etc were reviewed with the patient. All medications were reviewed with the patient; will d/c home with outpatient f/u. All of pt's questions and concerns were addressed. Patient was instructed and agrees to follow up with pcp/specialists if indicated, as well as to return to the ED upon further deterioration. Patient is ready to go home.     Follow-up Information    None         There are no discharge medications for this patient. Diagnosis     Clinical Impression: No diagnosis found. Dictation disclaimer:  Please note that this dictation was completed with [employer], the computer voice recognition software. Quite often unanticipated grammatical, syntax, homophones, and other interpretive errors are inadvertently transcribed by the computer software. Please disregard these errors. Please excuse any errors that have escaped final proofreading.

## 2021-11-01 NOTE — Clinical Note
[address] [place] [address] [facility] 
[address] [facility] [id number] 
[phone] Work/School Note Date: 11/1/2021 To Whom It May concern: 
 
Jeremy Boucher was evaulated by the following provider(s): 
Attending Provider: Santiago Gregory MD 
Nurse Practitioner: FAHEEM Villareal. COVID19 virus is suspected. Per the CDC guidelines we recommend home isolation until the following conditions are all met: 1. At least 10 days have passed since symptoms first appeared and 2. At least 24 hours have passed since last fever without the use of fever-reducing medications and 
3. Symptoms (e.g., cough, shortness of breath) have improved Sincerely, 
 
 
 
 
FAHEEM Sales

## 2021-11-02 ENCOUNTER — PATIENT OUTREACH (OUTPATIENT)
Dept: CASE MANAGEMENT | Age: 36
End: 2021-11-02

## 2021-11-02 NOTE — PROGRESS NOTES
Patient contacted regarding COVID-19 suspected and ED discharge follow up. Discussed COVID-19 related testing which was pending  at this time. Test results were pending. Patient informed of results, if available? N/A . Care Transition Nurse contacted the patient by telephone to perform post discharge assessment. Call within 2 business days of discharge: Yes Verified name and  with patient as identifiers. Provided introduction to self, and explanation of the CTN/ACM role, and reason for call due to risk factors for infection and/or exposure to COVID-19. Symptoms reviewed with patient who verbalized the following symptoms: no worsening symptoms. Patient reports that she is feeling much better. Due to no new or worsening symptoms encounter was not routed to provider for escalation. Discussed follow-up appointments. If no appointment was previously scheduled, appointment scheduling offered:  no. [facility] follow up appointment(s): No future appointments. [facility] follow up appointment(s):   None noted at this time. Interventions to address risk factors: Obtained and reviewed discharge summary and/or continuity of care documents - after visit summary   Patient referred to the following resources as needed:   - PCP  - ED   - Health Department, state or local   - cdc.gov      Advance Care Planning:   Does patient have an Advance Directive: not on file per review at this time. CTN reviewed discharge instructions, medical action plan and red flag symptoms with the patient who verbalized understanding. Reviewed COVID vaccination status: yes. Per chart review, patient has received Covid vaccines. Education provided on COVID-19 vaccination as appropriate. Discussed exposure protocols and quarantine with CDC Guidelines.  Patient was given an opportunity to verbalize any questions and concerns and agrees to contact CTN or health care provider for questions related to their healthcare. Reviewedwith  patient  any new and changed medications related to discharge diagnosis     Was patient discharged with a pulse oximeter? Not noted at this time. CTN provided contact information. Plan for follow-up call in 5-7 days/as needed  based on severity of symptoms and risk factors.

## 2021-11-03 LAB
BACTERIA SPEC CULT: NORMAL
SARS-COV-2, NAA: NOT DETECTED
SERVICE CMNT-IMP: NORMAL

## 2021-12-27 ENCOUNTER — HOSPITAL ENCOUNTER (EMERGENCY)
Age: 36
Discharge: STILL ADMITTED OR ADMINISTRATIVE | End: 2021-12-27

## 2022-01-26 ENCOUNTER — OFFICE VISIT (OUTPATIENT)
Dept: SURGERY | Age: 37
End: 2022-01-26
Payer: COMMERCIAL

## 2022-01-26 VITALS
HEIGHT: 65 IN | RESPIRATION RATE: 18 BRPM | OXYGEN SATURATION: 100 % | TEMPERATURE: 97.9 F | BODY MASS INDEX: 46.23 KG/M2 | WEIGHT: 277.5 LBS | SYSTOLIC BLOOD PRESSURE: 140 MMHG | HEART RATE: 80 BPM | DIASTOLIC BLOOD PRESSURE: 66 MMHG

## 2022-01-26 DIAGNOSIS — R03.0 ELEVATED BP WITHOUT DIAGNOSIS OF HYPERTENSION: ICD-10-CM

## 2022-01-26 DIAGNOSIS — E66.01 MORBID OBESITY (HCC): Primary | ICD-10-CM

## 2022-01-26 DIAGNOSIS — K21.9 GASTROESOPHAGEAL REFLUX DISEASE, UNSPECIFIED WHETHER ESOPHAGITIS PRESENT: ICD-10-CM

## 2022-01-26 DIAGNOSIS — E66.01 MORBID OBESITY WITH BMI OF 45.0-49.9, ADULT (HCC): ICD-10-CM

## 2022-01-26 PROCEDURE — 99205 OFFICE O/P NEW HI 60 MIN: CPT | Performed by: NURSE PRACTITIONER

## 2022-01-26 NOTE — PROGRESS NOTES
[facility]          Pt Name: Sheila Bliss  MRN: [id number]  [place] 1964  Date of evaluation: 11/21/2020  Treating Resident Physician: Anupam Childers DO  Supervising Physician: [address] [place] [address]       Chief Complaint   Patient presents with    Shortness of Breath    Leg Swelling     History obtained from the patient. HISTORY OF PRESENT ILLNESS        Sheila Bliss is a 49 y.o. male with past medical history of atrial fibrillation, diabetes, cirrhosis, COPD, and CHF who presents to the emergency department for evaluation of lower extremity edema and shortness of breath. Patient states this occurred on Wednesday after a road trip from [place].  He states that the leg swelling is bilaterally with little pain. His family doctor has recently adjusted his diuretics. He does not admit to any chest pain, fever, numbness, tingling. The patient has no other acute complaints at this time. REVIEW OF SYSTEMS   Review of Systems   Constitutional: Negative for activity change, chills and fever. HENT: Negative for ear discharge, ear pain, nosebleeds, postnasal drip, rhinorrhea, sinus pressure, sinus pain, sore throat and trouble swallowing. Eyes: Negative for pain, discharge and visual disturbance. Respiratory: Positive for shortness of breath. Negative for cough, chest tightness and wheezing. Cardiovascular: Positive for leg swelling. Negative for chest pain and palpitations. Gastrointestinal: Negative for abdominal pain, constipation, diarrhea and vomiting. Endocrine: Negative for cold intolerance and heat intolerance. Genitourinary: Negative for decreased urine volume, difficulty urinating, dysuria, flank pain and urgency. Musculoskeletal: Negative for arthralgias, back pain, neck pain and neck stiffness. Skin: Negative for color change and rash.    Neurological: Negative for dizziness, weakness, Ti Bunch presents today for   Chief Complaint   Patient presents with    New Patient       Is someone accompanying this pt? no    Is the patient using any DME equipment during OV? no    Coordination of Care:  1. Have you been to the ER, urgent care clinic since your last visit? Hospitalized since your last visit? no    2. Have you seen or consulted any other health care providers outside of the [address] [place] [address] since your last visit? Include any pap smears or colon screening.  no light-headedness, numbness and headaches. PAST MEDICAL AND SURGICAL HISTORY     Past Medical History:   Diagnosis Date    Advanced cirrhosis of liver ([facility]     Atrial fibrillation ([facility]     CHF (congestive heart failure) (HCC)     Chronic kidney disease     Diabetes mellitus ([facility]     Gallstones     GERD (gastroesophageal reflux disease)     Headache     Portal hypertension (HCC)      Past Surgical History:   Procedure Laterality Date    APPENDECTOMY      HERNIA REPAIR           MEDICATIONS   No current facility-administered medications for this encounter. Current Outpatient Medications:     ASPIRIN 81 PO, Take by mouth daily, Disp: , Rfl:     varenicline (CHANTIX) 1 MG tablet, Take 1 mg by mouth 2 times daily, Disp: , Rfl:     chlordiazePOXIDE (LIBRIUM) 25 MG capsule, Take 25 mg by mouth 3 times daily as needed for Anxiety. , Disp: , Rfl:     dilTIAZem (DILACOR XR) 240 MG extended release capsule, Take 240 mg by mouth daily, Disp: , Rfl:     apixaban (ELIQUIS) 5 MG TABS tablet, Take by mouth 2 times daily, Disp: , Rfl:     gabapentin (NEURONTIN) 300 MG capsule, Take 300 mg by mouth 3 times daily. , Disp: , Rfl:     lactulose (CEPHULAC) 10 g packet, Take 10 g by mouth 2 times daily as needed, Disp: , Rfl:     metFORMIN (GLUCOPHAGE) 500 MG tablet, Take 500 mg by mouth 2 times daily (with meals), Disp: , Rfl:     metoprolol tartrate (LOPRESSOR) 50 MG tablet, Take 50 mg by mouth 2 times daily, Disp: , Rfl:     pantoprazole (PROTONIX) 40 MG tablet, Take 40 mg by mouth 2 times daily, Disp: , Rfl:     spironolactone (ALDACTONE) 25 MG tablet, Take 25 mg by mouth daily, Disp: , Rfl:       SOCIAL HISTORY     Social History     Social History Narrative    Not on file     Social History     Tobacco Use    Smoking status: Former Smoker    Smokeless tobacco: Never Used   Substance Use Topics    Alcohol use: Not on file    Drug use: Not on file         ALLERGIES   No Known Allergies      FAMILY HISTORY   History reviewed. No pertinent family history. PREVIOUS RECORDS   Previous records reviewed: This is this patient's first visit to [facility] ED, no previous records available on EMR. .        PHYSICAL EXAM     ED Triage Vitals [11/21/20 1229]   BP Temp Temp Source Pulse Resp SpO2 Height Weight   106/69 98.7 °F (37.1 °C) Oral 103 24 93 % 6' 2\" (1.88 m) (!) 320 lb (145.2 kg)     Initial vital signs and nursing assessment reviewed and Normal except for tachycardia and tachypnea. Borderline oxygen saturation. Unsure of patient's baseline. Additional Vital Signs:  Vitals:    11/21/20 1415   BP:    Pulse: 93   Resp: 22   Temp:    SpO2: 96%       Physical Exam  Constitutional:       General: He is not in acute distress. Appearance: Normal appearance. He is obese. He is ill-appearing (Chronic). He is not diaphoretic. HENT:      Head: Normocephalic and atraumatic. Mouth/Throat:      Mouth: Mucous membranes are moist.      Pharynx: Oropharynx is clear. No oropharyngeal exudate or posterior oropharyngeal erythema. Eyes:      Extraocular Movements: Extraocular movements intact. Conjunctiva/sclera: Conjunctivae normal.      Pupils: Pupils are equal, round, and reactive to light. Neck:      Musculoskeletal: Normal range of motion. No neck rigidity or muscular tenderness. Cardiovascular:      Rate and Rhythm: Regular rhythm. Tachycardia present. Pulses: Normal pulses. Heart sounds: Normal heart sounds. No murmur. No friction rub. No gallop. Pulmonary:      Effort: Pulmonary effort is normal. Tachypnea present. No accessory muscle usage or respiratory distress. Breath sounds: Examination of the right-upper field reveals decreased breath sounds. Examination of the right-middle field reveals decreased breath sounds. Examination of the right-lower field reveals decreased breath sounds. Decreased breath sounds present. No wheezing, rhonchi or rales. Abdominal:      Palpations: Abdomen is soft. Tenderness: There is no abdominal tenderness. There is no guarding or rebound. Musculoskeletal: Normal range of motion. General: No swelling. Right lower leg: Edema present. Left lower leg: Edema present. Skin:     General: Skin is warm and dry. Capillary Refill: Capillary refill takes less than 2 seconds. Findings: No bruising, erythema or lesion. Neurological:      General: No focal deficit present. Mental Status: He is alert and oriented to person, place, and time. Cranial Nerves: No cranial nerve deficit. Sensory: No sensory deficit. Motor: No weakness. Chest Tube    Date/Time: 11/21/2020 4:45 PM  Performed by: Tiffanie Karimi DO  Authorized by: Michael Schrader DO     Consent:     Consent obtained:  Verbal    Consent given by:  Patient    Risks discussed:  Bleeding, pain, infection and damage to surrounding structures    Alternatives discussed:  No treatment  Pre-procedure details:     Skin preparation:  ChloraPrep    Preparation: Patient was prepped and draped in the usual sterile fashion    Anesthesia (see MAR for exact dosages): Anesthesia method:  Local infiltration    Local anesthetic:  Lidocaine 1% w/o epi  Procedure details:     Placement location:  R lateral    Tube size (Czech): 14 Fr. Ultrasound guidance: yes      Tension pneumothorax: no      Tube connected to:  Suction    Drainage characteristics:  Yellow    Dressing:  Petrolatum-impregnated gauze and 4x4 sterile gauze  Post-procedure details:     Post-insertion x-ray findings: tube in good position      Patient tolerance of procedure: Tolerated well, no immediate complications        MEDICAL DECISION MAKING   Initial Assessment: Chronically ill-appearing 53-year-old male sitting up in bed in no respiratory distress.   Bilateral leg swelling and shortness of breath reported after road trip from [place].  Multiple comorbidities. Differential diagnosis includes but is not limited to cirrhotic edema, electrolyte abnormality, COPD exacerbation, CHF exacerbation, pulmonary embolism, ACS. Plan: Work-up to include EKG, chest x-ray, troponin, CBC, BNP, CMP, D-dimer. Creatinine 2.1, magnesium 1.4, D-dimer 4341. Covid negative. Chest x-ray with a large right-sided pleural effusion. Bedside pigtail chest tube with fluid analysis. No complications with chest tube, hospitalist team contacted. Pleural fluid analysis sent for smear, culture, cytology, LDH, glucose, protein. Discussed exudative versus transudative effusion and the causes with the patient. Patient thankful for procedure, work-up and agreeable to admission for further evaluation. Plan for CT for further evaluation.       ED RESULTS   Laboratory results:  Labs Reviewed   CBC WITH AUTO DIFFERENTIAL - Abnormal; Notable for the following components:       Result Value    RBC 3.78 (*)     Hemoglobin 12.6 (*)     Hematocrit 37.2 (*)     MCV 98.4 (*)     MCH 33.3 (*)     RDW-CV 16.1 (*)     RDW-SD 57.9 (*)     Platelets 468 (*)     Lymphocytes Absolute 0.8 (*)     All other components within normal limits   COMPREHENSIVE METABOLIC PANEL W/ REFLEX TO MG FOR LOW K - Abnormal; Notable for the following components:    Glucose 125 (*)     CREATININE 2.1 (*)     BUN 25 (*)     Sodium 133 (*)     Chloride 93 (*)     CO2 22 (*)     AST 57 (*)     Alkaline Phosphatase 143 (*)     Alb 3.3 (*)     Total Bilirubin 5.9 (*)     All other components within normal limits   D-DIMER, QUANTITATIVE - Abnormal; Notable for the following components:    D-Dimer, Quant 4341.00 (*)     All other components within normal limits   ANION GAP - Abnormal; Notable for the following components:    Anion Gap 18.0 (*)     All other components within normal limits   GLOMERULAR FILTRATION RATE, ESTIMATED - Abnormal; Notable for the following components:    Est, Glom Filt Rate 33 (*)     All other components within normal limits   MAGNESIUM - Abnormal; Notable for the following components:    Magnesium 1.4 (*)     All other components within normal limits   OSMOLALITY - Abnormal; Notable for the following components:    Osmolality Calc 272.3 (*)     All other components within normal limits   TROPONIN   BRAIN NATRIURETIC PEPTIDE   COVID-19   PROTIME-INR       Radiologic studies results:  XR CHEST PORTABLE   Final Result   Near complete opacification of the right hemithorax. This may be due to a large pleural effusion. Pneumonia may also be present. **This report has been created using voice recognition software. It may contain minor errors which are inherent in voice recognition technology. **      Final report electronically signed by Dr Master Tai on 11/21/2020 1:50 PM          ED Medications administered this visit: Medications - No data to display      ED COURSE     ED Course as of Nov 21 1644   Sat Nov 21, 2020   1451 Chest x-ray with large right pleural effusion. Discussed risks benefits and alternatives with the patient. He agreed to go forward with the placement of the drain.    [EM]   1525 Seen by hospitalist dr Dolores Lnae who agrees, asked for CT chest noncon be done and he will follow up on results    [AB]   21  Patient consented for procedure by the resident. I was present for the entire procedure which was done without any complications. Post procedure chest x-ray shows pigtail catheter in the right chest.  Obtained approximately a liter of straw-colored fluid and sent for analysis. Case discussed again with Dr. America Mosher, the hospitalist who agrees with everything so far    [AB]      ED Course User Index  [AB] Elissa Ling DO  [EM] Prasad Amador DO         Strict return precautions and follow up instructions were discussed with the patient prior to discharge, with which the patient agrees.         FINAL DISPOSITION     Final diagnoses:   Pleural effusion     Condition: stable  Dispo: Admit to telemetry      This transcription was electronically signed. Parts of this transcriptions may have been dictated by use of voice recognition software and electronically transcribed, and parts may have been transcribed with the assistance of an ED scribe. The transcription may contain errors not detected in proofreading. Please refer to my supervising physician's documentation if my documentation differs.     Electronically Signed: Bethany Del Cid, 11/21/20, 2:47 PM       Bethany Del Cid DO  Resident  11/21/20 4571

## 2022-01-26 NOTE — PATIENT INSTRUCTIONS
Google \"Dr Yordan Clarke" and 'webinar\"    New patient Instructions      1. Ensure all pre-operative insurances requirements are complete (ie; dietary visits, psychology consults, primary care documentation, etc)    2. Adhere to pre-operative weight loss / weight maintenance plan discussed in the office today. 3. Contact the office with any questions on pre-operative clearance issues (ie; cardiology work-up, pulmonary work-up, upper GI study, etc). 4. If a barium upper GI study has been ordered for your evaluation, make sure you are on liquids only the morning of the procedure.

## 2022-01-26 NOTE — PROGRESS NOTES
Bariatric Surgery Consultation    Subjective: The patient is a 31 y.o. obese female with a Body mass index is 46.18 kg/m². .  The patient is at her heaviest weight for the past several years. she has been overweight since childhood. she has been considering surgery since past 2 years. she desires surgery at this time because of multiple health concerns and their lifestyle issues which are hindered by their weight. she has been referred by her family provider for evaluation and treatment of their obesity via surgical intervention. Henry Samuels has tried multiple diets in her lifetime most recently tried behavior modification and unsupervised diets    Bariatric comorbidities present are   Patient Active Problem List   Diagnosis Code    Morbid obesity ([facility] E66.01    GERD (gastroesophageal reflux disease) K21.9    Morbid obesity with BMI of 45.0-49.9, adult ([facility] E66.01, Z68.42    Elevated BP without diagnosis of hypertension R03.0    Chronic upper back pain M54.9, G89.29       The patient is considering laparoscopic adjustable gastric band surgery and laparoscopic sleeve gastrectomy for surgical weight loss due to their ineffective progress with medical forms of weight loss and the urging of their physician who cares for their primary medical issues. The patient  now presents  for consideration for weight loss surgery understanding the benefits of this over a medical approach of weight loss as was discussed in our presentation on weight loss surgery. They have discussed their plans both with their family and primary care physician who is in support of their pursuit of such. The patient has not had health issues as of late and denies and gastrointestinal disturbances other than what is outlined below in their review of symptoms.  All of their prior evaluations available by both their PCP's and specialists physicians have been reviewed today either in the Care Everywhere portal or scanned under the media tab. I have spent a large portion of my initial consultation today reviewing the patients current dietary habits which have contributed to their health issues and obesity. I have suggested to them personally a dietary regimen that they can initiate now to help with their status as it pertains to their weight. They understand that the most important aspect of their journey through their weight loss endeavor will be their adherence to a new lifestyle of healthy eating behavior. They also understand that an adherence to an exercise program will not only help with weight loss but is ultimately important in weight maintenance. The patients goal weight is 168 lb. These goals are consistent with expected outcomes of their desired operation. her Medical goals are resolution of these health issues. Patient Active Problem List    Diagnosis Date Noted    Morbid obesity ([facility] [address]     GERD (gastroesophageal reflux disease)     Morbid obesity with BMI of 45.0-49.9, adult ([facility]     Elevated BP without diagnosis of hypertension     Chronic upper back pain      Past Surgical History:   Procedure Laterality Date    FLEXIBLE SIGMOIDOSCOPY N/A 3/12/2021    SIGMOIDOSCOPY FLEXIBLE with bxs performed by Dano Phillips MD at [facility] ENDOSCOPY    HX HEMORRHOIDECTOMY        Social History     Tobacco Use    Smoking status: Never Smoker    Smokeless tobacco: Never Used   Substance Use Topics    Alcohol use: Yes     Comment: occasional      Family History   Problem Relation Age of Onset    Hypertension Mother     Diabetes Brother     Cancer Neg Hx     Heart Disease Neg Hx     Stroke Neg Hx       Current Outpatient Medications   Medication Sig Dispense Refill    multivitamin, tx-iron-ca-min (THERA-M w/ IRON) 9 mg iron-400 mcg tab tablet Take 1 Tablet by mouth daily.        No Known Allergies       Review of Systems:       General - No history or complaints of unexpected fever, chills, or weight loss  Head/Neck - No history or complaints of headache, diplopia, dysphagia, hearing loss  Cardiac - No history or complaints of chest pain, palpitations, murmur, or shortness of breath  Pulmonary - No history or complaints of shortness of breath, productive cough, hemoptysis  Gastrointestinal - has reflux controlled with ginger ale or TUMS,no  abdominal pain, obstipation/constipation or blood per rectum  Genitourinary - No history or complaints of hematuria/dysuria, stress urinary incontinence symptoms, or renal lithiasis  Musculoskeletal - has joint pain in their upper back pain,  no muscular weakness  Hematologic - No history or complaints of bleeding disorders,  No blood transfusions  Neurologic - No history or complaints of  migraine headaches, seizure activity, syncopal episodes, TIA or stroke  Integumentary - No history or complaints of rashes, abnormal nevi, skin cancer  Gynecological - No abnormal bleeding or irregular menses               Objective:     Visit Vitals  BP (!) 140/66 (BP 1 Location: Right upper arm, BP Patient Position: Sitting)   Pulse 80   Temp 97.9 °F (36.6 °C) (Temporal)   Resp 18   Ht 5' 5\" (1.651 m)   Wt 125.9 kg (277 lb 8 oz)   SpO2 100%   BMI 46.18 kg/m²       Physical Examination: General appearance - alert, well appearing, and in no distress  Mental status - alert, oriented to person, place, and time  Neck - supple, no significant adenopathy  Lymphatics - no palpable lymphadenopathy, no hepatosplenomegaly  Chest - clear to auscultation, no wheezes, rales or rhonchi, symmetric air entry  Heart - normal rate, regular rhythm, normal S1, S2, no murmurs, rubs, clicks or gallops  Abdomen - soft, nontender, nondistended, no masses or organomegaly  Back exam - full range of motion, no tenderness, palpable spasm or pain on motion  Neurological - alert, oriented, normal speech, no focal findings or movement disorder noted  Musculoskeletal - no joint tenderness, deformity or swelling  Extremities - peripheral pulses normal, no pedal edema, no clubbing or cyanosis  Skin - normal coloration and turgor, no rashes, no suspicious skin lesions noted    Labs:       No results found for this or any previous visit (from the past 1440 hour(s)). Assessment:     Morbid obesity with comorbidity    Plan:     laparoscopic adjustable gastric band surgery and laparoscopic sleeve gastrectomy    This is a 31 y.o. female with a BMI of Body mass index is 46.18 kg/m². and the weight-related co-morbidties as noted below. Shen Sharma meets the NIH criteria for bariatric surgery based upon the BMI of Body mass index is 46.18 kg/m². and multiple weight-related co-morbidties. Shen Sharma has elected laparoscopic sleeve gastrectomy as her intervention of choice for treatment of morbid obestiy through surgical means secondary to its safety profile, rapid return to work  and decreases in operative risks over gastric bypass. In the office today, following Fabi's history and physical examination, a 30 minute discussion regarding the anatomic alterations for the laparoscopic sleeve gastrectomy was undertaken. The dietary expectations and the patient and physician dependent factors for success were thoroughly discussed, to include the need for interval follow-up and long-term dietary changes associated with success. The possible complications of the sleeve gastrectomy  were also discussed, to include;death, DVT/PE, staple line leak, bleeding, stricture formation, infection, nutritional deficiencies and sleeve dilation. Specific weight related outcomes for success were also discussed with an emphasis on careful and close follow-up with the first year and eating behavior modification as the baseline and cyclical hunger return. The patient expressed an understanding of the above factors, and her questions were answered in their entirety.     In addition, the patient watched a 1.5 hour power point seminar regarding obesity, surgical weight loss including, adjustable gastric band, gastric bypass, and sleeve gastrectomy. This discussion contrasted the different surgical techniques, mechanisms of actions and expected outcomes, and surgical and medical risks associated with each procedure. Today, the patient had all of her questions answered and desires to proceed with  bariatric surgery initially choosing sleeve gastrectomy as her surgical option. Patient initially more interested in discussing adjustable lap band, but after our conversation is receptive to sleeve gastrectomy as well. Will proceed with UGI to evaluate anatomy and degree of reflux and have her return in 2 months to discuss best plan of action with Dr Kraley De La Garza. Aware to follow-up with PCP for elevated BP reading. Secondary Diagnoses:     Dietary Intervention  - The patient is currently scheduled to see or has been followed by a bariatric nutritionist for an attempt at preoperative weight loss as has been dictated by their insurance carrier. They will be assessed at various times during their follow up to evaluate their progress depending on the length of time that is required once again by their carrier. I have explained the importance of preoperative weight loss and the benefits regarding lower surgical risk and also assisting the patient in reaching their weight loss goal.  Finally they understand there is a physiologic benefit from the standpoint of hepatic volume reduction and reduction of central visceral adiposity preoperatively. I have reiterated the importance of a low carbohydrate and high protein regimen to achieve their stated goal. I have reviewed their current eating behavior prior to this encounter and explained to them in an exhaustive fashion the appropriate diet that they should adhere to. They have been encouraged to loose weight pre operatively and understand it is our prerogative to cancel surgery or postpone their procedure in the event of significant weight gain. The patients weight loss goal pre operatively is 5-10 pounds. GERD -The patient understands that weight loss surgery is not a guaranteed cure for reflux disease but does understand the benefits that weight loss can have on reflux disease. They also understand that at the time of surgery the gastroesophageal junction will be evaluated for the presence of a diaphragmatic hernia. Hernias will be corrected always with the gastric band and sleeve gastrectomy procedures, but only on a case by case basis with the gastric bypass. The patient also understands that neither weight loss surgery nor repair of a diaphragmatic hernia repair guarantees the complete cessation of the disease.        Signed By: Orlando Carpio NP     January 26, 2022

## 2022-02-10 NOTE — LETTER
breathing is unlabored without accessory muscle use, normal breath sounds NOTIFICATION RETURN TO WORK / SCHOOL 
 
2/4/2020 10:04 PM 
 
Ms. Jose Enrique Lakhani [address] [facility],[address] [place] [address] To Whom It May Concern: 
 
Jose Enrique Lakhani is currently under the care of [address] [place] [address] EMERGENCY DEPT. She will return to work/school on: 2/7/20 If there are questions or concerns please have the patient contact our office.  
 
 
 
Sincerely, 
 
 
Reginaldo White MD

## 2022-02-23 ENCOUNTER — HOSPITAL ENCOUNTER (OUTPATIENT)
Age: 37
Setting detail: OUTPATIENT SURGERY
Discharge: HOME | End: 2022-02-23
Attending: SPECIALIST | Admitting: SPECIALIST
Payer: COMMERCIAL

## 2022-02-23 ENCOUNTER — APPOINTMENT (OUTPATIENT)
Dept: GENERAL RADIOLOGY | Age: 37
End: 2022-02-23
Attending: SPECIALIST
Payer: COMMERCIAL

## 2022-02-23 ENCOUNTER — APPOINTMENT (OUTPATIENT)
Dept: SURGERY | Age: 37
End: 2022-02-23

## 2022-02-23 VITALS
WEIGHT: 281 LBS | BODY MASS INDEX: 46.82 KG/M2 | HEART RATE: 80 BPM | DIASTOLIC BLOOD PRESSURE: 90 MMHG | SYSTOLIC BLOOD PRESSURE: 148 MMHG | HEIGHT: 65 IN | TEMPERATURE: 97.9 F | RESPIRATION RATE: 18 BRPM | OXYGEN SATURATION: 100 %

## 2022-02-23 DIAGNOSIS — E66.01 MORBID OBESITY (HCC): ICD-10-CM

## 2022-02-23 DIAGNOSIS — K21.9 GASTROESOPHAGEAL REFLUX DISEASE, UNSPECIFIED WHETHER ESOPHAGITIS PRESENT: ICD-10-CM

## 2022-02-23 PROCEDURE — 74240 X-RAY XM UPR GI TRC 1CNTRST: CPT | Performed by: SPECIALIST

## 2022-02-23 PROCEDURE — 74240 X-RAY XM UPR GI TRC 1CNTRST: CPT

## 2022-02-23 PROCEDURE — 74011000250 HC RX REV CODE- 250: Performed by: SPECIALIST

## 2022-02-23 PROCEDURE — 76040000019: Performed by: SPECIALIST

## 2022-02-23 NOTE — PROCEDURES
Esthela Quinn   : 1985  Medical Record LPTIBM:[id number]            PREPROCEDURE DIAGNOSIS: This patient is preoperative for laparoscopic sleeve gastrectomy procedure with a history of  reflux disease. POSTPROCEDURE DIAGNOSIS: This patient is preoperative for laparoscopic sleeve gastrectomy procedure with a history of  reflux disease. PROCEDURES PERFORMED: Upper GI study with barium. ESTIMATED BLOOD LOSS: None. SPECIMENS: None. STATEMENT OF MEDICAL NECESSITY: The patient is a patient with a  longstanding history of obesity. They are now considering the laparoscopic sleeve gastrectomy procedure as a means of surgical weight control and due to their history of reflux disease and are being assessed preoperatively for such. DESCRIPTION OF PROCEDURE: The patient was brought to the fluoroscopy unit and  was given thin barium. On swallowing of barium, they were noted to have  normal peristalsis of their esophagus. They had prompt filling of distal  esophagus with tapering into the gastroesophageal junction. There was no evidence of a hiatal hernia present. Contrast then filled the gastric cardia, fundus,body and pre pyloric region with no abnormalities noted. Contrast then exited the pylorus in normal fashion. No obstruction was noted. There was no evidence of reflux noted.     (normal anatomy)    Lia Hartmann MD

## 2022-03-02 ENCOUNTER — OFFICE VISIT (OUTPATIENT)
Dept: SURGERY | Age: 37
End: 2022-03-02

## 2022-03-02 VITALS — HEIGHT: 65 IN | WEIGHT: 279.6 LBS | BODY MASS INDEX: 46.58 KG/M2

## 2022-03-02 DIAGNOSIS — E66.01 MORBID OBESITY (HCC): Primary | ICD-10-CM

## 2022-03-04 NOTE — PROGRESS NOTES
[facility]  Multidisciplinary Weight Loss    Name: Olivia Hutton   : 1985    Session# 1  Date: 3/4/2022    Visit Vitals  Ht 5' 5\" (1.651 m)   Wt 126.8 kg (279 lb 9.6 oz)   BMI 46.53 kg/m²       Pt has GAINED 2.1 lbs since initial consult on 2022. Dietary Instructions    Pt attended 90 min in-person class. Topics reviewed: Behavior modification techniques, pre-op diet key principles (Including - Understanding low carbohydrates, low sugar, higher protein meals, no meal skipping, protein sources, carbohydrate sources, 64 ounces of non-caloric fluid, etc.). Encouraged pt follow a low carbohydrate, high protein, 1200 kcal diet with no liquids at meal table (for preparation of post-op 30:30 rule recommendation), high protein, 64+ oz no sugar, no caffeine, no carbonation fluids per day. Educated pt on the importance of eating 3 meals/day at regularly scheduled times including breakfast within 1st 1-2 hours of waking. Reviewed working toward 4-6 smaller meals per day, to assist with optimizing protein intake. Suggested patient uses a protein supplement as a meal replacement instead of skipping OR as a between meal high protein snack. Also educated on the importance of including a protein with every meal and snack and reviewed lean sources. Lastly introduced the Plate Method for Meal Planning and reviewed/discussed the importance of limiting and reducing daily carbohydrate intake to 75-100g/day now with an ultimate goal of 30-50g/day pre-op and a very low carbohydrate content post-op. Discussed label reading, tips for measuring carbohydrates, and low carbohydrate- high protein meal and snack ideas. Discussed perceived compliance through recall of knowledge in class.     Comments:  Recommended dietary changes discussed for both before and after surgery Recommendation to follow 1200 calorie diet, working to reduce total carbohydrate intake to  g or less per day and increasing protein intake to  g per day, with no liquids at meal table (for preparation of post-op 30:30 rule recommendation), and 64+ oz  no sugar, no caffeine, no carbonation fluids per day. Recommend pt adopt an exercise routine of at least 3-5 days per week for at least 30 minutes/day. If pt unable to participate in walking, bruce, swimming, or other exercises, recommend pt work with a physical therapist and/or participate in chair exercises, yoga, and/or increase activities of daily living as able. Discussed importance of sampling protein supplements, protein supplement label guidelines and popularly selected items. Handouts provided:    Bariatric Surgery Criteria Packet  [employer] of Vitamins& Minerals   Post-Op Education Packet   Meal Guide packet   BD Starches guide   Nuts for Nuts? Be Careful!    Protein chart   Protein Content of Foods   \"Carb Counting\"   \"Snack Suggestions\"    Behavior Modification    Identify obstacles to trigger change  Achieving/Rewarding goals met  Positive attitude  Comments: Reinforced importance of modifying lifestyle patterns and behaviors to promote weight loss and long-term weight maintenance. I talked to patient about the importance of taking vitamins post op and we reviewed the vitamins that patients will be taking post op. Encouraged pt to begin taking multivitamin and probiotic pre-op. Pt completed Bariatric-specific nutrition questionnaire. RD reviewed answers and provided feedback on responses that align with bariatric recommendations to behavior changes. Provided feedback on recommendations, areas for improvement, and provided positive feedback on areas where pt is making positive behavior changes. Pt questionnaire is included in chart separate from this note. All current stated pt questions answered. Goals:   1. Work to increase to 3-4 small meals per day, with planned snacks as needed.   Recommend following My Bariatric Plate method for meal planning - focusing on lean protein, non-starchy vegetables, and measured amounts of starch, or 50% protein / 50% non-starchy vegetables by surgery. - Goal of  g protein and  g carbohydrate per day. - Recommend continuing protein supplement as meal replacement at least 1x/day OR as high protein snack option  2. Increase non caloric fluid to 64 oz per day. Eliminate caffeine, added sugar, carbonation, and straws.               -Continue to work to decrease sugar sweetened beverages - goal of calorie free beverages only              -Must eliminate caffeine prior to surgery and avoid for ~6-8 weeks   -Practice 30:30 rule,  food and flood   3. Start activity regimen, work to increase ADL  4. Start Complete MVI and probiotic. Candidate for surgery (per RD): YES - Pt has met insurance requirements for pre-operatory nutrition education. Pt acknowledges understanding that bariatric surgery requires lifelong adherence to dietary and exercise behavior change recommendations in order to be successful with weight loss and weight-loss maintenance. Pt demonstrates understanding of post-op key diet principles and recommendations through class discussion and recall. Patient passed bariatric quiz with at least 80% pass rate. Pt does not have any questions/concerns at this time. RD contact information provided for future nutrition questions or concerns. [x] Pt requested and e-mail sent to pt with support group information for monthly Zoom class.     ALEX Robb, MS

## 2022-04-22 ENCOUNTER — HOSPITAL ENCOUNTER (OUTPATIENT)
Dept: LAB | Age: 37
Discharge: HOME | End: 2022-04-22
Payer: COMMERCIAL

## 2022-04-22 ENCOUNTER — OFFICE VISIT (OUTPATIENT)
Dept: INTERNAL MEDICINE CLINIC | Age: 37
End: 2022-04-22
Payer: COMMERCIAL

## 2022-04-22 ENCOUNTER — APPOINTMENT (OUTPATIENT)
Dept: INTERNAL MEDICINE CLINIC | Age: 37
End: 2022-04-22

## 2022-04-22 VITALS
TEMPERATURE: 97.1 F | BODY MASS INDEX: 47.28 KG/M2 | RESPIRATION RATE: 18 BRPM | DIASTOLIC BLOOD PRESSURE: 102 MMHG | OXYGEN SATURATION: 98 % | HEART RATE: 74 BPM | SYSTOLIC BLOOD PRESSURE: 140 MMHG | WEIGHT: 283.8 LBS | HEIGHT: 65 IN

## 2022-04-22 DIAGNOSIS — Z00.00 LABORATORY TESTS ORDERED AS PART OF A COMPLETE PHYSICAL EXAM (CPE): ICD-10-CM

## 2022-04-22 DIAGNOSIS — R03.0 ELEVATED BLOOD PRESSURE READING WITHOUT DIAGNOSIS OF HYPERTENSION: ICD-10-CM

## 2022-04-22 DIAGNOSIS — E55.9 VITAMIN D DEFICIENCY: ICD-10-CM

## 2022-04-22 DIAGNOSIS — Z76.89 ENCOUNTER TO ESTABLISH CARE: Primary | ICD-10-CM

## 2022-04-22 DIAGNOSIS — E66.01 CLASS 2 SEVERE OBESITY DUE TO EXCESS CALORIES WITH SERIOUS COMORBIDITY IN ADULT, UNSPECIFIED BMI (HCC): ICD-10-CM

## 2022-04-22 DIAGNOSIS — K21.9 GASTROESOPHAGEAL REFLUX DISEASE WITHOUT ESOPHAGITIS: ICD-10-CM

## 2022-04-22 DIAGNOSIS — E78.5 DYSLIPIDEMIA: ICD-10-CM

## 2022-04-22 LAB
25(OH)D3 SERPL-MCNC: 11.3 NG/ML (ref 30–100)
ALBUMIN SERPL-MCNC: 3.4 G/DL (ref 3.4–5)
ALBUMIN/GLOB SERPL: 0.8 {RATIO} (ref 0.8–1.7)
ALP SERPL-CCNC: 79 U/L (ref 45–117)
ALT SERPL-CCNC: 22 U/L (ref 13–56)
ANION GAP SERPL CALC-SCNC: 7 MMOL/L (ref 3–18)
AST SERPL-CCNC: 11 U/L (ref 10–38)
BASOPHILS # BLD: 0 K/UL (ref 0–0.1)
BASOPHILS NFR BLD: 0 % (ref 0–2)
BILIRUB SERPL-MCNC: 0.3 MG/DL (ref 0.2–1)
BUN SERPL-MCNC: 9 MG/DL (ref 7–18)
BUN/CREAT SERPL: 14 (ref 12–20)
CALCIUM SERPL-MCNC: 9.1 MG/DL (ref 8.5–10.1)
CHLORIDE SERPL-SCNC: 105 MMOL/L (ref 100–111)
CHOLEST SERPL-MCNC: 217 MG/DL
CO2 SERPL-SCNC: 27 MMOL/L (ref 21–32)
CREAT SERPL-MCNC: 0.66 MG/DL (ref 0.6–1.3)
DIFFERENTIAL METHOD BLD: NORMAL
EOSINOPHIL # BLD: 0 K/UL (ref 0–0.4)
EOSINOPHIL NFR BLD: 1 % (ref 0–5)
ERYTHROCYTE [DISTWIDTH] IN BLOOD BY AUTOMATED COUNT: 12.5 % (ref 11.6–14.5)
EST. AVERAGE GLUCOSE BLD GHB EST-MCNC: 111 MG/DL
GLOBULIN SER CALC-MCNC: 4.1 G/DL (ref 2–4)
GLUCOSE SERPL-MCNC: 94 MG/DL (ref 74–99)
HBA1C MFR BLD: 5.5 % (ref 4.2–5.6)
HCT VFR BLD AUTO: 38.4 % (ref 35–45)
HDLC SERPL-MCNC: 55 MG/DL (ref 40–60)
HDLC SERPL: 3.9 {RATIO} (ref 0–5)
HGB BLD-MCNC: 12.2 G/DL (ref 12–16)
IMM GRANULOCYTES # BLD AUTO: 0 K/UL (ref 0–0.04)
IMM GRANULOCYTES NFR BLD AUTO: 0 % (ref 0–0.5)
LDLC SERPL CALC-MCNC: 147.6 MG/DL (ref 0–100)
LIPID PROFILE,FLP: ABNORMAL
LYMPHOCYTES # BLD: 2.3 K/UL (ref 0.9–3.6)
LYMPHOCYTES NFR BLD: 44 % (ref 21–52)
MCH RBC QN AUTO: 27.2 PG (ref 24–34)
MCHC RBC AUTO-ENTMCNC: 31.8 G/DL (ref 31–37)
MCV RBC AUTO: 85.7 FL (ref 78–100)
MONOCYTES # BLD: 0.3 K/UL (ref 0.05–1.2)
MONOCYTES NFR BLD: 6 % (ref 3–10)
NEUTS SEG # BLD: 2.6 K/UL (ref 1.8–8)
NEUTS SEG NFR BLD: 49 % (ref 40–73)
NRBC # BLD: 0 K/UL (ref 0–0.01)
NRBC BLD-RTO: 0 PER 100 WBC
PLATELET # BLD AUTO: 382 K/UL (ref 135–420)
PMV BLD AUTO: 9.9 FL (ref 9.2–11.8)
POTASSIUM SERPL-SCNC: 4.2 MMOL/L (ref 3.5–5.5)
PROT SERPL-MCNC: 7.5 G/DL (ref 6.4–8.2)
RBC # BLD AUTO: 4.48 M/UL (ref 4.2–5.3)
SODIUM SERPL-SCNC: 139 MMOL/L (ref 136–145)
T4 FREE SERPL-MCNC: 1.2 NG/DL (ref 0.7–1.5)
TRIGL SERPL-MCNC: 72 MG/DL (ref ?–150)
TSH SERPL DL<=0.05 MIU/L-ACNC: 1.84 UIU/ML (ref 0.36–3.74)
VLDLC SERPL CALC-MCNC: 14.4 MG/DL
WBC # BLD AUTO: 5.3 K/UL (ref 4.6–13.2)

## 2022-04-22 PROCEDURE — 36415 COLL VENOUS BLD VENIPUNCTURE: CPT

## 2022-04-22 PROCEDURE — 82306 VITAMIN D 25 HYDROXY: CPT

## 2022-04-22 PROCEDURE — 99204 OFFICE O/P NEW MOD 45 MIN: CPT | Performed by: NURSE PRACTITIONER

## 2022-04-22 PROCEDURE — 83036 HEMOGLOBIN GLYCOSYLATED A1C: CPT

## 2022-04-22 PROCEDURE — 85025 COMPLETE CBC W/AUTO DIFF WBC: CPT

## 2022-04-22 PROCEDURE — 80061 LIPID PANEL: CPT

## 2022-04-22 PROCEDURE — 84443 ASSAY THYROID STIM HORMONE: CPT

## 2022-04-22 PROCEDURE — 80053 COMPREHEN METABOLIC PANEL: CPT

## 2022-04-22 PROCEDURE — 84439 ASSAY OF FREE THYROXINE: CPT

## 2022-04-22 NOTE — PROGRESS NOTES
Chief Complaint   Patient presents with   [facility]     1. \"Have you been to the ER, urgent care clinic since your last visit? Hospitalized since your last visit? \" n/a    2. \"Have you seen or consulted any other health care providers outside of the [address] [place] [address] since your last visit? \" n/a     3. For patients aged 39-70: Has the patient had a colonoscopy / FIT/ Cologuard? NA - based on age      If the patient is female:    4. For patients aged 41-77: Has the patient had a mammogram within the past 2 years? NA - based on age or sex      11. For patients aged 21-65: Has the patient had a pap smear?  Yes, within the last 2 years

## 2022-04-22 NOTE — PROGRESS NOTES
[facility] [address] [facility], [address]  [phone] [id number]/[phone] fax    4/22/2022    Michelle Collazo 1985 is a pleasant BLACK/ female. She is a single mother of 1 child. She works at [employer]; night shift. Past Medical History:   Diagnosis Date    Anal fissure     Class 2 severe obesity due to excess calories with serious comorbidity in adult [facility])     Elevated blood pressure reading without diagnosis of hypertension     GERD (gastroesophageal reflux disease)     avoids food triggers, ginger ale TUMS    Hemorrhoids     Refraction disorder     Supposed to wear glasses but does not. Past Surgical History:   Procedure Laterality Date    FLEXIBLE SIGMOIDOSCOPY N/A 3/12/2021    SIGMOIDOSCOPY FLEXIBLE with bxs performed by Angelika Navarrete MD at [address] [place] [address] HX HEMORRHOIDECTOMY       Current Outpatient Medications   Medication Sig    multivitamin, tx-iron-ca-min (THERA-M w/ IRON) 9 mg iron-400 mcg tab tablet Take 1 Tablet by mouth daily. (Patient not taking: Reported on 4/22/2022)     No current facility-administered medications for this visit. Allergies and Intolerances:   No Known Allergies  Family History:   Family History   Problem Relation Age of Onset    Hypertension Mother     Diabetes Brother     Cancer Neg Hx     Heart Disease Neg Hx     Stroke Neg Hx      Social History:   She  reports that she has never smoked.  She has never used smokeless tobacco.   Social History     Substance and Sexual Activity   Alcohol Use Yes    Comment: occasional     Immunization History:  Immunization History   Administered Date(s) Administered    COVID-19, Moderna Booster, PF, 0.25mL Dose 06/08/2021    COVID-19, Lila Asper, Primary or Immunocompromised Series, MRNA, PF, 100mcg/0.5mL 05/11/2021    Influenza Vaccine 10/01/2020    Influenza Vaccine PF 10/13/2013    TB Skin Test (PPD) Intradermal 04/25/2020    Tdap 10/13/2013       Todays concerns:  1. Establish care. 2. Obesity. Considering lap gastric sleeve. 3. GERD. Underwent UGI with negative results. Not taking medications. Tries to avoid irritants. When she has sx she \"suffers through it. \"  4. Unaware of elevated BP. Mother has HTN. Not seeking BP meds. Review of Systems:   As above included in HPI. Otherwise 11 point review of systems negative including constitutional, skin, HENT, eyes, respiratory, cardiovascular, gastrointestinal, genitourinary, musculoskeletal, endocrine, hematologic, allergy, and neurologic. Review of Data:  Obtain today      Physical:   Visit Vitals  BP (!) 140/102   Pulse 74   Temp 97.1 °F (36.2 °C) (Temporal)   Resp 18   Ht 5' 5\" (1.651 m)   Wt 283 lb 12.8 oz (128.7 kg)   LMP 04/21/2022 (Approximate)   SpO2 98%   BMI 47.23 kg/m²      Wt Readings from Last 3 Encounters:   04/22/22 283 lb 12.8 oz (128.7 kg)   03/02/22 279 lb 9.6 oz (126.8 kg)   02/23/22 281 lb (127.5 kg)       Exam:   Physical Exam  Constitutional:       Appearance: Normal appearance. She is obese. Comments: Morbidly   HENT:      Head: Normocephalic and atraumatic. Right Ear: Tympanic membrane, ear canal and external ear normal.      Left Ear: Tympanic membrane, ear canal and external ear normal.   Eyes:      Extraocular Movements: Extraocular movements intact. Conjunctiva/sclera: Conjunctivae normal.   Neck:      Vascular: No carotid bruit. Cardiovascular:      Rate and Rhythm: Normal rate and regular rhythm. Pulses: Normal pulses. Heart sounds: Normal heart sounds. Comments: No edema noted to mary LEs  Pulmonary:      Effort: Pulmonary effort is normal. No respiratory distress. Breath sounds: Normal breath sounds. No wheezing. Abdominal:      General: Abdomen is flat. Bowel sounds are normal. There is no distension. Palpations: Abdomen is soft. Tenderness: There is no abdominal tenderness.    Genitourinary: Comments: Deferred   Musculoskeletal:         General: Normal range of motion. Cervical back: Normal range of motion and neck supple. Skin:     General: Skin is warm and dry. Neurological:      General: No focal deficit present. Mental Status: She is alert and oriented to person, place, and time. Psychiatric:         Mood and Affect: Mood normal.         Behavior: Behavior normal.        Body mass index is 47.23 kg/m². Plan:      ICD-10-CM ICD-9-CM    1. Encounter to establish care  Z76.89 V65.8    2. Elevated blood pressure reading without diagnosis of hypertension  R03.0 796.2    3. Gastroesophageal reflux disease without esophagitis  K21.9 530.81    4. Class 2 severe obesity due to excess calories with serious comorbidity in adult, unspecified BMI ([facility])  E66.01 278.01    5. Laboratory tests ordered as part of a complete physical exam (CPE)  Z00.00 V72.62 CBC WITH AUTOMATED DIFF      METABOLIC PANEL, COMPREHENSIVE      HEMOGLOBIN A1C WITH EAG      LIPID PANEL      TSH 3RD GENERATION      T4, FREE      VITAMIN D, 25 HYDROXY       -Encounter to establish care    -Elevated blood pressure w/o dx of HTN  Discussed in great detail with patient the severity of her current BP readings  Discussed possible CV events that can occur-CVA, MI  Pt declines BP treatment at this time  Limit sodium in diet to 2g/d  Avoid pork  Initiate cardio exercise  Weight reduction  Increase water intake  Check BP and report readings greater than 139/89 to office    -GERD  Discussed the negative effects of uncontrolled reflux such as ulcer, Barretts disease. Recommend OTC PPI prn.   Avoid gut irritants such as spicy foods  Avoid laying down for 30 to 45 minutes after eating  Avoid excessive alcohol consumption    -Class 2 severe obesity  BMI is out of normal parameters and plan is as follows: Discussed in great detail on diet, portion control, exercise, avoiding foods high in sugar, carbs and starches, fatty/greasy foods and to eat until satisfied not til full. I have counseled this patient on diet and exercise regimens as well. Follow up with bariatric surgeon as scheduled      Reviewed medication and completed medication reconciliation with the patient. Reviewed side effects of medications with the patient. Questions were answered and patient verb understanding.       Follow up 3 months      Dr. Gabriel Pettit, AGNP-C, DNP  [facility] [address] [place] [address]       (Apr Phy)

## 2022-04-25 PROBLEM — E55.9 VITAMIN D DEFICIENCY: Status: ACTIVE | Noted: 2022-04-25

## 2022-04-25 PROBLEM — E78.5 DYSLIPIDEMIA: Status: ACTIVE | Noted: 2022-04-25

## 2022-04-25 RX ORDER — ERGOCALCIFEROL 1.25 MG/1
50000 CAPSULE ORAL
Qty: 13 CAPSULE | Refills: 3 | Status: SHIPPED | OUTPATIENT
Start: 2022-04-25

## 2022-04-25 NOTE — PROGRESS NOTES
Please inform pt of the following;  1. Kidney/liver ok  2. Dyslipidemia. Level 147. Goal in <100. Reduce fried fatty foods in diet. Lose weight. Will monitor in 1 year. 3. TSH normal  4. Vit D def. Level 11.3. Escribed Vit D 56276. Take 1 weekly. Will monitor 1 year  5. No Diabetes. A1c 5.5  6.  No anemia  Thank you

## 2022-04-26 ENCOUNTER — TELEPHONE (OUTPATIENT)
Dept: INTERNAL MEDICINE CLINIC | Age: 37
End: 2022-04-26

## 2022-04-26 NOTE — TELEPHONE ENCOUNTER
----- Message from Jay Kurtz DNP sent at 4/25/2022  7:39 PM EDT -----  Please inform pt of the following;  1. Kidney/liver ok  2. Dyslipidemia. Level 147. Goal in <100. Reduce fried fatty foods in diet. Lose weight. Will monitor in 1 year. 3. TSH normal  4. Vit D def. Level 11.3. Escribed Vit D 62845. Take 1 weekly. Will monitor 1 year  5. No Diabetes. A1c 5.5  6.  No anemia  Thank you

## 2022-04-26 NOTE — TELEPHONE ENCOUNTER
Patient reached and made aware of lab results per Dr. Glenis Becker. Patient verbalized understanding.

## 2022-04-28 ENCOUNTER — TELEPHONE (OUTPATIENT)
Dept: INTERNAL MEDICINE CLINIC | Age: 37
End: 2022-04-28

## 2022-04-28 NOTE — TELEPHONE ENCOUNTER
Pt calling asking to speak to nurse, says she just left urgent care and was told the sore on her mouth could be a kind of herpes. Wants to know if she should be tested? Says it is freaking her out.

## 2022-04-29 NOTE — TELEPHONE ENCOUNTER
Is this on her lip? Herpes on the lip usually means cold sore. Unable to dx without assessing pt. If they told her it was herpes, did they specify cold sore? Did she ask for treatment?

## 2022-05-17 ENCOUNTER — HOSPITAL ENCOUNTER (EMERGENCY)
Age: 37
Discharge: HOME | End: 2022-05-17
Attending: STUDENT IN AN ORGANIZED HEALTH CARE EDUCATION/TRAINING PROGRAM
Payer: COMMERCIAL

## 2022-05-17 VITALS
HEART RATE: 97 BPM | WEIGHT: 285 LBS | BODY MASS INDEX: 47.48 KG/M2 | HEIGHT: 65 IN | SYSTOLIC BLOOD PRESSURE: 127 MMHG | RESPIRATION RATE: 18 BRPM | DIASTOLIC BLOOD PRESSURE: 89 MMHG | TEMPERATURE: 98 F

## 2022-05-17 DIAGNOSIS — B96.89 BV (BACTERIAL VAGINOSIS): Primary | ICD-10-CM

## 2022-05-17 DIAGNOSIS — R30.0 DYSURIA: ICD-10-CM

## 2022-05-17 DIAGNOSIS — N76.0 BV (BACTERIAL VAGINOSIS): Primary | ICD-10-CM

## 2022-05-17 LAB
APPEARANCE UR: CLEAR
BACTERIA URNS QL MICRO: ABNORMAL /HPF
BILIRUB UR QL: NEGATIVE
COLOR UR: YELLOW
EPITH CASTS URNS QL MICRO: ABNORMAL /LPF (ref 0–5)
GLUCOSE UR STRIP.AUTO-MCNC: NEGATIVE MG/DL
HCG UR QL: NEGATIVE
HGB UR QL STRIP: NEGATIVE
KETONES UR QL STRIP.AUTO: NEGATIVE MG/DL
LEUKOCYTE ESTERASE UR QL STRIP.AUTO: NEGATIVE
MUCOUS THREADS URNS QL MICRO: ABNORMAL /LPF
NITRITE UR QL STRIP.AUTO: NEGATIVE
PH UR STRIP: 5 [PH] (ref 5–8)
PROT UR STRIP-MCNC: ABNORMAL MG/DL
RBC #/AREA URNS HPF: NEGATIVE /HPF (ref 0–5)
SERVICE CMNT-IMP: NORMAL
SP GR UR REFRACTOMETRY: >1.03 (ref 1–1.03)
UROBILINOGEN UR QL STRIP.AUTO: 1 EU/DL (ref 0.2–1)
WBC URNS QL MICRO: ABNORMAL /HPF (ref 0–4)
WET PREP GENITAL: NORMAL
WET PREP GENITAL: NORMAL

## 2022-05-17 PROCEDURE — 99283 EMERGENCY DEPT VISIT LOW MDM: CPT

## 2022-05-17 PROCEDURE — 87210 SMEAR WET MOUNT SALINE/INK: CPT

## 2022-05-17 PROCEDURE — 74011250637 HC RX REV CODE- 250/637: Performed by: STUDENT IN AN ORGANIZED HEALTH CARE EDUCATION/TRAINING PROGRAM

## 2022-05-17 PROCEDURE — 81025 URINE PREGNANCY TEST: CPT

## 2022-05-17 PROCEDURE — 81001 URINALYSIS AUTO W/SCOPE: CPT

## 2022-05-17 RX ORDER — METRONIDAZOLE 500 MG/1
500 TABLET ORAL
Status: COMPLETED | OUTPATIENT
Start: 2022-05-17 | End: 2022-05-17

## 2022-05-17 RX ORDER — METRONIDAZOLE 500 MG/1
500 TABLET ORAL 2 TIMES DAILY
Qty: 14 TABLET | Refills: 0 | Status: SHIPPED | OUTPATIENT
Start: 2022-05-17 | End: 2022-05-24

## 2022-05-17 RX ADMIN — METRONIDAZOLE 500 MG: 500 TABLET ORAL at 05:06

## 2022-05-17 NOTE — ED NOTES
I have reviewed discharge instructions with the patient. The patient verbalized understanding. Patient armband removed and given to patient to take home. Patient was informed of the privacy risks if armband lost or stolen  Current Discharge Medication List      START taking these medications    Details   metroNIDAZOLE (FlagyL) 500 mg tablet Take 1 Tablet by mouth two (2) times a day for 7 days.   Qty: 14 Tablet, Refills: 0  Start date: 5/17/2022, End date: 5/24/2022

## 2022-05-17 NOTE — ED TRIAGE NOTES
Pt c/o urinary pain with some sticky discharges started yesterday details… detailed exam normal strength/ROM intact

## 2022-05-17 NOTE — ED PROVIDER NOTES
EMERGENCY DEPARTMENT HISTORY AND PHYSICAL EXAM      Date: (Not on file)  Patient Name: Kiana León    History of Presenting Illness     Chief Complaint   Patient presents with    Urinary Pain       History (Context): Kiana León is a 31 y.o. female with a past medical history significant for dyslipidemia, hemorrhoids, hypertension comes into the ED today due to dysuria and possible yeast infection. Patient states symptoms began yesterday. She states symptoms are similar to when she previously had a UTI. She denies any vaginal discharge, bleeding, pelvic pain, hematuria, or increased frequency of urination. She also denies any systemic signs of illness. She denies taking medication for treatment of her symptoms prior to arrival.  Patient denies any alleviating or exacerbating factors for her symptoms. She describes her symptoms as moderate in severity. PCP: Leia Javier DNP    Current Outpatient Medications   Medication Sig Dispense Refill    ergocalciferol (ERGOCALCIFEROL) 1,250 mcg (50,000 unit) capsule Take 1 Capsule by mouth every seven (7) days. 13 Capsule 3    multivitamin, tx-iron-ca-min (THERA-M w/ IRON) 9 mg iron-400 mcg tab tablet Take 1 Tablet by mouth daily. (Patient not taking: Reported on 4/22/2022)         Past History     Past Medical History:   Past Medical History:   Diagnosis Date    Anal fissure     Class 2 severe obesity due to excess calories with serious comorbidity in adult [facility])     Dyslipidemia 4/25/2022    Elevated blood pressure reading without diagnosis of hypertension     GERD (gastroesophageal reflux disease)     avoids food triggers, ginger ale TUMS    Hemorrhoids     Refraction disorder     Supposed to wear glasses but does not.      Vitamin D deficiency 4/25/2022       Past Surgical History:  Past Surgical History:   Procedure Laterality Date    FLEXIBLE SIGMOIDOSCOPY N/A 3/12/2021    SIGMOIDOSCOPY FLEXIBLE with bxs performed by Idris Boucher Jose L French [facility]COPY    HX HEMORRHOIDECTOMY         Family History:  Family History   Problem Relation Age of Onset    Hypertension Mother     Diabetes Brother     Cancer Neg Hx     Heart Disease Neg Hx     Stroke Neg Hx        Social History:   Social History     Tobacco Use    Smoking status: Never Smoker    Smokeless tobacco: Never Used   Vaping Use    Vaping Use: Never used   Substance Use Topics    Alcohol use: Yes     Comment: occasional    Drug use: No       Allergies:  No Known Allergies    PMH, PSH, family history, social history, allergies reviewed with the patient with significant items noted above. Review of Systems   Review of Systems   Constitutional: Negative for chills and fever. HENT: Negative for sore throat. Eyes: Negative for visual disturbance. Respiratory: Negative for shortness of breath. Cardiovascular: Negative for chest pain. Gastrointestinal: Negative for abdominal pain, nausea and vomiting. Genitourinary: Positive for dysuria. Negative for difficulty urinating, frequency, hematuria, pelvic pain, urgency, vaginal bleeding and vaginal discharge. Musculoskeletal: Negative for myalgias. Skin: Negative for rash. Neurological: Negative for headaches. Physical Exam   There were no vitals filed for this visit. Physical Exam  Vitals and nursing note reviewed. Constitutional:       General: She is not in acute distress. Appearance: Normal appearance. She is not ill-appearing or toxic-appearing. HENT:      Head: Normocephalic and atraumatic. Mouth/Throat:      Mouth: Mucous membranes are moist.   Eyes:      General: No scleral icterus. Conjunctiva/sclera: Conjunctivae normal.   Cardiovascular:      Rate and Rhythm: Normal rate and regular rhythm. Comments: Normal peripheral perfusion  Pulmonary:      Effort: Pulmonary effort is normal. No respiratory distress. Abdominal:      General: There is no distension.       Palpations: Abdomen is soft. Tenderness: There is no abdominal tenderness. Musculoskeletal:         General: No deformity. Normal range of motion. Cervical back: Normal range of motion and neck supple. Skin:     General: Skin is warm and dry. Findings: No rash. Neurological:      General: No focal deficit present. Mental Status: She is alert and oriented to person, place, and time. Mental status is at baseline. Psychiatric:         Mood and Affect: Mood normal.         Thought Content: Thought content normal.         Diagnostic Study Results     Labs -   No results found for this or any previous visit (from the past 12 hour(s)). Labs Reviewed - No data to display    Radiologic Studies -   No orders to display     CT Results  (Last 48 hours)    None        CXR Results  (Last 48 hours)    None          The laboratory results, imaging results, and other diagnostic exams were reviewed in the EMR. Medical Decision Making   I am the first provider for this patient. I reviewed the vital signs, available nursing notes, past medical history, past surgical history, family history and social history. Vital Signs-Reviewed the patient's vital signs. Records Reviewed: Personally, on initial evaluation    MDM:   Michelle Collazo presents with complaint of dysuria and possible use infection  DDX includes but is not limited to: UTI, yeast infection, medical screening exam    Patient overall well-appearing, no acute distress, vital signs gross within normal limits. Will obtain lab work for further evaluation of patients complaint. Will continue to monitor and evaluate patient while in the ED. Orders as below:  No orders of the defined types were placed in this encounter. ED Course:   ED Course as of 05/17/22 0500   Tue May 17, 2022   0459 Patient's UA negative for infection. Found to have clue cells and therefore will treat for bacterial vaginosis at this time.   Will provide patient with first dose of Flagyl while here. Will discharge patient home with return precautions and follow-up recommendations. Patient verbalized understanding is without any further questions [DV]      ED Course User Index  [DV] Charmayne Morton, DO           Procedures:  Procedures        Diagnosis and Disposition     CLINICAL IMPRESSION:  No diagnosis found. Current Discharge Medication List          Disposition: Home    Patient condition at time of disposition: Stable    DISCHARGE NOTE:   Pt has been reexamined. Patient has no new complaints, changes, or physical findings. Care plan outlined and precautions discussed. Results were reviewed with the patient. All medications were reviewed with the patient. All of pt's questions and concerns were addressed. Alarm symptoms and return precautions associated with chief complaint and evaluation were reviewed with the patient in detail. The patient demonstrated adequate understanding. Patient was instructed to follow up with PCP, as well as strict return precautions to the ED upon further deterioration. Patient is ready to go home. The patient is happy with this plan        Dragon Disclaimer     Please note that this dictation was completed with [employer] Signature, the computer voice recognition software. Quite often unanticipated grammatical, syntax, homophones, and other interpretive errors are inadvertently transcribed by the computer software. Please disregard these errors. Please excuse any errors that have escaped final proofreading. Bennie SANTOS.

## 2022-05-22 PROBLEM — Z00.00 LABORATORY TESTS ORDERED AS PART OF A COMPLETE PHYSICAL EXAM (CPE): Status: RESOLVED | Noted: 2022-04-22 | Resolved: 2022-05-22

## 2022-06-01 ENCOUNTER — HOSPITAL ENCOUNTER (EMERGENCY)
Age: 37
Discharge: HOME | End: 2022-06-02
Attending: EMERGENCY MEDICINE
Payer: COMMERCIAL

## 2022-06-01 ENCOUNTER — APPOINTMENT (OUTPATIENT)
Dept: GENERAL RADIOLOGY | Age: 37
End: 2022-06-01
Attending: EMERGENCY MEDICINE
Payer: COMMERCIAL

## 2022-06-01 DIAGNOSIS — J06.9 VIRAL UPPER RESPIRATORY TRACT INFECTION: Primary | ICD-10-CM

## 2022-06-01 PROCEDURE — 93005 ELECTROCARDIOGRAM TRACING: CPT

## 2022-06-01 PROCEDURE — 71046 X-RAY EXAM CHEST 2 VIEWS: CPT

## 2022-06-01 PROCEDURE — 99285 EMERGENCY DEPT VISIT HI MDM: CPT

## 2022-06-01 NOTE — ED TRIAGE NOTES
Patient A/O x 4, presented to the ED with complaint of cough, sore throat, chest pain x 4 days. Denies fever, body aches, chills.

## 2022-06-02 VITALS
HEART RATE: 103 BPM | OXYGEN SATURATION: 100 % | DIASTOLIC BLOOD PRESSURE: 89 MMHG | TEMPERATURE: 98 F | RESPIRATION RATE: 19 BRPM | SYSTOLIC BLOOD PRESSURE: 134 MMHG

## 2022-06-02 LAB
ATRIAL RATE: 89 BPM
CALCULATED P AXIS, ECG09: 47 DEGREES
CALCULATED R AXIS, ECG10: 28 DEGREES
CALCULATED T AXIS, ECG11: 27 DEGREES
DIAGNOSIS, 93000: NORMAL
P-R INTERVAL, ECG05: 174 MS
Q-T INTERVAL, ECG07: 368 MS
QRS DURATION, ECG06: 94 MS
QTC CALCULATION (BEZET), ECG08: 447 MS
SARS-COV-2, COV2: NORMAL
SARS-COV-2, NAA: NOT DETECTED
VENTRICULAR RATE, ECG03: 89 BPM

## 2022-06-02 PROCEDURE — U0003 INFECTIOUS AGENT DETECTION BY NUCLEIC ACID (DNA OR RNA); SEVERE ACUTE RESPIRATORY SYNDROME CORONAVIRUS 2 (SARS-COV-2) (CORONAVIRUS DISEASE [COVID-19]), AMPLIFIED PROBE TECHNIQUE, MAKING USE OF HIGH THROUGHPUT TECHNOLOGIES AS DESCRIBED BY CMS-2020-01-R: HCPCS

## 2022-06-02 NOTE — ED PROVIDER NOTES
32 yo F with no relevant PMHx presents with a few days not feeling well. Pt states she started to have some cough, chest pain, sore throat, and losing her voice for a few days as well as some diarrhea. Pt notes she was around someone that has covid. No known fevers. Past Medical History:   Diagnosis Date    Anal fissure     Class 2 severe obesity due to excess calories with serious comorbidity in adult [facility])     Dyslipidemia 4/25/2022    Elevated blood pressure reading without diagnosis of hypertension     GERD (gastroesophageal reflux disease)     avoids food triggers, ginger ale TUMS    Hemorrhoids     Refraction disorder     Supposed to wear glasses but does not.      Vitamin D deficiency 4/25/2022       Past Surgical History:   Procedure Laterality Date    FLEXIBLE SIGMOIDOSCOPY N/A 3/12/2021    SIGMOIDOSCOPY FLEXIBLE with bxs performed by Flaca Gibbs MD at [facility] ENDOSCOPY    HX HEMORRHOIDECTOMY           Family History:   Problem Relation Age of Onset    Hypertension Mother     Diabetes Brother     Cancer Neg Hx     Heart Disease Neg Hx     Stroke Neg Hx        Social History     Socioeconomic History    Marital status: SINGLE     Spouse name: Not on file    Number of children: Not on file    Years of education: Not on file    Highest education level: Not on file   Occupational History    Not on file   Tobacco Use    Smoking status: Never Smoker    Smokeless tobacco: Never Used   Vaping Use    Vaping Use: Never used   Substance and Sexual Activity    Alcohol use: Yes     Comment: occasional    Drug use: No    Sexual activity: Not Currently     Birth control/protection: None   Other Topics Concern    Not on file   Social History Narrative    ** Merged History Encounter **          Social Determinants of Health     Financial Resource Strain:     Difficulty of Paying Living Expenses: Not on file   Food Insecurity:     Worried About [address] in the Last Year: Not on file    Ran Out of Food in the Last Year: Not on file   Transportation Needs:     Lack of Transportation (Medical): Not on file    Lack of Transportation (Non-Medical): Not on file   Physical Activity:     Days of Exercise per Week: Not on file    Minutes of Exercise per Session: Not on file   Stress:     Feeling of Stress : Not on file   Social Connections:     Frequency of Communication with Friends and Family: Not on file    Frequency of Social Gatherings with Friends and Family: Not on file    Attends Baptism Services: Not on file    Active Member of [address] [place] [address] [employer] or Organizations: Not on file    Attends Club or Organization Meetings: Not on file    Marital Status: Not on file   Intimate Partner Violence:     Fear of Current or Ex-Partner: Not on file    Emotionally Abused: Not on file    Physically Abused: Not on file    Sexually Abused: Not on file   Housing Stability:     Unable to Pay for Housing in the Last Year: Not on file    Number of [place] in the Last Year: Not on file    Unstable Housing in the Last Year: Not on file         ALLERGIES: Patient has no known allergies. Review of Systems   Constitutional: Negative for fever. HENT: Positive for sore throat. Negative for trouble swallowing. Respiratory: Positive for cough. Cardiovascular: Positive for chest pain. Gastrointestinal: Positive for diarrhea. Negative for abdominal pain and vomiting. Genitourinary: Negative for difficulty urinating. Skin: Negative for wound. Neurological: Negative for syncope. Psychiatric/Behavioral: Negative for behavioral problems. All other systems reviewed and are negative. Vitals:    06/01/22 1950   BP: (!) 143/111   Pulse: (!) 103   Resp: 19   Temp: 99.1 °F (37.3 °C)   SpO2: 99%            Physical Exam  Vitals and nursing note reviewed. Constitutional:       General: She is not in acute distress. Appearance: She is well-developed.       Comments: well-appearing, nad   HENT:      Head: Normocephalic and atraumatic. Eyes:      Extraocular Movements: Extraocular movements intact. Cardiovascular:      Rate and Rhythm: Normal rate. Pulmonary:      Effort: Pulmonary effort is normal. No respiratory distress. Breath sounds: Normal breath sounds. Abdominal:      Palpations: Abdomen is soft. Tenderness: There is no abdominal tenderness. Musculoskeletal:         General: Normal range of motion. Cervical back: Normal range of motion. Comments: Mechanically stable   Skin:     General: Skin is warm. Neurological:      General: No focal deficit present. Mental Status: She is alert and oriented to person, place, and time. Comments: No focal deficits noted   Psychiatric:         Behavior: Behavior normal.          MDM  Number of Diagnoses or Management Options  Viral upper respiratory tract infection  Diagnosis management comments: 30 yo F with no relevant PMHx presents with a few days not feeling well - cough, sore throat, chest pain.  +covid exposure. Examination generally unremarkable with ctab, no increased wob. EKG normal and cxr ok to my read. Pt may have covid or another viral URI but seems stable for home care. Discussed results and poc for dc home, symptom management, follow-up, return precautions.        Amount and/or Complexity of Data Reviewed  Tests in the radiology section of CPT®: ordered and reviewed  Review and summarize past medical records: yes  Independent visualization of images, tracings, or specimens: yes    Patient Progress  Patient progress: stable         EKG    Date/Time: 6/1/2022 11:34 PM  Performed by: Seamus Vera MD  Authorized by: Seamus Vera MD     ECG reviewed by ED Physician in the absence of a cardiologist: yes    Previous ECG:     Previous ECG:  Compared to current    Comparison ECG info:  2/4/20    Similarity:  No change  Interpretation:     Interpretation: normal    Rate:     ECG rate:  89    ECG rate assessment: normal    Rhythm:     Rhythm: sinus rhythm    Ectopy:     Ectopy: none    QRS:     QRS axis:  Normal  Conduction:     Conduction: normal    ST segments:     ST segments:  Normal  T waves:     T waves: normal        PROGRESS NOTES    11:35 PM:   Laury Neal MD arrives to the bedside to evaluate the patient. Answered the patient's questions regarding the treatment plan. CONSULTATIONS  None      MEDICATIONS ORDERED  Medications - No data to display    RADIOLOGY INTERPRETATIONS  XR CHEST PA LAT    (Results Pending)       EKG READINGS/LABORATORY RESULTS  Recent Results (from the past 12 hour(s))   EKG, 12 LEAD, INITIAL    Collection Time: 06/01/22  7:57 PM   Result Value Ref Range    Ventricular Rate 89 BPM    Atrial Rate 89 BPM    P-R Interval 174 ms    QRS Duration 94 ms    Q-T Interval 368 ms    QTC Calculation (Bezet) 447 ms    Calculated P Axis 47 degrees    Calculated R Axis 28 degrees    Calculated T Axis 27 degrees    Diagnosis       Normal sinus rhythm  Normal ECG  When compared with ECG of 04-FEB-2020 21:04,  No significant change was found         ED DIAGNOSIS & DISPOSITION INFORMATION  Diagnosis:   1. Viral upper respiratory tract infection        Disposition: Discharged    Follow-up Information     Follow up With Specialties Details Why Contact Info    Ravindra Azevedo Nurse Practitioner Schedule an appointment as soon as possible for a visit   [address] [address] [address] [place] [address] [address] [place] [address]      [id number] [place] [address] EMERGENCY DEPT Emergency Medicine  As needed [facility]  [phone]          Patient's Medications   Start Taking    No medications on file   Continue Taking    ERGOCALCIFEROL (ERGOCALCIFEROL) 1,250 MCG (50,000 UNIT) CAPSULE    Take 1 Capsule by mouth every seven (7) days. MULTIVITAMIN, TX-IRON-CA-MIN (THERA-M W/ IRON) 9 MG IRON-400 MCG TAB TABLET    Take 1 Tablet by mouth daily.    These Medications have changed    No medications on file   Stop Taking    No medications on file           [place] MD.

## 2022-06-06 ENCOUNTER — TELEPHONE (OUTPATIENT)
Dept: INTERNAL MEDICINE CLINIC | Age: 37
End: 2022-06-06

## 2022-06-07 ENCOUNTER — VIRTUAL VISIT (OUTPATIENT)
Dept: INTERNAL MEDICINE CLINIC | Age: 37
End: 2022-06-07
Payer: COMMERCIAL

## 2022-06-07 DIAGNOSIS — J06.9 ACUTE URI: Primary | ICD-10-CM

## 2022-06-07 DIAGNOSIS — R05.8 PRODUCTIVE COUGH: ICD-10-CM

## 2022-06-07 PROCEDURE — 99213 OFFICE O/P EST LOW 20 MIN: CPT | Performed by: NURSE PRACTITIONER

## 2022-06-07 RX ORDER — BENZONATATE 100 MG/1
100 CAPSULE ORAL
Qty: 30 CAPSULE | Refills: 0 | Status: SHIPPED | OUTPATIENT
Start: 2022-06-07 | End: 2022-06-08 | Stop reason: SDUPTHER

## 2022-06-07 RX ORDER — AMOXICILLIN 500 MG/1
500 CAPSULE ORAL 2 TIMES DAILY
Qty: 20 CAPSULE | Refills: 0 | Status: SHIPPED | OUTPATIENT
Start: 2022-06-07 | End: 2022-06-08 | Stop reason: SDUPTHER

## 2022-06-07 NOTE — PROGRESS NOTES
Chief Complaint   Patient presents with    Sore Throat     painful when swallowing x 1 week    Cough     productive cough green phlegm x 1 week     1. \"Have you been to the ER, urgent care clinic since your last visit? Hospitalized since your last visit? \" No    2. \"Have you seen or consulted any other health care providers outside of the [address] [place] [address] since your last visit? \" No     3. For patients aged 39-70: Has the patient had a colonoscopy / FIT/ Cologuard? NA - based on age      If the patient is female:    4. For patients aged 41-77: Has the patient had a mammogram within the past 2 years? NA - based on age or sex      11. For patients aged 21-65: Has the patient had a pap smear?  No

## 2022-06-07 NOTE — PROGRESS NOTES
[facility]ists of [address] [facility]  [place], [address]  [phone] [facility][phone] fax    6/7/2022    HPI:   Sunita Mcmillan 1985 is a pleasant BLACK/ female who presents virtual today for a sick visit. Past Medical History:   Diagnosis Date    Anal fissure     Class 2 severe obesity due to excess calories with serious comorbidity in adult [facility])     Dyslipidemia 4/25/2022    Elevated blood pressure reading without diagnosis of hypertension     GERD (gastroesophageal reflux disease)     avoids food triggers, ginger ale TUMS    Hemorrhoids     Refraction disorder     Supposed to wear glasses but does not.  Vitamin D deficiency 4/25/2022     Past Surgical History:   Procedure Laterality Date    FLEXIBLE SIGMOIDOSCOPY N/A 3/12/2021    SIGMOIDOSCOPY FLEXIBLE with bxs performed by Princess Jackson MD at [address] [place] [address] HX HEMORRHOIDECTOMY       Current Outpatient Medications   Medication Sig    amoxicillin (AMOXIL) 500 mg capsule Take 1 Capsule by mouth two (2) times a day for 10 days. Indications: a common cold    benzonatate (Tessalon Perles) 100 mg capsule Take 1 Capsule by mouth three (3) times daily as needed for Cough for up to 10 days. Indications: cough    ergocalciferol (ERGOCALCIFEROL) 1,250 mcg (50,000 unit) capsule Take 1 Capsule by mouth every seven (7) days.  multivitamin, tx-iron-ca-min (THERA-M w/ IRON) 9 mg iron-400 mcg tab tablet Take 1 Tablet by mouth daily. (Patient not taking: Reported on 4/22/2022)     No current facility-administered medications for this visit. Allergies and Intolerances:   No Known Allergies  Family History:   Family History   Problem Relation Age of Onset    Hypertension Mother     Diabetes Brother     Cancer Neg Hx     Heart Disease Neg Hx     Stroke Neg Hx      Social History:   She  reports that she has never smoked.  She has never used smokeless tobacco.   Social History Substance and Sexual Activity   Alcohol Use Yes    Comment: occasional     Immunization History:  Immunization History   Administered Date(s) Administered    COVID-19, Moderna Booster, PF, 0.25mL Dose 06/08/2021    COVID-19, Moderna, Primary or Immunocompromised Series, MRNA, PF, 100mcg/0.5mL 05/11/2021    Influenza Vaccine 10/01/2020    Influenza Vaccine PF 10/13/2013    TB Skin Test (PPD) Intradermal 04/25/2020    Tdap 10/13/2013       Todays concern: On Sunday patient developed a itchy throat. By Monday she started to develop a hoarse voice along with sore throat. On Tuesday her hoarseness worsened and she started to develop congestion in her chest.  On Wednesday she developed a productive cough with green sputum. She went to the ED where they tested her for COVID and she was negative. She started using Sudafed and Mucinex to break up her congestion. She continues to cough up green mucus. She has developed chest pain only when coughing due to the severity of her cough. She does have shortness of breath when she goes into a coughing fit. Review of Systems:   As above included in HPI. Physical:   Exam:   Vital Signs: (As obtained by patient/caregiver at home)  There were not vitals taken for this visit.      PHYSICAL EXAMINATION:  [ INSTRUCTIONS:  \"[x]\" Indicates a positive item  \"[]\" Indicates a negative item  -- DELETE ALL ITEMS NOT EXAMINED]      Constitutional: [x] Appears well-developed and well-nourished [x] No apparent distress      [] Abnormal - Sick but not toxic; noted patient's voice to be hoarse    Mental status: [x] Alert and awake  [x] Oriented to person/place/time [x] Able to follow commands    [] Abnormal -     Eyes:   EOM    [x]  Normal    [] Abnormal -   Sclera  [x]  Normal    [] Abnormal -          Discharge [x]  None visible   [] Abnormal -     HENT, Neck: [x] Normocephalic, atraumatic  [x] Abnormal -noted nasal and lung congestion  [] Mouth/Throat: Mucous membranes are moist    Pulmonary/Chest: [x] Respiratory effort normal   [] No visualized signs of difficulty breathing or respiratory distress        [x] Abnormal -noted wet sounding cough     Musculoskeletal:   [x] Normal gait with no signs of ataxia         [x] Normal range of motion of neck        [] Abnormal -     Neurological:        [x] No Facial Asymmetry (Cranial nerve 7 motor function) (limited exam due to video visit)          [x] No gaze palsy        [] Abnormal -          Skin:        [] No significant exanthematous lesions or discoloration noted on facial skin         [] Abnormal -did not assess           Psychiatric:       [x] Normal Affect [] Abnormal -        [x] No Hallucinations      Plan:    ICD-10-CM ICD-9-CM    1. Acute URI  J06.9 465.9 amoxicillin (AMOXIL) 500 mg capsule      benzonatate (Tessalon Perles) 100 mg capsule   2. Productive cough  R05.8 786.2      Initiate amoxicillin 500 mg twice daily x10 days  Initiate Tessalon Perles 3 times daily as needed  Instructed patient on the importance of completing antibiotic therapy  Instructed patient to avoid dairy as this may thicken her mucus  Instructed patient to stay well-hydrated and take over-the-counter vitamin C    Location:  Provider-in office  Patient: Home      Dr. Elizabeth Sahu, AGNP-C, DNP  Internists of [address] [place] [address], who was evaluated through a synchronous (real-time) audio-video encounter, and/or her healthcare decision maker, is aware that it is a billable service, which includes applicable co-pays, with coverage as determined by her insurance carrier. She provided verbal consent to proceed: Yes, and patient identification was verified. This visit was conducted pursuant to the emergency declaration under the [address] [place] [address], [address] [place] [address] authority and the [employer] Act. A caregiver was present when appropriate.  Ability to conduct physical exam was limited. The patient was located in a state where the provider was licensed to provide care.      --Jay Kurtz DNP on 6/7/2022 at 3:18 PM

## 2022-06-08 DIAGNOSIS — J06.9 ACUTE URI: ICD-10-CM

## 2022-06-08 RX ORDER — BENZONATATE 100 MG/1
100 CAPSULE ORAL
Qty: 30 CAPSULE | Refills: 0 | Status: SHIPPED | OUTPATIENT
Start: 2022-06-08 | End: 2022-06-18

## 2022-06-08 RX ORDER — AMOXICILLIN 500 MG/1
500 CAPSULE ORAL 2 TIMES DAILY
Qty: 20 CAPSULE | Refills: 0 | Status: SHIPPED | OUTPATIENT
Start: 2022-06-08 | End: 2022-06-18

## 2022-06-08 NOTE — TELEPHONE ENCOUNTER
Pt was prescribed amoxicillin and  benzonatate (Tessalon Perles [facility] on [facility] does not have it until Friday pt is asking for it to be sent to [facility] on [facility] instead

## 2022-07-15 ENCOUNTER — OFFICE VISIT (OUTPATIENT)
Dept: INTERNAL MEDICINE CLINIC | Age: 37
End: 2022-07-15
Payer: COMMERCIAL

## 2022-07-15 VITALS
HEIGHT: 65 IN | OXYGEN SATURATION: 97 % | RESPIRATION RATE: 18 BRPM | DIASTOLIC BLOOD PRESSURE: 108 MMHG | SYSTOLIC BLOOD PRESSURE: 142 MMHG | TEMPERATURE: 96.6 F | HEART RATE: 77 BPM | WEIGHT: 288.4 LBS | BODY MASS INDEX: 48.05 KG/M2

## 2022-07-15 DIAGNOSIS — E66.01 CLASS 2 SEVERE OBESITY DUE TO EXCESS CALORIES WITH SERIOUS COMORBIDITY IN ADULT, UNSPECIFIED BMI (HCC): ICD-10-CM

## 2022-07-15 DIAGNOSIS — M25.473 ANKLE SWELLING, UNSPECIFIED LATERALITY: ICD-10-CM

## 2022-07-15 DIAGNOSIS — I16.0 HYPERTENSIVE URGENCY: Primary | ICD-10-CM

## 2022-07-15 PROCEDURE — 99214 OFFICE O/P EST MOD 30 MIN: CPT | Performed by: NURSE PRACTITIONER

## 2022-07-15 RX ORDER — CHLORTHALIDONE 25 MG/1
25 TABLET ORAL DAILY
Qty: 90 TABLET | Refills: 0 | Status: SHIPPED
Start: 2022-07-15 | End: 2022-10-07 | Stop reason: ALTCHOICE

## 2022-07-15 NOTE — PROGRESS NOTES
Chief Complaint   Patient presents with    Hypertension     3 month f/u     1. \"Have you been to the ER, urgent care clinic since your last visit? Hospitalized since your last visit? \" ED, 5/17 & 6/1 UTI and URI    2. \"Have you seen or consulted any other health care providers outside of the [address] [place] [address] since your last visit? \" No     3. For patients aged 39-70: Has the patient had a colonoscopy / FIT/ Cologuard? NA - based on age      If the patient is female:    4. For patients aged 41-77: Has the patient had a mammogram within the past 2 years? NA - based on age or sex      11. For patients aged 21-65: Has the patient had a pap smear? Yes - Care [place] present.  Most recent result on file

## 2022-07-15 NOTE — PROGRESS NOTES
Internists of [address]  [place] due[place], [address]  [phone] [facility]/[phone] fax    7/15/2022    Bina Ariza 1985 is a pleasant BLACK/ female. Past Medical History:   Diagnosis Date    Anal fissure     Class 2 severe obesity due to excess calories with serious comorbidity in adult [facility])     Dyslipidemia 4/25/2022    Elevated blood pressure reading without diagnosis of hypertension     GERD (gastroesophageal reflux disease)     avoids food triggers, ginger ale TUMS    Hemorrhoids     Refraction disorder     Supposed to wear glasses but does not.  Vitamin D deficiency 4/25/2022     Past Surgical History:   Procedure Laterality Date    FLEXIBLE SIGMOIDOSCOPY N/A 3/12/2021    SIGMOIDOSCOPY FLEXIBLE with bxs performed by Joie Drake MD at [address] [place] [address] HX HEMORRHOIDECTOMY       Current Outpatient Medications   Medication Sig    chlorthalidone (HYGROTON) 25 mg tablet Take 1 Tablet by mouth daily.  ergocalciferol (ERGOCALCIFEROL) 1,250 mcg (50,000 unit) capsule Take 1 Capsule by mouth every seven (7) days.  multivitamin, tx-iron-ca-min (THERA-M w/ IRON) 9 mg iron-400 mcg tab tablet Take 1 Tablet by mouth daily. (Patient not taking: Reported on 4/22/2022)     No current facility-administered medications for this visit. Allergies and Intolerances:   No Known Allergies  Family History:   Family History   Problem Relation Age of Onset    Hypertension Mother     Diabetes Brother     Cancer Neg Hx     Heart Disease Neg Hx     Stroke Neg Hx      Social History:   She  reports that she has never smoked.  She has never used smokeless tobacco.   Social History     Substance and Sexual Activity   Alcohol Use Yes    Comment: occasional     Immunization History:  Immunization History   Administered Date(s) Administered    COVID-19, MODERNA BLUE border, Primary or Immunocompromised, (age 18y+), IM, 100 mcg/0.5mL 05/11/2021    COVID-19, MODERNA Booster BLUE border, (age 18y+), IM, 50mcg/0.25mL 06/08/2021    Influenza Vaccine 10/01/2020    Influenza Vaccine PF 10/13/2013    TB Skin Test (PPD) Intradermal 04/25/2020    Tdap 10/13/2013       Todays concerns/HPI:  Cancel bariatric consult. Started [employer] but not working. She needs to follow diet plan and exercise regimen. Instructed her to go on American Diabetes Association and review their diet plans. Not happy about starting BP meds. She was hoping she wouldn't have high BP. She does not focus on a low sodium diet. She works nights and makes difficult to eat healthy. Mild headache to back of head x2 days. Oleksandr ankle swelling. Denies CP, SOB, blurred vision, syncope, GI/ issues. Review of Systems:  Pertinent items are noted in HPI. Review of Data:  n/a    Physical:   Visit Vitals  BP (!) 142/108   Pulse 77   Temp (!) 96.6 °F (35.9 °C) (Temporal)   Resp 18   Ht 5' 5\" (1.651 m)   Wt 288 lb 6.4 oz (130.8 kg)   SpO2 97%   BMI 47.99 kg/m²        Wt Readings from Last 3 Encounters:   07/15/22 288 lb 6.4 oz (130.8 kg)   05/17/22 285 lb (129.3 kg)   04/22/22 283 lb 12.8 oz (128.7 kg)       Exam:   Physical Exam  Constitutional:       Appearance: Normal appearance. She is obese. Comments: Morbidly   HENT:      Head: Normocephalic and atraumatic. Right Ear: External ear normal.      Left Ear: External ear normal.   Eyes:      Extraocular Movements: Extraocular movements intact. Conjunctiva/sclera: Conjunctivae normal.   Cardiovascular:      Rate and Rhythm: Normal rate and regular rhythm. Pulses: Normal pulses. Heart sounds: Normal heart sounds. Pulmonary:      Effort: Pulmonary effort is normal. No respiratory distress. Breath sounds: Normal breath sounds. No wheezing. Musculoskeletal:         General: Normal range of motion. Cervical back: Normal range of motion and neck supple.       Right lower leg: Edema (1+ nonpitting edema to ankle) present. Left lower leg: Edema (1+ nonpitting edema to ankle) present. Skin:     General: Skin is warm and dry. Neurological:      General: No focal deficit present. Mental Status: She is alert and oriented to person, place, and time. Psychiatric:         Mood and Affect: Mood normal.         Behavior: Behavior normal.        Body mass index is 47.99 kg/m². Plan:  1. Hypertensive urgency  Not controlled  start, Chlorthalidone qd. Call BP results in 2 weeks. Informed pt may need to adjust dose or add a 2nd agent. Discussed possible side effects of medication  Discussed negative impact on one's health if BP goes unmanaged ie MI, CVA. Limit sodium in diet to 2g/d  Avoid pork  Initiate cardio exercise  Weight reduction  Increase water intake    - chlorthalidone (HYGROTON) 25 mg tablet; Take 1 Tablet by mouth daily. Dispense: 90 Tablet; Refill: 0  - METABOLIC PANEL, COMPREHENSIVE; Future    2. Ankle swelling, unspecified laterality  Instructed Patient on relieving and preventing leg edema. The first line of defense is: extremity elevation. Elevate legs above the level of the heart to allow gravity to help pull fluid back to the body. Other ways to decrease swelling is limiting salt intake, drink plenty of water, and avoid sitting with the feet dependent for long periods of time. Sitting for extended periods of time can also lead to edema.     - METABOLIC PANEL, COMPREHENSIVE; Future    3. Class 2 severe obesity due to excess calories with serious comorbidity in adult, unspecified BMI (HCC)  BMI is out of normal parameters and plan is as follows: Discussed in great detail on diet, portion control, exercise, avoiding foods high in sugar, carbs and starches, fatty/greasy foods and to eat until satisfied not til full. I have counseled this patient on diet and exercise regimens as well. Must follow diet plan and exercise with GOLO.  Recommended using American DM association diet plan. Reviewed medication and completed medication reconciliation with the patient. Reviewed side effects of medications with the patient. Questions were answered and patient verb understanding.       Follow up 3m labs (CMP)      Dr. Kacie Dowd, SHANNAN-C, DNP  Internists of [address] [place] [address]         (y Apr)

## 2022-07-18 ENCOUNTER — HOSPITAL ENCOUNTER (EMERGENCY)
Age: 37
Discharge: HOME | End: 2022-07-18
Attending: STUDENT IN AN ORGANIZED HEALTH CARE EDUCATION/TRAINING PROGRAM
Payer: COMMERCIAL

## 2022-07-18 VITALS
RESPIRATION RATE: 18 BRPM | TEMPERATURE: 98 F | DIASTOLIC BLOOD PRESSURE: 95 MMHG | SYSTOLIC BLOOD PRESSURE: 133 MMHG | HEART RATE: 94 BPM | BODY MASS INDEX: 47.93 KG/M2 | WEIGHT: 288 LBS

## 2022-07-18 DIAGNOSIS — Z13.9 ENCOUNTER FOR MEDICAL SCREENING EXAMINATION: Primary | ICD-10-CM

## 2022-07-18 PROCEDURE — 99282 EMERGENCY DEPT VISIT SF MDM: CPT

## 2022-07-18 NOTE — Clinical Note
[address] [facility] EMERGENCY DEPT  [address] [facility] [address] [address] [phone]    Work/School Note    Date: 7/18/2022    To Whom It May concern:    Clarisa Greco was seen and treated today in the emergency room by the following provider(s):  Attending Provider: Lyn Tariq is excused from work/school on 07/18/22 and 07/19/22. She is medically clear to return to work/school on 7/20/2022.        Sincerely,          Annie Montejo RN

## 2022-07-18 NOTE — Clinical Note
[address] [facility] EMERGENCY DEPT  [address] [address]  [phone]    Work/School Note    Date: 7/18/2022    To Whom It May concern:    Vini Tovar was seen and treated today in the emergency room by the following provider(s):  Attending Provider: Nika Henry is excused from work/school on 07/18/22 and 07/19/22. She is medically clear to return to work/school on 7/20/2022.        Sincerely,          Daniel Jimenez, DO

## 2022-07-19 NOTE — ED TRIAGE NOTES
Patient states BP at work 141/111 and her watch showed her HR was up 130. She states she just began BP medication (Chlorthalidone 25mg daily)  last Friday.

## 2022-07-19 NOTE — ED PROVIDER NOTES
EMERGENCY DEPARTMENT HISTORY AND PHYSICAL EXAM      Date: 7/18/2022  Patient Name: Ruben Smoker    History of Presenting Illness     Chief Complaint   Patient presents with    Hypertension       History (Context): Ruben Smoker is a 31 y.o. female with a past medical history significant for HTN, obesity, hyperlipidemia comes into the ED today due to Hypertension and tachycardia. Patient states she felt her heart racing about 1-2 hours prior to arrival. Patient took her BP at the time and she states it was elevated. Patient does admit to starting chlorthalidone recently this past Friday. States since that time she has been having these types of symptoms. Denies any inciting events, or relieving or exacerbating factors. Denies taking any further medication for treatment of symptoms prior to arrival.  States symptoms were severe in quality during the time. PCP: Agustín Villasenor DNP    Current Outpatient Medications   Medication Sig Dispense Refill    chlorthalidone (HYGROTON) 25 mg tablet Take 1 Tablet by mouth daily. 90 Tablet 0    ergocalciferol (ERGOCALCIFEROL) 1,250 mcg (50,000 unit) capsule Take 1 Capsule by mouth every seven (7) days. 13 Capsule 3       Past History     Past Medical History:   Past Medical History:   Diagnosis Date    Anal fissure     Class 2 severe obesity due to excess calories with serious comorbidity in adult [facility])     Dyslipidemia 4/25/2022    Elevated blood pressure reading without diagnosis of hypertension     GERD (gastroesophageal reflux disease)     avoids food triggers, ginger ale TUMS    Hemorrhoids     HTN (hypertension)     Refraction disorder     Supposed to wear glasses but does not.      Vitamin D deficiency 4/25/2022       Past Surgical History:  Past Surgical History:   Procedure Laterality Date    FLEXIBLE SIGMOIDOSCOPY N/A 3/12/2021    SIGMOIDOSCOPY FLEXIBLE with bxs performed by Azalia Case MD at [address] [place] [address] Family History:  Family History   Problem Relation Age of Onset    Hypertension Mother     Diabetes Brother     Cancer Neg Hx     Heart Disease Neg Hx     Stroke Neg Hx        Social History:   Social History     Tobacco Use    Smoking status: Never Smoker    Smokeless tobacco: Never Used   Vaping Use    Vaping Use: Never used   Substance Use Topics    Alcohol use: Yes     Comment: occasional    Drug use: No       Allergies:  No Known Allergies    PMH, PSH, family history, social history, allergies reviewed with the patient with significant items noted above. Review of Systems   Review of Systems   Constitutional: Negative for chills and fever. HENT: Negative for sore throat. Eyes: Negative for visual disturbance. Respiratory: Negative for shortness of breath. Cardiovascular: Positive for palpitations. Negative for chest pain. Gastrointestinal: Negative for abdominal pain, nausea and vomiting. Genitourinary: Negative for difficulty urinating. Musculoskeletal: Negative for myalgias. Skin: Negative for rash. Neurological: Negative for headaches. Physical Exam     Vitals:    07/18/22 2148   BP: (!) 133/95   Pulse: 94   Resp: 18   Temp: 98 °F (36.7 °C)   Weight: 130.6 kg (288 lb)       Physical Exam  Vitals and nursing note reviewed. Constitutional:       General: She is not in acute distress. Appearance: Normal appearance. She is obese. She is not ill-appearing or toxic-appearing. HENT:      Head: Normocephalic and atraumatic. Mouth/Throat:      Mouth: Mucous membranes are moist.   Eyes:      General: No scleral icterus. Conjunctiva/sclera: Conjunctivae normal.   Cardiovascular:      Rate and Rhythm: Normal rate and regular rhythm. Pulses: Normal pulses. Comments: Normal peripheral perfusion  Pulmonary:      Effort: Pulmonary effort is normal. No respiratory distress. Abdominal:      General: There is no distension.       Palpations: Abdomen is soft.      Tenderness: There is no abdominal tenderness. There is no guarding or rebound. Musculoskeletal:         General: No deformity. Normal range of motion. Cervical back: Normal range of motion and neck supple. Skin:     General: Skin is warm and dry. Findings: No rash. Neurological:      General: No focal deficit present. Mental Status: She is alert and oriented to person, place, and time. Mental status is at baseline. Psychiatric:         Mood and Affect: Mood normal.         Thought Content: Thought content normal.         Diagnostic Study Results     Labs -   No results found for this or any previous visit (from the past 12 hour(s)). Labs Reviewed - No data to display    Radiologic Studies -   No orders to display     CT Results  (Last 48 hours)    None        CXR Results  (Last 48 hours)    None          The laboratory results, imaging results, and other diagnostic exams were reviewed in the EMR. Medical Decision Making   I am the first provider for this patient. I reviewed the vital signs, available nursing notes, past medical history, past surgical history, family history and social history. Vital Signs-Reviewed the patient's vital signs. Records Reviewed: Personally, on initial evaluation    MDM:   Clarisa Greco presents with complaint of hypertension and tachycardia  DDX includes but is not limited to: Medical screening exam, hypertension, medication side effect    Patient overall well-appearing, no acute distress, vital signs grossly within normal limits. Offered patient lab work however she declined at this time. Recommended patient follow-up with her PCP for further medication adjustment and evaluation. Patient verbalized understanding is without any further questions. Orders as below:  No orders of the defined types were placed in this encounter.        ED Course:            Procedures:  Procedures        Diagnosis and Disposition     CLINICAL IMPRESSION:  1. Encounter for medical screening examination      Current Discharge Medication List          Disposition: Home    Patient condition at time of disposition: Stable    DISCHARGE NOTE:   Pt has been reexamined. Patient has no new complaints, changes, or physical findings. Care plan outlined and precautions discussed. Results were reviewed with the patient. All medications were reviewed with the patient. All of pt's questions and concerns were addressed. Alarm symptoms and return precautions associated with chief complaint and evaluation were reviewed with the patient in detail. The patient demonstrated adequate understanding. Patient was instructed to follow up with PCP, as well as strict return precautions to the ED upon further deterioration. Patient is ready to go home. The patient is happy with this plan        Dragon Disclaimer     Please note that this dictation was completed with [employer], the computer voice recognition software. Quite often unanticipated grammatical, syntax, homophones, and other interpretive errors are inadvertently transcribed by the computer software. Please disregard these errors. Please excuse any errors that have escaped final proofreading. Kristy SANTOS.

## 2022-09-29 ENCOUNTER — OFFICE VISIT (OUTPATIENT)
Dept: SURGERY | Age: 37
End: 2022-09-29
Payer: COMMERCIAL

## 2022-09-29 VITALS
HEIGHT: 65 IN | TEMPERATURE: 97.1 F | HEART RATE: 67 BPM | DIASTOLIC BLOOD PRESSURE: 83 MMHG | WEIGHT: 288.1 LBS | OXYGEN SATURATION: 99 % | SYSTOLIC BLOOD PRESSURE: 134 MMHG | BODY MASS INDEX: 48 KG/M2

## 2022-09-29 DIAGNOSIS — E66.01 MORBID OBESITY (HCC): Primary | ICD-10-CM

## 2022-09-29 DIAGNOSIS — I10 HYPERTENSION, UNSPECIFIED TYPE: ICD-10-CM

## 2022-09-29 DIAGNOSIS — E66.01 MORBID OBESITY WITH BMI OF 45.0-49.9, ADULT (HCC): ICD-10-CM

## 2022-09-29 PROCEDURE — 99214 OFFICE O/P EST MOD 30 MIN: CPT | Performed by: NURSE PRACTITIONER

## 2022-09-29 NOTE — PROGRESS NOTES
Bariatric Surgery Consultation - Restart  Original Consult end Jan 2022 weight 277 lbs    Subjective:     Dev Marques is a 30 y.o. obese female with a Body mass index is 47.94 kg/m². Avinash Ra Dev Marques desires surgery at this time because of health issues and quality of life issues. Dev Marques has been seen by a bariatric nutritionist and has been placed on an appropriate low carbohydrate diet. The patient desires laparoscopic sleeve gastrectomy for surgical weight loss, however she is not currently a surgical candidate due to pending work up. Dev Marques is here today to check progress with weight loss / evaluate nutritional status and review all subspecialty clearances in hopes of proceeding to the operating room. Office visit notes from January 2022 to present have been reviewed. Dev Marques has increased fluid intake, is focusing on protein, is eating regularly, is taking a multivitamin & has increased her activity. No recent visits with PCP. No new medications. She initially consulted in January 2022, completed her UGI and first nutrition class, but started having issues with her blood pressure and decided to pause her insurance criteria to focus on improving her hypertension. Has been started on medication and returns today to resume criteria process. Her UGI was normal 2/23/22.     Patient Active Problem List    Diagnosis Date Noted    HTN (hypertension) 09/29/2022    Vitamin D deficiency 04/25/2022    Dyslipidemia 04/25/2022    GERD (gastroesophageal reflux disease)     Class 2 severe obesity due to excess calories with serious comorbidity in adult ([facility] [address]     Elevated blood pressure reading without diagnosis of hypertension       Past Surgical History:   Procedure Laterality Date    FLEXIBLE SIGMOIDOSCOPY N/A 3/12/2021    SIGMOIDOSCOPY FLEXIBLE with bxs performed by Ubaldo Rubi MD at [facility] History     Tobacco Use Smoking status: Never    Smokeless tobacco: Never   Substance Use Topics    Alcohol use: Yes     Comment: occasional      Family History   Problem Relation Age of Onset    Hypertension Mother     Diabetes Brother     Cancer Neg Hx     Heart Disease Neg Hx     Stroke Neg Hx       Current Outpatient Medications   Medication Sig Dispense Refill    chlorthalidone (HYGROTON) 25 mg tablet Take 1 Tablet by mouth daily. 90 Tablet 0    ergocalciferol (ERGOCALCIFEROL) 1,250 mcg (50,000 unit) capsule Take 1 Capsule by mouth every seven (7) days.  13 Capsule 3     No Known Allergies       Review of Systems:        General - No history or complaints of unexpected fever, chills, or weight loss  Head/Neck - No history or complaints of headache, diplopia, dysphagia, hearing loss  Cardiac - No history or complaints of chest pain, palpitations, murmur, or shortness of breath  Pulmonary - No history or complaints of shortness of breath, productive cough, hemoptysis  Gastrointestinal - no reflux,  abdominal pain, obstipation/constipation, blood per rectum  Genitourinary - No history or complaints of hematuria/dysuria, stress urinary incontinence symptoms, or renal lithiasis  Musculoskeletal - mild joint pain in low back, no muscular weakness  Hematologic - No history or complaints of bleeding disorders, blood transfusions, sickle cell anemia  Neurologic - No history or complaints of  migraine headaches, seizure activity, syncopal episodes, TIA or stroke  Integumentary - No history or complaints of rashes, abnormal nevi, skin cancer  Gynecological - No history of heavy menses/abnormal menses    Objective:     Visit Vitals  /83 (BP 1 Location: Left upper arm, BP Patient Position: Sitting, BP Cuff Size: Large adult)   Pulse 67   Temp 97.1 °F (36.2 °C)   Ht 5' 5\" (1.651 m)   Wt 130.7 kg (288 lb 1.6 oz)   SpO2 99%   BMI 47.94 kg/m²       Physical Exam:    General appearance:  alert, cooperative, no distress, appears stated age Mental status   alert, oriented to person, place, and time   Neck  supple, no significant adenopathy     Lymphatics  no palpable lymphadenopathy, no hepatosplenomegaly   Chest  clear to auscultation, no wheezes, rales or rhonchi, symmetric air entry   Heart  normal rate, regular rhythm, normal S1, S2, no murmurs, rubs, clicks or gallops    Abdomen: soft, nontender, nondistended, no masses or organomegaly   Neurological  alert, oriented, normal speech, no focal findings or movement disorder noted   Musculoskeletal no joint tenderness, deformity or swelling   Extremities peripheral pulses normal, no pedal edema, no clubbing or cyanosis   Skin normal coloration and turgor, no rashes, no suspicious skin lesions noted        Labs:     No results found for this or any previous visit (from the past 2016 hour(s)). Assessment:     Morbid obesity with associated comorbidity of hypertension, severe central obesity & outstanding insurance requirements. Plan: To continue current medications & routine follow-up with PCP. Continuation of Pre-Operative evaluation / clearance:  Gracia Russell has returned to the office today to discuss her status as a surgical candidate. Progress has been noted and reviewed - including review of notes from dietician. Gracia Russell is being compliant with follow-up & recommendations. Gracia Russell has 15 more pounds to lose before proceeding to the OR. (11 pounds gained since last visit)  Gracia Russell has 3 more nutritional visits to complete before proceeding to the OR. Additionally, 0 more medical visits are required. Gracia Russell has an outstanding psychological and PCP clearance to review before proceeding to the OR. Gracia Russell will return in 1 month to continue the pre-operative process and to work towards goals as outlined.       Gracia Russell understand the rationales for all the above and plans to follow the diet & activity recommendations of the dietician. It has been discussed that given her morbidly obese condition that the best surgical option for this patient would be the laparoscopic sleeve gastrectomy. Malvin Mckenzie agrees with the surgical choice and has been educated in it's; risks, benefits, and alternatives. We will continue with the pre-operative evaluation as needed to check progress. Secondary Diagnoses:     Dietary Intervention  - The patient is currently followed by a bariatric nutritionist for an attempt at preoperative weight loss as has been dictated by their insurance carrier. They will be assessed at various times during their follow up to evaluate their progress depending on the length of time that is required once again by their carrier. I have explained the importance of preoperative weight loss and the benefits regarding lower surgical risk and also assisting the patient in reaching their weight loss goal.  Finally they understand there is a physiologic benefit from the standpoint of hepatic volume reduction preoperatively. I have reiterated the importance of a low carbohydrate and high protein regimen to achieve their stated goal.The patients weight loss goal pre operatively is 15 pounds. Hypertension - the patient understands, and we have discussed in detail, that this is an active acute health problem that has a multifactorial effect on their body from the standpoint of healing. They understand that uncontrolled hypertension affects other organ processes and this leads to risk associated with surgical procedures. They understand that in addition to hypertension, once they develop other health issues, their risk is exponentially worse. Currently they understand that this likely one of the single most important items that they need to control in the perioperative process.   They understand that the [facility] protocol is that patients entering the operative suite should have a blood pressure reading less than 140/90 prior to surgery. Elevated readings today in office above 160/90 are recommended emergent PCP follow-up or if symptomatic to proceed to the ED. The patient has a clear understanding of how weight loss improves hypertension as a whole, but also they understand that there is a significant genetic component to this disease process. We will monitor the patients blood pressure while in the hospital and the plan would be to continue those medications postoperatively. If a diuretic is being used we will stop them on discharge to prevent dehydration particularly with the sleeve gastrectomy and the gastric bypass procedures. They will be instructed to monitor their blood pressure postoperatively while at home and notify their primary care physician in the event of any significantly high or uncharacteristic readings. Ms. Wanda Juárez has a reminder for a \"due or due soon\" health maintenance. I have asked that she contact her primary care provider for follow-up on this health maintenance.       Signed By: Patel Sheriff NP     September 29, 2022

## 2022-10-05 ENCOUNTER — HOSPITAL ENCOUNTER (OUTPATIENT)
Dept: LAB | Age: 37
Discharge: HOME | End: 2022-10-05
Payer: COMMERCIAL

## 2022-10-05 ENCOUNTER — APPOINTMENT (OUTPATIENT)
Dept: INTERNAL MEDICINE CLINIC | Age: 37
End: 2022-10-05

## 2022-10-05 ENCOUNTER — TELEPHONE (OUTPATIENT)
Dept: INTERNAL MEDICINE CLINIC | Age: 37
End: 2022-10-05

## 2022-10-05 DIAGNOSIS — M25.473 ANKLE SWELLING, UNSPECIFIED LATERALITY: ICD-10-CM

## 2022-10-05 DIAGNOSIS — I16.0 HYPERTENSIVE URGENCY: ICD-10-CM

## 2022-10-05 LAB
ALBUMIN SERPL-MCNC: 3.5 G/DL (ref 3.4–5)
ALBUMIN/GLOB SERPL: 0.8 {RATIO} (ref 0.8–1.7)
ALP SERPL-CCNC: 81 U/L (ref 45–117)
ALT SERPL-CCNC: 25 U/L (ref 13–56)
ANION GAP SERPL CALC-SCNC: 9 MMOL/L (ref 3–18)
AST SERPL-CCNC: 13 U/L (ref 10–38)
BILIRUB SERPL-MCNC: 0.8 MG/DL (ref 0.2–1)
BUN SERPL-MCNC: 13 MG/DL (ref 7–18)
BUN/CREAT SERPL: 21 (ref 12–20)
CALCIUM SERPL-MCNC: 9.4 MG/DL (ref 8.5–10.1)
CHLORIDE SERPL-SCNC: 102 MMOL/L (ref 100–111)
CO2 SERPL-SCNC: 28 MMOL/L (ref 21–32)
CREAT SERPL-MCNC: 0.61 MG/DL (ref 0.6–1.3)
GLOBULIN SER CALC-MCNC: 4.2 G/DL (ref 2–4)
GLUCOSE SERPL-MCNC: 102 MG/DL (ref 74–99)
POTASSIUM SERPL-SCNC: 3.1 MMOL/L (ref 3.5–5.5)
PROT SERPL-MCNC: 7.7 G/DL (ref 6.4–8.2)
SODIUM SERPL-SCNC: 139 MMOL/L (ref 136–145)

## 2022-10-05 PROCEDURE — 36415 COLL VENOUS BLD VENIPUNCTURE: CPT

## 2022-10-05 PROCEDURE — 80053 COMPREHEN METABOLIC PANEL: CPT

## 2022-10-05 NOTE — TELEPHONE ENCOUNTER
Patient came by and dropped off a Medical Recommendation for Surgical Weight Loss form , requesting for Dr. Herber Munoz to fill out. Form placed in box.

## 2022-10-07 ENCOUNTER — OFFICE VISIT (OUTPATIENT)
Dept: INTERNAL MEDICINE CLINIC | Age: 37
End: 2022-10-07
Payer: COMMERCIAL

## 2022-10-07 VITALS
HEIGHT: 65 IN | OXYGEN SATURATION: 98 % | SYSTOLIC BLOOD PRESSURE: 124 MMHG | DIASTOLIC BLOOD PRESSURE: 87 MMHG | HEART RATE: 78 BPM | BODY MASS INDEX: 48.15 KG/M2 | WEIGHT: 289 LBS | RESPIRATION RATE: 18 BRPM | TEMPERATURE: 97.7 F

## 2022-10-07 DIAGNOSIS — E66.01 CLASS 3 SEVERE OBESITY DUE TO EXCESS CALORIES WITH SERIOUS COMORBIDITY AND BODY MASS INDEX (BMI) OF 45.0 TO 49.9 IN ADULT (HCC): ICD-10-CM

## 2022-10-07 DIAGNOSIS — I10 PRIMARY HYPERTENSION: Primary | ICD-10-CM

## 2022-10-07 DIAGNOSIS — E87.6 HYPOKALEMIA: ICD-10-CM

## 2022-10-07 DIAGNOSIS — Z23 NEEDS FLU SHOT: ICD-10-CM

## 2022-10-07 PROBLEM — E66.813 CLASS 3 SEVERE OBESITY DUE TO EXCESS CALORIES WITH SERIOUS COMORBIDITY AND BODY MASS INDEX (BMI) OF 45.0 TO 49.9 IN ADULT: Status: ACTIVE | Noted: 2022-10-07

## 2022-10-07 PROCEDURE — 90471 IMMUNIZATION ADMIN: CPT | Performed by: NURSE PRACTITIONER

## 2022-10-07 PROCEDURE — 99213 OFFICE O/P EST LOW 20 MIN: CPT | Performed by: NURSE PRACTITIONER

## 2022-10-07 PROCEDURE — 90686 IIV4 VACC NO PRSV 0.5 ML IM: CPT | Performed by: NURSE PRACTITIONER

## 2022-10-07 RX ORDER — TRIAMTERENE CAPSULES 50 MG/1
50 CAPSULE ORAL 2 TIMES DAILY
Qty: 180 CAPSULE | Refills: 0 | Status: SHIPPED | OUTPATIENT
Start: 2022-10-07

## 2022-10-07 NOTE — PROGRESS NOTES
Ba Arguello 1985 female who presents for routine immunizations. Patient denies any symptoms , reactions or allergies that would exclude them from being immunized today. Risks and adverse reactions were discussed and the VIS was given to them. All questions were addressed. Order placed for INFLUENZA in RIGHT deltoid, per Verbal Order from [facility]. DNP, with read back. Patient was observed for 15 min post injection. There were no reactions observed.     Colon Ro CCMA

## 2022-10-07 NOTE — PROGRESS NOTES
[facility] [id number] [address]   [place], [address] [place]  [phone] [facility]/[phone] fax    10/7/2022    Emily Dunaway 1985 is a pleasant BLACK/ female. Todays concerns/HPI:  1. Hypertension. Since starting BP medications she has been feeling much better. She denies headaches. She is tolerating chlorthalidone without side effects. 2.  Obesity. She brought in a form to be signed from the weight loss center. She is going to retry the program.    No other complaints or concerns    Past Medical History:   Diagnosis Date    Anal fissure     Class 3 severe obesity due to excess calories with serious comorbidity and body mass index (BMI) of 45.0 to 49.9 in adult [facility]) 10/07/2022    Dyslipidemia 04/25/2022    GERD (gastroesophageal reflux disease)     avoids food triggers, ginger ale TUMS    Hypokalemia 10/7/2022    Primary hypertension 9/29/2022    Refraction disorder     Supposed to wear glasses but does not.  Vitamin D deficiency 04/25/2022     Current Outpatient Medications   Medication Sig    triamterene (DYRENIUM) 50 mg capsule Take 1 Capsule by mouth two (2) times a day.  ergocalciferol (ERGOCALCIFEROL) 1,250 mcg (50,000 unit) capsule Take 1 Capsule by mouth every seven (7) days. No current facility-administered medications for this visit. Review of Systems:  Pertinent items are noted in HPI. Physical:   Visit Vitals  /87   Pulse 78   Temp 97.7 °F (36.5 °C) (Temporal)   Resp 18   Ht 5' 5\" (1.651 m)   Wt 289 lb (131.1 kg)   LMP 09/16/2022   SpO2 98%   BMI 48.09 kg/m²      Wt Readings from Last 3 Encounters:   10/07/22 289 lb (131.1 kg)   09/29/22 288 lb 1.6 oz (130.7 kg)   07/18/22 288 lb (130.6 kg)       Exam:   Physical Exam  Constitutional:       Appearance: Normal appearance. She is obese. Neck:      Vascular: No carotid bruit. Cardiovascular:      Rate and Rhythm: Normal rate and regular rhythm. Comments: No edema noted bilateral lower extremities  Pulmonary:      Effort: Pulmonary effort is normal. No respiratory distress. Breath sounds: Normal breath sounds. No wheezing. Musculoskeletal:         General: Normal range of motion. Skin:     General: Skin is warm and dry. Neurological:      General: No focal deficit present. Mental Status: She is alert and oriented to person, place, and time. Psychiatric:         Behavior: Behavior normal.      Body mass index is 48.09 kg/m². Review of Data:  Potassium 3.1. Plan:    1. Primary hypertension  7/15/2022 initiated chlorthalidone  Unfortunately chlorthalidone has led to hypokalemia  Discontinue chlorthalidone  Initiate triamterene  Reduce sodium in diet  Increase exercise to burn calories  Increase water intake    - triamterene (DYRENIUM) 50 mg capsule; Take 1 Capsule by mouth two (2) times a day. Dispense: 180 Capsule; Refill: 0    2. Hypokalemia  DC chlorthalidone  Obtain updated CMP week of 11/7/2022    - triamterene (DYRENIUM) 50 mg capsule; Take 1 Capsule by mouth two (2) times a day. Dispense: 180 Capsule; Refill: 0  - METABOLIC PANEL, COMPREHENSIVE; Future    3. Class 3 severe obesity due to excess calories with serious comorbidity and body mass index (BMI) of 45.0 to 49.9 in [facility])  Form completed at the request of patient for bariatric services. Discussed the importance of following through with the program in order to see benefit. Discussed health issues with obesity  With weight loss, I anticipate better control of blood pressure. 4. Needs flu shot    - INFLUENZA, FLUARIX, FLULAVAL, FLUZONE (AGE 6 MO+), AFLURIA(AGE 3Y+) IM, PF, 0.5 ML      Reviewed medication and completed medication reconciliation with the patient. Reviewed side effects of medications with the patient. Questions were answered and patient verb understanding.     Past Surgical History:   Procedure Laterality Date    FLEXIBLE SIGMOIDOSCOPY N/A 3/12/2021    SIGMOIDOSCOPY FLEXIBLE with bxs performed by Gaurav Ferreira MD at [facility] ENDOSCOPY    HX HEMORRHOIDECTOMY       Allergies and Intolerances:   No Known Allergies  Family History:   Family History   Problem Relation Age of Onset    Hypertension Mother     Diabetes Brother     Cancer Neg Hx     Heart Disease Neg Hx     Stroke Neg Hx      Social History:   She  reports that she has never smoked.  She has never used smokeless tobacco.   Social History     Substance and Sexual Activity   Alcohol Use Yes    Comment: occasional     Immunization History:  Immunization History   Administered Date(s) Administered    COVID-19, MODERNA BLUE border, Primary or Immunocompromised, (age 18y+), IM, 100 mcg/0.5mL 05/11/2021    COVID-19, MODERNA Booster BLUE border, (age 18y+), IM, 50mcg/0.25mL 06/08/2021    Influenza Vaccine 10/01/2020    Influenza Vaccine PF 10/13/2013    TB Skin Test (PPD) Intradermal 04/25/2020    Tdap 10/13/2013         Follow up 3 months      Dr. Kendall Higuera, AGNP-C, DNP  [facility] [address] [place] [address]       ([facility])

## 2022-10-07 NOTE — PATIENT INSTRUCTIONS
Vaccine Information Statement    Influenza (Flu) Vaccine (Inactivated or Recombinant): What You Need to Know    Many vaccine information statements are available in [place] and other languages. See www.immunize.org/vis. Hojas de información sobre vacunas están disponibles en español y en muchos otros idiomas. Visite www.immunize.org/vis. 1. Why get vaccinated? Influenza vaccine can prevent influenza (flu). Flu is a contagious disease that spreads around the [facility] every year, usually between October and May. Anyone can get the flu, but it is more dangerous for some people. Infants and young children, people 72 years and older, pregnant people, and people with certain health conditions or a weakened immune system are at greatest risk of flu complications. Pneumonia, bronchitis, sinus infections, and ear infections are examples of flu-related complications. If you have a medical condition, such as heart disease, cancer, or diabetes, flu can make it worse. Flu can cause fever and chills, sore throat, muscle aches, fatigue, cough, headache, and runny or stuffy nose. Some people may have vomiting and diarrhea, though this is more common in children than adults. In an average year, thousands of people in the [facility] die from flu, and many more are hospitalized. Flu vaccine prevents millions of illnesses and flu-related visits to the doctor each year. 2. Influenza vaccines     CDC recommends everyone 6 months and older get vaccinated every flu season. Children 6 months through 6years of age may need 2 doses during a single flu season. Everyone else needs only 1 dose each flu season. It takes about 2 weeks for protection to develop after vaccination. There are many flu viruses, and they are always changing. Each year a new flu vaccine is made to protect against the influenza viruses believed to be likely to cause disease in the upcoming flu season.  Even when the vaccine doesnt exactly match these viruses, it may still provide some protection. Influenza vaccine does not cause flu. Influenza vaccine may be given at the same time as other vaccines. 3. Talk with your health care provider    Tell your vaccination provider if the person getting the vaccine:  Has had an allergic reaction after a previous dose of influenza vaccine, or has any severe, life-threatening allergies   Has ever had Guillain-Barré Syndrome (also called GBS)    In some cases, your health care provider may decide to postpone influenza vaccination until a future visit. Influenza vaccine can be administered at any time during pregnancy. People who are or will be pregnant during influenza season should receive inactivated influenza vaccine. People with minor illnesses, such as a cold, may be vaccinated. People who are moderately or severely ill should usually wait until they recover before getting influenza vaccine. Your health care provider can give you more information. 4. Risks of a vaccine reaction    Soreness, redness, and swelling where the shot is given, fever, muscle aches, and headache can happen after influenza vaccination. There may be a very small increased risk of Guillain-Barré Syndrome (GBS) after inactivated influenza vaccine (the flu shot). [facility] children who get the flu shot along with pneumococcal vaccine (PCV13) and/or DTaP vaccine at the same time might be slightly more likely to have a seizure caused by fever. Tell your health care provider if a child who is getting flu vaccine has ever had a seizure. People sometimes faint after medical procedures, including vaccination. Tell your provider if you feel dizzy or have vision changes or ringing in the ears. As with any medicine, there is a very remote chance of a vaccine causing a severe allergic reaction, other serious injury, or death. 5. What if there is a serious problem?     An allergic reaction could occur after the vaccinated person leaves the clinic. If you see signs of a severe allergic reaction (hives, swelling of the face and throat, difficulty breathing, a fast heartbeat, dizziness, or weakness), call 9-1-1 and get the person to the nearest hospital.    For other signs that concern you, call your health care provider. Adverse reactions should be reported to the Vaccine Adverse Event Reporting System (VAERS). Your health care provider will usually file this report, or you can do it yourself. Visit the VAERS website at www.vaers. [facility].gov or call 2-791.226.3790. VAERS is only for reporting reactions, and VAERS staff members do not give medical advice. 6. The National Vaccine Injury Compensation Program    The [facility] Vaccine Injury Compensation Program (VICP) is a federal program that was created to compensate people who may have been injured by certain vaccines. Claims regarding alleged injury or death due to vaccination have a time limit for filing, which may be as short as two years. Visit the VICP website at www.[facility].gov/vaccinecompensation or call 1-726.127.5265 to learn about the program and about filing a claim. 7. How can I learn more? Ask your health care provider. Call your local or state health department. Visit the website of the Food and Drug Administration (FDA) for vaccine package inserts and additional information at www.fda.gov/vaccines-blood-biologics/vaccines. Contact the Centers for Disease Control and Prevention (CDC): Call 8-271.155.1636 (1-800-CDC-INFO) or  Visit CDCs influenza website at www.cdc.gov/flu. Vaccine Information Statement   Inactivated Influenza Vaccine   8/6/2021  42  [id number]   Department of Health and Human Services  Centers for Disease Control and Prevention    Office Use Only

## 2022-10-25 ENCOUNTER — TELEPHONE (OUTPATIENT)
Dept: INTERNAL MEDICINE CLINIC | Age: 37
End: 2022-10-25

## 2022-10-25 DIAGNOSIS — I10 PRIMARY HYPERTENSION: Primary | ICD-10-CM

## 2022-10-25 RX ORDER — AMLODIPINE BESYLATE 5 MG/1
5 TABLET ORAL DAILY
Qty: 30 TABLET | Refills: 0 | Status: SHIPPED | OUTPATIENT
Start: 2022-10-25

## 2022-10-25 NOTE — TELEPHONE ENCOUNTER
Instruct pt to dc triamterene. Sent amlodipine 5 mg to her pharmacy. She is to take this 1 time per day in the morning. Have her check her blood pressure 2 hours after taking medication and if her blood pressure is above 139/89 she may take a second amlodipine 5 mg tablet. Thank you        ICD-10-CM ICD-9-CM    1.  Primary hypertension  I10 401.9 amLODIPine (NORVASC) 5 mg tablet

## 2022-10-25 NOTE — TELEPHONE ENCOUNTER
Pt calling re new BP medication triamterene 50 mg capsule. Stated was having heart burn and stomach cramps. She stopped the medication on Friday and the symptoms went away. She resumed the medication yesterday and the symptoms returned. She has stopped the medication again and will await your advise. Stated her BP is doing good, \"the head aches went away.     Please advise

## 2022-10-26 ENCOUNTER — PATIENT MESSAGE (OUTPATIENT)
Dept: INTERNAL MEDICINE CLINIC | Age: 37
End: 2022-10-26

## 2022-10-26 NOTE — TELEPHONE ENCOUNTER
Pt reached and made aware of message per Dr. Regis Barthel. Pt verbalized understanding.  [employer] message was also sent to pt per her request.

## 2022-10-27 ENCOUNTER — HOSPITAL ENCOUNTER (OUTPATIENT)
Dept: BARIATRICS/WEIGHT MGMT | Age: 37
Discharge: HOME | End: 2022-10-27

## 2022-10-27 VITALS — WEIGHT: 293 LBS | HEIGHT: 65 IN | BODY MASS INDEX: 48.82 KG/M2

## 2022-10-27 NOTE — PROGRESS NOTES
Medical Weight Loss Multi-Disciplinary Program    Patient's Name: Sharee Zhu Age: 30 y.o. YOB: 1985 Sex: female     Session #2, pt previously attended class on 3/2/22, now RESTART of nutrition education. Pt attended in-person class. Weight obtained in office. Date: 10/27/2022    Visit Vitals  Ht 5' 5\" (1.651 m)   Wt 133.4 kg (294 lb 1.6 oz)   BMI 48.94 kg/m²       Pounds Lost since last visit (3/2/2022): 0.0 lbs Pounds Gained since last visit (3/2/2022): 14.5 lbs       Starting Weight : 277.5 lbs Previous Visit Weight: 279.6 lbs   Overall Pounds Lost: 0.0 lbs  Overall Pounds Gained: 16.6 lbs     Note that pt has a pre-operative weight loss goal of 15 lbs. Pt has not met this goal.     Do you smoke? no    Alcohol intake:  Number of drinks per week: \"socially\"    Class Guidelines    Guidelines are reviewed with patient at the start of every class. 1. Patient understands that weight loss trial classes must be consecutive. Patient understands if they miss a class, it is their responsibility to contact me to reschedule class. I will reach out to patient after their first no show. 2.  Patient understands the expectations that weight maintenance/weight loss is expected during the classes. Failure to demonstrate changes may result in extension of weight loss trial, followed by returning to see the surgeon. Patient understands that they CANNOT gain any weight during the weight loss trial.  Gaining weight will result in extension of weight loss trial.  3. Patient is also instructed to complete their labwork, psychological evaluation visit, and any other tests that the surgeon has ordered while they are working on their weight loss trial.  4.  Patient was instructed to bring their packet of nutrition education materials to every class and appointment.         Eating Habits and Behaviors    Reviewed intake  Understanding low-carbohydrate/low-sugar/low-fat, higher protein meals  Instruction given for personal dietary changes  Discussed perceived compliance    Comments: RD Reviewed Diet History and Physical Activity/Exercise habits. Recommended dietary changes discussed for both before and after surgery. Today in class we reviewed the Key Diet Principles. Patient was encouraged to consume 3 meals each day, and meal timing was reviewed. Meal time behaviors that will help pt to be successful with their weight loss efforts were also discussed. We discussed the importance of drinking adequate amounts of fluids, recommending that patient consume a minimum of 64 oz of sugar-free, caffeine-free and carbonation-free fluids each day. Patient was encouraged to eliminate sugar-sweetened beverages such as sweet tea, fruit juice, fruit smoothies, flavored coffee drinks and regular sodas. During the weight loss trial, patient is encouraged to focus on eating protein-forward meals, with a daily goal of  grams protein. Patient was also advised to decrease carbohydrate intake to <100 g/d, choosing complex vs. simple carbohydrates in limited amounts. We also discussed limiting fat intake. Encouraged patient to follow the \"3-gram rule\" when choosing foods by looking for items containing <3 g of fat and sugar per serving. We reviewed meal planning guidelines and discussed appropriate meal and snack options. We also talked about appropriate protein drinks and patient was encouraged to start trying these, using them either for a meal replacement or a protein-rich snack pre-operatively. We reviewed the nutritional guidelines for selecting protein shakes. Pre-operative vitamin and mineral supplementation was reviewed. Patient was instructed to choose a chewable complete multivitamin with iron in NON-gummy form. Selection of probiotic was also reviewed. We also talked about dining out after bariatric surgery. Patient was instructed on:   Following the Key Diet Principles to stay within the bariatric dietary guidelines  Key words to look for in menu item descriptions in order to make healthier choices  Requesting specific substitutions to allow meals to fit in with bariatric dietary guidelines  Using the restaurant card to request smaller portions of menu items or ordering from children's/senior's menu    Patient's current diet habits include:  Patient is eating 1 meals and 2 snacks per day. Per dietary recall, pts diet has the following concerns:  Pt is only consuming 24 oz water/day. Pt is skipping meals. Pt is consuming sugar sweetened foods 3 d/wk. Pt is consuming 35-40 oz sugar-sweetened beverages/day. Pt is consuming foods high in carbohydrates, such as: candy, peanut butter crackers, chips, fast food, and 20 oz soda/serving. Pt is consuming foods high in fat such as: fast foods, peanut butter crackers, and chips. Pt has found 0 protein supplement shakes for use post-op in meeting their protein needs. Patient's plan for dietary and/or behavior changes in the upcoming month: \"No more eating out & cook more\". Physical Activity/Exercise    Comments: We talked about exercise. Patient was given reasons of why exercise is so important and how that can help with their long-term success. I have encouraged patient to get a support system to help with the activity. We talked about recommended types of physical activity, including walking, swimming, cycling, or chair exercises. I also talked with patient about doing some strength training, which helps the metabolism, as well as strengthen muscles and bones. Patient's plan to incorporate more activity includes: \"start walking\". Behavior Modification     Comments:  During today's class, we discussed important behavior changes relating to dining away from home that will help the patient be successful in meeting their weight loss goals and improving their health including:   The need to limit the frequency of dining away from home, especially fast food and convenience food options, in order to limit intake of calories, fats, and carbohydrates  The importance of keeping a positive mindset by focusing on the occasion and enjoying the company rather than the foods they can't have and the need for reduced portion sizes after surgery  Avoiding starters such as appetizers, breads, bar snacks, soups, and salads  Researching to find restaurants that offer healthier food options and reviewing menus/nutrition facts online to have a plan of what to order before dining out    Goals: Work to increase to 3-4 small meals per day, with 1-2 planned snacks as needed. Recommend following the plate method for meal planning - focusing on lean protein, non-starchy vegetables, and measured amounts of complex carbohydrates. - Goal of  g protein and <100 g carbohydrates per day. - Select at least 2 DIFFERENT protein supplements that can be used for protein supplementation to meet goals pre- and post-operatively. - Practice Carbohydrate Counting and label reading.   - Follow 3 g rule for fat and sugar. 2. Slow down meals  - Chew each bite 25-35 times. - Aim for 20-30 min/meal.  3. Increase non-caloric fluids to 64 oz per day. Eliminate caffeine, added sugar, carbonation, and straws.               - Continue to work to decrease sugar sweetened beverages - goal of calorie-free beverages only. - Must eliminate caffeine prior to surgery and avoid for ~6-8 weeks. - Practice 30:30 rule,  food and fluid intake. 4. Start activity regimen, work to increase ADLs. 5. Start Complete MVI and probiotic at least 30 days pre-op. Candidate for surgery (per RD): PENDING, Pt scheduled for nutrition education on 11/22/2022.

## 2022-11-10 ENCOUNTER — HOSPITAL ENCOUNTER (OUTPATIENT)
Dept: BARIATRICS/WEIGHT MGMT | Age: 37
Discharge: HOME | End: 2022-11-10

## 2022-11-10 VITALS — WEIGHT: 288.2 LBS | BODY MASS INDEX: 48.02 KG/M2 | HEIGHT: 65 IN

## 2022-11-10 NOTE — PROGRESS NOTES
Medical Weight Loss Multi-Disciplinary Program    Patient's Name: Miguel Ferrera Age: 30 y.o. YOB: 1985 Sex: female     Session #2. Pt attended in-person class. Weight obtained in office. Date: 11/10/2022    Visit Vitals   5' 5\" (1.651 m)   Wt 130.7 kg (288 lb 3.2 oz)   BMI 47.96 kg/m²       Pounds Lost since last month: 5.9 lbs Pounds Gained since last month: 0 lbs       Starting Weight: 277.5 lbs Previous Months Weight: 294.1 lbs   Overall Pounds Lost: 0 lbs  Overall Pounds Gained: 10.7 lbs     Note that pt has a pre-operative weight loss goal of 15 lbs. Pt has not met this goal.     Do you smoke? No    Alcohol intake:  Number of drinks per week:  \"socially\"    Class Guidelines    Guidelines are reviewed with patient at the start of every class. 1. Patient understands that weight loss trial classes must be consecutive. Patient understands if they miss a class, it is their responsibility to contact me to reschedule class. I will reach out to patient after their first no show. 2.  Patient understands the expectations that weight maintenance/weight loss is expected during the classes. Failure to demonstrate changes may result in extension of weight loss trial, followed by returning to see the surgeon. Patient understands that they CANNOT gain any weight during the weight loss trial.  Gaining weight will result in extension of weight loss trial.  3. Patient is also instructed to complete their labwork, psychological evaluation visit, and any other tests that the surgeon has ordered while they are working on their weight loss trial.  4.  Patient was instructed to bring their packet of nutrition education materials to every class and appointment.         Eating Habits and Behaviors    Reviewed intake  Understanding low carbohydrates, low sugar, higher protein meals  Instruction given for personal dietary changes  Discussed perceived compliance    Comments: RD Reviewed Diet History and Physical Activity/Exercise habits. Recommended dietary changes discussed for both before and after surgery. Today in class we reviewed the Key Diet Principles. Patient was encouraged to consume 3 meals each day, and meal timing was reviewed. Meal time behaviors that will help pt to be successful with their weight loss efforts were also discussed. We discussed the importance of drinking adequate amounts of fluids, recommending that patient consume a minimum of 64 oz of sugar-free, caffeine-free and carbonation-free fluids each day. Patient was encouraged to eliminate sugar-sweetened beverages such as sweet tea, fruit juice, fruit smoothies, flavored coffee drinks and regular sodas. During the weight loss trial, patient is encouraged to focus on eating protein-forward meals, with a daily goal of  grams protein. Patient was also advised to decrease carbohydrate intake to <100 g/d, choosing complex vs. simple carbohydrates in limited amounts. We also discussed limiting fat intake. Encouraged patient to follow the \"3-gram rule\" when choosing foods by looking for items containing <3 g of fat and sugar per serving. We reviewed meal planning guidelines and discussed appropriate meal and snack options. We also talked about appropriate protein drinks and patient was encouraged to start trying these, using them either for a meal replacement or a protein-rich snack pre-operatively. We reviewed the nutritional guidelines for selecting protein shakes. Pre-operative vitamin and mineral supplementation was reviewed. Patient was instructed to choose a chewable complete multivitamin with iron in NON-gummy form. Selection of probiotic was also reviewed. Comments: During today's class we talked about the role of carbohydrates. Patient was instructed on: The function of carbohydrates. Foods that are carbohydrate-heavy.   Patient was given the guidelines to keep their carbohydrates less than 50-75 grams per day in the pre-op phase. Patient was also given ideas of low carb swaps, which include zucchini noodles, spaghetti squash, or cauliflower rice. Discussed lower carb swaps to use instead of potato chips. Patient's current diet habits include:  Patient is eating 3 meals and 1 snacks per day. Per dietary recall, pts diet has the following concerns:  None noted   Pt has found 2 protein supplement shakes for use post-op in meeting their protein needs. Patient's plan for dietary and/or behavior changes in the upcoming month: \"getting to that 64 oz of water. \"    Physical Activity/Exercise    Comments: We talked about exercise. Patient was given reasons of why exercise is so important and how that can help with their long-term success. I have encouraged patient to get a support system to help with the activity. We talked about recommended types of physical activity, including walking, swimming, cycling, or chair exercises. I also talked with patient about doing some strength training, which helps the metabolism, as well as strengthen muscles and bones. Patient's plan to incorporate more activity includes: \"start going to the gym\". Behavior Modification     Comments:  During today's class, I discussed vitamin and mineral supplementation essential for health and prevention of malnutrition in depth. Patient was directed to the handouts on vitamins and minerals found on pages 27-33 that were provided in class handouts packet as well as a handout titled, \"Nutrient Deficiency and it's symptoms\". I reviewed the vitamins and minerals that need to be supplemented, dosage recommendations, functions of supplements and signs of deficiencies, as well as examples of specific supplements.   Reviewed briefly the requirement  differences between laparoscopic gastric bypass, laparoscopic sleeve gastrectomy, and lap-band procedures, while encouraging all patients to continue supplements for life no matter what bariatric surgery they are preparing for. Goals: Work to increase to 3-4 small meals per day, with 1-2 planned snacks as needed. Recommend following the plate method for meal planning - focusing on lean protein, non-starchy vegetables, and measured amounts of complex carbohydrates. - Goal of  g protein and <100 g carbohydrates per day. - Select at least 2 DIFFERENT protein supplements that can be used for protein supplementation to meet goals pre- and post-operatively. - Practice Carbohydrate Counting and label reading.   - Follow 3 g rule for fat and sugar. 2. Slow down meals  - Chew each bite 25-35 times. - Aim for 20-30 min/meal.  3. Increase non-caloric fluids to 64 oz per day. Eliminate caffeine, added sugar, carbonation, and straws.               - Continue to work to decrease sugar sweetened beverages - goal of calorie-free beverages only. - Must eliminate caffeine prior to surgery and avoid for ~6-8 weeks. - Practice 30:30 rule,  food and fluid intake. 4. Start activity regimen, work to increase ADLs. 5. Start Complete MVI and probiotic at least 30 days pre-op. Candidate for surgery (per RD): PENDING, Pt scheduled for nutrition education on 12/1/22.

## 2022-11-16 ENCOUNTER — OFFICE VISIT (OUTPATIENT)
Dept: SURGERY | Age: 37
End: 2022-11-16
Payer: COMMERCIAL

## 2022-11-16 VITALS
BODY MASS INDEX: 47.15 KG/M2 | OXYGEN SATURATION: 100 % | HEIGHT: 65 IN | TEMPERATURE: 96.9 F | HEART RATE: 89 BPM | SYSTOLIC BLOOD PRESSURE: 139 MMHG | WEIGHT: 283 LBS | DIASTOLIC BLOOD PRESSURE: 72 MMHG

## 2022-11-16 DIAGNOSIS — K21.9 GASTROESOPHAGEAL REFLUX DISEASE, UNSPECIFIED WHETHER ESOPHAGITIS PRESENT: ICD-10-CM

## 2022-11-16 DIAGNOSIS — I10 PRIMARY HYPERTENSION: Primary | ICD-10-CM

## 2022-11-16 DIAGNOSIS — E66.01 MORBID (SEVERE) OBESITY DUE TO EXCESS CALORIES (HCC): ICD-10-CM

## 2022-11-16 DIAGNOSIS — E66.01 MORBID OBESITY WITH BMI OF 45.0-49.9, ADULT (HCC): ICD-10-CM

## 2022-11-16 DIAGNOSIS — E78.5 DYSLIPIDEMIA: ICD-10-CM

## 2022-11-16 PROCEDURE — 3074F SYST BP LT 130 MM HG: CPT | Performed by: SPECIALIST

## 2022-11-16 PROCEDURE — 99214 OFFICE O/P EST MOD 30 MIN: CPT | Performed by: SPECIALIST

## 2022-11-16 PROCEDURE — 3078F DIAST BP <80 MM HG: CPT | Performed by: SPECIALIST

## 2022-11-16 NOTE — PATIENT INSTRUCTIONS

## 2022-11-16 NOTE — PROGRESS NOTES
Bariatric Surgery Preoperative Progress Note    Subjective:     Bárbara Camilo is a 30 y.o. obese female with a Body mass index is 47.09 kg/m². Giovana Fernandez she desires surgery at this time because of health issues and quality of life issues. Bárbara Camilo has been seen by a bariatric nutritionist and has been placed on an appropriate low carbohydrate diet. The patient desires laparoscopic gastric bypass surgery for surgical weight loss, however she is here today to review their workup to date. Bárbara Camilo is here also today to check progress with weight loss / evaluate nutritional status and review all subspecialty clearances in hopes of proceeding to the operating room. Patient Active Problem List    Diagnosis Date Noted    Hypokalemia 10/07/2022    Class 3 severe obesity due to excess calories with serious comorbidity and body mass index (BMI) of 45.0 to 49.9 in adult [facility]) 10/07/2022    Primary hypertension 09/29/2022    Vitamin D deficiency 04/25/2022    Dyslipidemia 04/25/2022    GERD (gastroesophageal reflux disease)       Past Surgical History:   Procedure Laterality Date    FLEXIBLE SIGMOIDOSCOPY N/A 3/12/2021    SIGMOIDOSCOPY FLEXIBLE with bxs performed by Dano Phillips MD at [facility] ENDOSCOPY    HX HEMORRHOIDECTOMY        Social History     Tobacco Use    Smoking status: Never    Smokeless tobacco: Never   Substance Use Topics    Alcohol use: Yes     Comment: occasional      Family History   Problem Relation Age of Onset    Hypertension Mother     Diabetes Brother     Cancer Neg Hx     Heart Disease Neg Hx     Stroke Neg Hx       Current Outpatient Medications   Medication Sig Dispense Refill    amLODIPine (NORVASC) 5 mg tablet Take 1 Tablet by mouth daily. Indications: high blood pressure 30 Tablet 0    ergocalciferol (ERGOCALCIFEROL) 1,250 mcg (50,000 unit) capsule Take 1 Capsule by mouth every seven (7) days.  13 Capsule 3    triamterene (DYRENIUM) 50 mg capsule Take 1 Capsule by mouth two (2) times a day.  180 Capsule 0     No Known Allergies       Review of Systems:            General - No history or complaints of unexpected fever, chills, or weight loss  Head/Neck - No history or complaints of headache, diplopia, dysphagia, hearing loss  Cardiac - No history or complaints of chest pain, palpitations, murmur, or shortness of breath  Pulmonary - No history or complaints of shortness of breath, productive cough, hemoptysis  Gastrointestinal - No history or complaints of reflux,  abdominal pain, obstipation/constipation, blood per rectum  Genitourinary - No history or complaints of hematuria/dysuria, stress urinary incontinence symptoms, or renal lithiasis  Musculoskeletal - No history or complaints of joint pain or muscular weakness  Hematologic - No history or complaints of bleeding disorders, blood transfusions, sickle cell anemia  Neurologic - No history or complaints of  migraine headaches, seizure activity, syncopal episodes, TIA or stroke  Integumentary - No history or complaints of rashes, abnormal nevi, skin cancer  Gynecological - normal menses           Objective:   Visit Vitals  /72 (BP 1 Location: Left upper arm, BP Patient Position: Sitting, BP Cuff Size: Large adult)   Pulse 89   Temp 96.9 °F (36.1 °C)   Ht 5' 5\" (1.651 m)   Wt 128.4 kg (283 lb)   SpO2 100%   BMI 47.09 kg/m²       Physical Examination: General appearance - alert, well appearing, and in no distress and oriented to person, place, and time  Mental status - alert, oriented to person, place, and time, normal mood, behavior, speech, dress, motor activity, and thought processes  Eyes - pupils equal and reactive, extraocular eye movements intact, sclera anicteric, left eye normal, right eye normal  Ears - right ear normal, left ear normal  Nose - normal and patent, no erythema, discharge or polyps  Mouth - mucous membranes moist, pharynx normal without lesions  Neck - supple, no significant adenopathy  Lymphatics - no palpable lymphadenopathy, no hepatosplenomegaly  Chest - clear to auscultation, no wheezes, rales or rhonchi, symmetric air entry  Heart - normal rate, regular rhythm, normal S1, S2, no murmurs, rubs, clicks or gallops  Abdomen - soft, nontender, nondistended, no masses or organomegaly  Back exam - full range of motion, no tenderness, palpable spasm or pain on motion  Neurological - alert, oriented, normal speech, no focal findings or movement disorder noted  Musculoskeletal - no joint tenderness, deformity or swelling  Extremities - peripheral pulses normal, no pedal edema, no clubbing or cyanosis    Labs:     Recent Results (from the past 2016 hour(s))   METABOLIC PANEL, COMPREHENSIVE    Collection Time: 10/05/22  1:20 PM   Result Value Ref Range    Sodium 139 136 - 145 mmol/L    Potassium 3.1 (L) 3.5 - 5.5 mmol/L    Chloride 102 100 - 111 mmol/L    CO2 28 21 - 32 mmol/L    Anion gap 9 3.0 - 18 mmol/L    Glucose 102 (H) 74 - 99 mg/dL    BUN 13 7.0 - 18 MG/DL    Creatinine 0.61 0.6 - 1.3 MG/DL    BUN/Creatinine ratio 21 (H) 12 - 20      eGFR >60 >60 ml/min/1.73m2    Calcium 9.4 8.5 - 10.1 MG/DL    Bilirubin, total 0.8 0.2 - 1.0 MG/DL    ALT (SGPT) 25 13 - 56 U/L    AST (SGOT) 13 10 - 38 U/L    Alk. phosphatase 81 45 - 117 U/L    Protein, total 7.7 6.4 - 8.2 g/dL    Albumin 3.5 3.4 - 5.0 g/dL    Globulin 4.2 (H) 2.0 - 4.0 g/dL    A-G Ratio 0.8 0.8 - 1.7             Assessment:     Morbid obesity with associated comorbidity     Plan:     Continuation of Pre-Operative evaluation / clearance. Marcela Jeter has returned to the office today to discuss her status as a surgical candidate.    her progress has been noted and reviewed. We will continue the pre-operative process and work towards goals as outlined. she has 0 more pounds to lose before proceeding to the OR.   (11 pounds lost since last visit)  she has 1 more nutritional visits to complete before proceeding to the OR  she has no clearance to review before proceeding to the OR. Liliane Suarez understand the rationales for all the above. It has been discussed that given her   condition   that the best surgical option for this patient would be the laparoscopic gastric bypass surgery. Liliane Suarez   agrees with the surgical choice and has been educated in it's; risks, benefits, and alternatives. We will continue   with the pre-operative evaluation as needed to check progress.     Secondary Diagnoses:         Signed By: Jazmine Campos MD     November 16, 2022

## 2022-11-21 NOTE — ED NOTES
Addended by: JASON DE ANDA on: 11/21/2022 12:37 PM     Modules accepted: Orders     Pt is currently in xr. ..

## 2022-12-08 ENCOUNTER — HOSPITAL ENCOUNTER (OUTPATIENT)
Dept: BARIATRICS/WEIGHT MGMT | Age: 37
Discharge: HOME | End: 2022-12-08

## 2022-12-16 DIAGNOSIS — I10 PRIMARY HYPERTENSION: ICD-10-CM

## 2022-12-18 RX ORDER — AMLODIPINE BESYLATE 5 MG/1
TABLET ORAL
Qty: 30 TABLET | Refills: 0 | Status: SHIPPED | OUTPATIENT
Start: 2022-12-18

## 2023-01-06 ENCOUNTER — OFFICE VISIT (OUTPATIENT)
Dept: INTERNAL MEDICINE CLINIC | Age: 38
End: 2023-01-06
Payer: COMMERCIAL

## 2023-01-06 ENCOUNTER — APPOINTMENT (OUTPATIENT)
Dept: INTERNAL MEDICINE CLINIC | Age: 38
End: 2023-01-06

## 2023-01-06 ENCOUNTER — HOSPITAL ENCOUNTER (OUTPATIENT)
Dept: LAB | Age: 38
End: 2023-01-06
Payer: COMMERCIAL

## 2023-01-06 VITALS
WEIGHT: 289 LBS | HEIGHT: 65 IN | DIASTOLIC BLOOD PRESSURE: 84 MMHG | TEMPERATURE: 97.3 F | OXYGEN SATURATION: 99 % | RESPIRATION RATE: 18 BRPM | SYSTOLIC BLOOD PRESSURE: 133 MMHG | BODY MASS INDEX: 48.15 KG/M2 | HEART RATE: 78 BPM

## 2023-01-06 DIAGNOSIS — E66.01 CLASS 3 SEVERE OBESITY DUE TO EXCESS CALORIES WITH SERIOUS COMORBIDITY AND BODY MASS INDEX (BMI) OF 45.0 TO 49.9 IN ADULT (HCC): ICD-10-CM

## 2023-01-06 DIAGNOSIS — I10 PRIMARY HYPERTENSION: ICD-10-CM

## 2023-01-06 DIAGNOSIS — E87.6 HYPOKALEMIA: ICD-10-CM

## 2023-01-06 DIAGNOSIS — I10 PRIMARY HYPERTENSION: Primary | ICD-10-CM

## 2023-01-06 LAB
ALBUMIN SERPL-MCNC: 3.4 G/DL (ref 3.4–5)
ALBUMIN/GLOB SERPL: 0.8 (ref 0.8–1.7)
ALP SERPL-CCNC: 81 U/L (ref 45–117)
ALT SERPL-CCNC: 18 U/L (ref 13–56)
ANION GAP SERPL CALC-SCNC: 6 MMOL/L (ref 3–18)
AST SERPL-CCNC: 8 U/L (ref 10–38)
BILIRUB SERPL-MCNC: 0.3 MG/DL (ref 0.2–1)
BUN SERPL-MCNC: 9 MG/DL (ref 7–18)
BUN/CREAT SERPL: 15 (ref 12–20)
CALCIUM SERPL-MCNC: 9.5 MG/DL (ref 8.5–10.1)
CHLORIDE SERPL-SCNC: 106 MMOL/L (ref 100–111)
CO2 SERPL-SCNC: 26 MMOL/L (ref 21–32)
CREAT SERPL-MCNC: 0.62 MG/DL (ref 0.6–1.3)
GLOBULIN SER CALC-MCNC: 4.3 G/DL (ref 2–4)
GLUCOSE SERPL-MCNC: 92 MG/DL (ref 74–99)
POTASSIUM SERPL-SCNC: 4.1 MMOL/L (ref 3.5–5.5)
PROT SERPL-MCNC: 7.7 G/DL (ref 6.4–8.2)
SODIUM SERPL-SCNC: 138 MMOL/L (ref 136–145)

## 2023-01-06 PROCEDURE — 36415 COLL VENOUS BLD VENIPUNCTURE: CPT

## 2023-01-06 PROCEDURE — 80053 COMPREHEN METABOLIC PANEL: CPT
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
## 2023-01-06 NOTE — PROGRESS NOTES
Chief Complaint   Patient presents with    Hypertension     3 month f/u     1. \"Have you been to the ER, urgent care clinic since your last visit? Hospitalized since your last visit? \" No    2. \"Have you seen or consulted any other health care providers outside of the [address] [place] [address] since your last visit? \"  Yes, nutritionist      3. For patients aged 39-70: Has the patient had a colonoscopy / FIT/ Cologuard? NA - based on age      If the patient is female:    4. For patients aged 41-77: Has the patient had a mammogram within the past 2 years? NA - based on age or sex      11. For patients aged 21-65: Has the patient had a pap smear? Yes - Care Gap present.  Most recent result on file

## 2023-01-09 ENCOUNTER — TELEPHONE (OUTPATIENT)
Dept: INTERNAL MEDICINE CLINIC | Age: 38
End: 2023-01-09

## 2023-01-09 NOTE — TELEPHONE ENCOUNTER
----- Message from Deborah Khan DNP sent at 1/9/2023  7:56 AM EST -----  [facility] nurses, pls contact patient with the following:    CHELSEA good    Thank you

## 2023-01-09 NOTE — PROGRESS NOTES
[id number] [facility] nurses, pls contact patient with the following:    CMP good    Thank you

## 2023-02-09 ENCOUNTER — HOSPITAL ENCOUNTER (OUTPATIENT)
Dept: BARIATRICS/WEIGHT MGMT | Age: 38
Discharge: HOME | End: 2023-02-09

## 2023-02-09 VITALS — HEIGHT: 65 IN | BODY MASS INDEX: 47.1 KG/M2 | WEIGHT: 282.7 LBS

## 2023-02-09 NOTE — PROGRESS NOTES
Medical Weight Loss Multi-Disciplinary Program    Patient's Name: Debra Gan Age: 30 y.o. YOB: 1985 Sex: female     Session #4. Pt attended in-person class. Weight obtained in office. Date: 2/9/2023    Visit Vitals  Yane 5' 5\" (1.651 m)   Wt 128.2 kg (282 lb 11.2 oz)   BMI 47.04 kg/m²       Pounds Lost since last month: 2.6 lbs Pounds Gained since last month: 0 lbs       Starting Weight: 277.5 lbs Last Visit Weight: 285.3 lbs   Overall Pounds Lost: 0 lbs  Overall Pounds Gained: 5.2 lbs     Do you smoke? no    Alcohol intake:  Number of drinks per week:  2-3/month    Class Guidelines    Guidelines are reviewed with patient at the start of every class. 1. Patient understands that weight loss trial classes must be consecutive. Patient understands if they miss a class, it is their responsibility to contact me to reschedule class. I will reach out to patient after their first no show. 2.  Patient understands the expectations that weight maintenance/weight loss is expected during the classes. Failure to demonstrate changes may result in extension of weight loss trial, followed by returning to see the surgeon. Patient understands that they CANNOT gain any weight during the weight loss trial.  Gaining weight will result in extension of weight loss trial.  3. Patient is also instructed to complete their labwork, psychological evaluation visit, and any other tests that the surgeon has ordered while they are working on their weight loss trial.  4.  Patient was instructed to bring their packet of nutrition education materials to every class and appointment. Eating Habits and Behaviors    Reviewed intake  Understanding low carbohydrates, low sugar, higher protein meals  Instruction given for personal dietary changes  Discussed perceived compliance    Comments: RD Reviewed Diet History and Physical Activity/Exercise habits.   Recommended dietary changes discussed for both before and after surgery. Today in class we reviewed the Key Diet Principles. Patient was encouraged to consume 3 meals each day, and meal timing was reviewed. Meal time behaviors that will help pt to be successful with their weight loss efforts were also discussed. We discussed the importance of drinking adequate amounts of fluids, recommending that patient consume a minimum of 64 oz of sugar-free, caffeine-free and carbonation-free fluids each day. Patient was encouraged to eliminate sugar-sweetened beverages such as sweet tea, fruit juice, fruit smoothies, flavored coffee drinks and regular sodas. During the weight loss trial, patient is encouraged to focus on eating protein-forward meals, with a daily goal of  grams protein. Patient was also advised to decrease carbohydrate intake to <100 g/d, choosing complex vs. simple carbohydrates in limited amounts. We also discussed limiting fat intake. Encouraged patient to follow the \"3-gram rule\" when choosing foods by looking for items containing <3 g of fat and sugar per serving. We reviewed meal planning guidelines and discussed appropriate meal and snack options. We also talked about appropriate protein drinks and patient was encouraged to start trying these, using them either for a meal replacement or a protein-rich snack pre-operatively. We reviewed the nutritional guidelines for selecting protein shakes. Pre-operative vitamin and mineral supplementation was reviewed. Patient was instructed to choose a chewable complete multivitamin with iron in NON-gummy form. Selection of probiotic was also reviewed. Comments: During today's class we talked about setting SMART goals. Patient was instructed on: The Stages of Readiness to Change  Goals for each stage of readiness to change. Key words associated with each stage of readiness to change.   Examples of aspects of pts diet to focus on and how to tailor the patients goals in these areas. Patient was given the guidelines of how to set SMART goals. Pt was given examples of barriers to change and ways to persist in the face of barriers. Patient's current diet habits include:  Patient is eating 2 meals and 1 snacks per day. Per dietary recall, pts diet has the following concerns: none noted  Pt has found 3 protein supplement shakes for use post-op in meeting their protein needs. Patient's plan for dietary and/or behavior changes in the upcoming month: none noted    Physical Activity/Exercise    Comments: We talked about exercise. Patient was given reasons of why exercise is so important and how that can help with their long-term success. I have encouraged patient to get a support system to help with the activity. We talked about recommended types of physical activity, including walking, swimming, cycling, or chair exercises. I also talked with patient about doing some strength training, which helps the metabolism, as well as strengthen muscles and bones. Patient's plan to incorporate more activity includes: none noted      Behavior Modification     Comments:  During today's class, I discussed vitamin and mineral supplementation essential for health and prevention of malnutrition in depth. Patient was directed to the handouts on vitamins and minerals found on pages 27-33 that were provided in class handouts packet as well as a handout titled, \"Nutrient Deficiency and it's symptoms\". I reviewed the vitamins and minerals that need to be supplemented, dosage recommendations, functions of supplements and signs of deficiencies, as well as examples of specific supplements. Reviewed briefly the requirement  differences between laparoscopic gastric bypass, laparoscopic sleeve gastrectomy, and lap-band procedures, while encouraging all patients to continue supplements for life no matter what bariatric surgery they are preparing for. Goals:    Work to increase to 3-4 small meals per day, with 1-2 planned snacks as needed. Recommend following the plate method for meal planning - focusing on lean protein, non-starchy vegetables, and measured amounts of complex carbohydrates. - Goal of  g protein and <100 g carbohydrates per day. - Select at least 2 DIFFERENT protein supplements that can be used for protein supplementation to meet goals pre- and post-operatively. - Practice Carbohydrate Counting and label reading.   - Follow 3 g rule for fat and sugar. 2. Slow down meals  - Chew each bite 25-35 times. - Aim for 20-30 min/meal.  3. Increase non-caloric fluids to 64 oz per day. Eliminate caffeine, added sugar, carbonation, and straws.               - Continue to work to decrease sugar sweetened beverages - goal of calorie-free beverages only. - Must eliminate caffeine prior to surgery and avoid for ~6-8 weeks. - Practice 30:30 rule,  food and fluid intake. 4. Start activity regimen, work to increase ADLs. 5. Start Complete MVI and probiotic at least 30 days pre-op. Candidate for surgery (per RD): PENDING, Pt states that they will continue to work towards meeting pre-operative weight loss goal of 273 lbs.   Pt will contact the clinic to schedule their final appt when they are following the expected guidelines and have met this weight loss goal.

## 2023-02-27 ENCOUNTER — HOSPITAL ENCOUNTER (EMERGENCY)
Facility: HOSPITAL | Age: 38
Discharge: HOME | End: 2023-02-28
Attending: EMERGENCY MEDICINE
Payer: COMMERCIAL

## 2023-02-27 VITALS
BODY MASS INDEX: 46.65 KG/M2 | WEIGHT: 280 LBS | HEIGHT: 65 IN | OXYGEN SATURATION: 100 % | HEART RATE: 86 BPM | DIASTOLIC BLOOD PRESSURE: 82 MMHG | TEMPERATURE: 98 F | RESPIRATION RATE: 18 BRPM | SYSTOLIC BLOOD PRESSURE: 126 MMHG

## 2023-02-27 DIAGNOSIS — J06.9 UPPER RESPIRATORY TRACT INFECTION, UNSPECIFIED TYPE: Primary | ICD-10-CM

## 2023-02-27 DIAGNOSIS — N39.0 URINARY TRACT INFECTION WITHOUT HEMATURIA, SITE UNSPECIFIED: ICD-10-CM

## 2023-02-27 LAB
FLUAV AG NPH QL IA: NEGATIVE
FLUBV AG NOSE QL IA: NEGATIVE
SARS-COV-2 RDRP RESP QL NAA+PROBE: NOT DETECTED
SOURCE: NORMAL

## 2023-02-27 PROCEDURE — 87804 INFLUENZA ASSAY W/OPTIC: CPT

## 2023-02-27 PROCEDURE — 87635 SARS-COV-2 COVID-19 AMP PRB: CPT

## 2023-02-27 PROCEDURE — 99283 EMERGENCY DEPT VISIT LOW MDM: CPT

## 2023-02-27 ASSESSMENT — PAIN - FUNCTIONAL ASSESSMENT: PAIN_FUNCTIONAL_ASSESSMENT: 0-10

## 2023-02-27 ASSESSMENT — LIFESTYLE VARIABLES
HOW MANY STANDARD DRINKS CONTAINING ALCOHOL DO YOU HAVE ON A TYPICAL DAY: 1 OR 2
HOW OFTEN DO YOU HAVE A DRINK CONTAINING ALCOHOL: MONTHLY OR LESS

## 2023-02-27 ASSESSMENT — PAIN DESCRIPTION - LOCATION: LOCATION: GENERALIZED;HEAD

## 2023-02-27 ASSESSMENT — PAIN SCALES - GENERAL: PAINLEVEL_OUTOF10: 8

## 2023-02-27 NOTE — Clinical Note
Aung Galindo was seen and treated in our emergency department on 2/27/2023. She may return to work on 03/03/2023. If you have any questions or concerns, please don't hesitate to call.       Pelon Sotelo MD

## 2023-02-27 NOTE — Clinical Note
Yanna Son was seen and treated in our emergency department on 2/27/2023. She may return to work on 03/03/2023. If you have any questions or concerns, please don't hesitate to call.       Hector Catalan MD

## 2023-02-28 LAB
APPEARANCE UR: CLEAR
BACTERIA URNS QL MICRO: ABNORMAL /HPF
BILIRUB UR QL: NEGATIVE
COLOR UR: YELLOW
EPITH CASTS URNS QL MICRO: ABNORMAL /LPF (ref 0–5)
GLUCOSE UR STRIP.AUTO-MCNC: NEGATIVE MG/DL
HCG UR QL: NEGATIVE
HGB UR QL STRIP: NEGATIVE
KETONES UR QL STRIP.AUTO: NEGATIVE MG/DL
LEUKOCYTE ESTERASE UR QL STRIP.AUTO: ABNORMAL
NITRITE UR QL STRIP.AUTO: NEGATIVE
PH UR STRIP: 5 (ref 5–8)
PROT UR STRIP-MCNC: NEGATIVE MG/DL
RBC #/AREA URNS HPF: NEGATIVE /HPF (ref 0–5)
SP GR UR REFRACTOMETRY: 1.03 (ref 1–1.03)
UROBILINOGEN UR QL STRIP.AUTO: 0.2 EU/DL (ref 0.2–1)
WBC URNS QL MICRO: ABNORMAL /HPF (ref 0–4)

## 2023-02-28 PROCEDURE — 81001 URINALYSIS AUTO W/SCOPE: CPT

## 2023-02-28 PROCEDURE — 6370000000 HC RX 637 (ALT 250 FOR IP): Performed by: EMERGENCY MEDICINE

## 2023-02-28 PROCEDURE — 81025 URINE PREGNANCY TEST: CPT

## 2023-02-28 RX ORDER — SULFAMETHOXAZOLE AND TRIMETHOPRIM 800; 160 MG/1; MG/1
1 TABLET ORAL 2 TIMES DAILY
Qty: 14 TABLET | Refills: 0 | Status: SHIPPED | OUTPATIENT
Start: 2023-02-28 | End: 2023-03-07

## 2023-02-28 RX ORDER — SULFAMETHOXAZOLE AND TRIMETHOPRIM 800; 160 MG/1; MG/1
1 TABLET ORAL ONCE
Status: COMPLETED | OUTPATIENT
Start: 2023-02-28 | End: 2023-02-28

## 2023-02-28 RX ORDER — ONDANSETRON 4 MG/1
4 TABLET, ORALLY DISINTEGRATING ORAL
Status: COMPLETED | OUTPATIENT
Start: 2023-02-28 | End: 2023-02-28

## 2023-02-28 RX ORDER — IBUPROFEN 400 MG/1
800 TABLET ORAL
Status: COMPLETED | OUTPATIENT
Start: 2023-02-28 | End: 2023-02-28

## 2023-02-28 RX ORDER — ONDANSETRON 4 MG/1
4 TABLET, ORALLY DISINTEGRATING ORAL 3 TIMES DAILY PRN
Qty: 15 TABLET | Refills: 0 | Status: SHIPPED | OUTPATIENT
Start: 2023-02-28

## 2023-02-28 RX ORDER — IBUPROFEN 600 MG/1
600 TABLET ORAL 4 TIMES DAILY PRN
Qty: 20 TABLET | Refills: 1 | Status: SHIPPED | OUTPATIENT
Start: 2023-02-28

## 2023-02-28 RX ADMIN — IBUPROFEN 800 MG: 400 TABLET, FILM COATED ORAL at 02:42

## 2023-02-28 RX ADMIN — SULFAMETHOXAZOLE AND TRIMETHOPRIM 1 TABLET: 800; 160 TABLET ORAL at 03:37

## 2023-02-28 RX ADMIN — ONDANSETRON 4 MG: 4 TABLET, ORALLY DISINTEGRATING ORAL at 02:43

## 2023-02-28 ASSESSMENT — PAIN DESCRIPTION - LOCATION
LOCATION: HEAD
LOCATION: HEAD

## 2023-02-28 ASSESSMENT — PAIN SCALES - GENERAL
PAINLEVEL_OUTOF10: 3
PAINLEVEL_OUTOF10: 7

## 2023-02-28 ASSESSMENT — PAIN DESCRIPTION - DESCRIPTORS
DESCRIPTORS: ACHING
DESCRIPTORS: ACHING

## 2023-02-28 NOTE — ED TRIAGE NOTES
Pt to ED for eval of headache and body ache starting yesterday with chest congestion. Pt sent by job.

## 2023-02-28 NOTE — ED PROVIDER NOTES
[facility] EMERGENCY DEPT  EMERGENCY DEPARTMENT ENCOUNTER      Pt Name: Jeaneth Guillen  MRN: [id number]  [place] 1985  Date of evaluation: 2/27/2023  Provider: Kavita Lau MD    CHIEF COMPLAINT       Chief Complaint   Patient presents with    Generalized Body Aches    Headache       HPI:  Jeaneth Guillen is a 30 y.o. female who presents to the emergency department c/o body aches/congestion/headache x 2 days. Says job sent her, needs a work note, unsure if preg. No urinary changes. No n/v/d. No cough. No sob. No cp or abd pain. No meds for sx's pta    HPI    Nursing Notes were reviewed. REVIEW OF SYSTEMS    (2-9 systems for level 4, 10 or more for level 5)     Review of Systems    Except as noted above the remainder of the review of systems was reviewed and negative. PAST MEDICAL HISTORY     Past Medical History:   Diagnosis Date    Anal fissure     Chronic pain     Class 3 severe obesity due to excess calories with serious comorbidity and body mass index (BMI) of 45.0 to 49.9 in adult [facility]) 10/07/2022    Dyslipidemia 04/25/2022    GERD (gastroesophageal reflux disease)     avoids food triggers, ginger ale TUMS    Hypokalemia 10/07/2022    Morbid (severe) obesity due to excess calories ([facility]     Morbid obesity with BMI of 45.0-49.9, adult ([facility]     Primary hypertension 09/29/2022    Refraction disorder     Supposed to wear glasses but does not.      Vitamin D deficiency 04/25/2022         SURGICAL HISTORY       Past Surgical History:   Procedure Laterality Date    FLEXIBLE SIGMOIDOSCOPY N/A 3/12/2021    SIGMOIDOSCOPY FLEXIBLE with bxs performed by Dayday Alvarez MD at [place] [address]       Previous Medications    AMLODIPINE (NORVASC) 5 MG TABLET    TAKE ONE TABLET BY MOUTH DAILY FOR HIGH BLOOD PRESSURE    ERGOCALCIFEROL (ERGOCALCIFEROL) 1.25 MG (65008 UT) CAPSULE    Take 50,000 Units by mouth every 7 days    TRIAMTERENE (DYRENIUM) 50 MG CAPSULE    Take 50 mg by mouth 2 times daily       ALLERGIES     Patient has no known allergies. FAMILY HISTORY       Family History   Problem Relation Age of Onset    Diabetes Brother     Cancer Neg Hx     Heart Disease Neg Hx     Stroke Neg Hx     Hypertension Mother           SOCIAL HISTORY       Social History     Socioeconomic History    Marital status: Single   Tobacco Use    Smoking status: Never    Smokeless tobacco: Never   Substance and Sexual Activity    Alcohol use: Yes     Alcohol/week: 6.0 standard drinks    Drug use: Never   Social History Narrative         ** Merged History Encounter **       SCREENINGS         Teodora Coma Scale  Eye Opening: Spontaneous  Best Verbal Response: Oriented  Best Motor Response: Obeys commands  [place]isco Coma Scale Score: 15                     CIWA Assessment  BP: 126/82  Heart Rate: 86                 PHYSICAL EXAM    (up to 7 for level 4, 8 or more for level 5)     ED Triage Vitals [02/27/23 2151]   BP Temp Temp Source Heart Rate Resp SpO2 Height Weight   126/82 98 °F (36.7 °C) Temporal 86 18 100 % 5' 5\" (1.651 m) 280 lb (127 kg)       Physical Exam  Vitals and nursing note reviewed. Constitutional:       Appearance: Normal appearance. HENT:      Head: Normocephalic and atraumatic. Nose: Congestion present. Mouth/Throat:      Mouth: Mucous membranes are moist.   Eyes:      Conjunctiva/sclera: Conjunctivae normal.   Cardiovascular:      Rate and Rhythm: Normal rate. Pulses: Normal pulses. Pulmonary:      Effort: Pulmonary effort is normal.   Abdominal:      General: Abdomen is flat. Palpations: Abdomen is soft. There is no mass. Tenderness: There is no abdominal tenderness. There is no guarding. Musculoskeletal:         General: Normal range of motion. Cervical back: Normal range of motion. Skin:     Coloration: Skin is not pale. Neurological:      General: No focal deficit present. Mental Status: She is alert. Psychiatric:         Mood and Affect: Mood normal.       DIAGNOSTIC RESULTS     EKG: All EKG's are interpreted by the Emergency Department Physician who either signs or Co-signs this chart in the absence of a cardiologist.      RADIOLOGY:   Non-plain film images such as CT, Ultrasound and MRI are read by the radiologist. Plain radiographic images are visualized and preliminarily interpreted by the emergency physician with the below findings:      Interpretation per the Radiologist below, if available at the time of this note:    No orders to display         LABS:  Labs Reviewed   URINALYSIS - Abnormal; Notable for the following components:       Result Value    Leukocyte Esterase, Urine SMALL (*)     All other components within normal limits   URINALYSIS, MICRO - Abnormal; Notable for the following components:    BACTERIA, URINE 1+ (*)     All other components within normal limits   COVID-19, RAPID   RAPID INFLUENZA A/B ANTIGENS   PREGNANCY, URINE       All other labs were within normal range or not returned as of this dictation. EMERGENCY DEPARTMENT COURSE and DIFFERENTIAL DIAGNOSIS/MDM:   Vitals:    Vitals:    02/27/23 2151   BP: 126/82   Pulse: 86   Resp: 18   Temp: 98 °F (36.7 °C)   TempSrc: Temporal   SpO2: 100%   Weight: 280 lb (127 kg)   Height: 5' 5\" (1.651 m)         Discussion:     3:29 AM no complaints after motrin po/zofran po. No pain, resting. Af, nl vitals. Det ret inst given. Pt verb und and agrees w dc plan. Not c/w meningitis/ich/mass. Det ret inst given . FINAL IMPRESSION      1. Upper respiratory tract infection, unspecified type    2.  Urinary tract infection without hematuria, site unspecified          DISPOSITION/PLAN   DISPOSITION Decision To Discharge 02/28/2023 03:27:13 AM      PATIENT REFERRED TO:  [facility] EMERGENCY DEPT  [address] [facility] [facility]  [phone]  Go to   As needed, If symptoms worsen    [facility]  [address] [place] [address] Jaciel [facility] ALEIDA [address]  [phone]          DISCHARGE MEDICATIONS:  New Prescriptions    IBUPROFEN (ADVIL;MOTRIN) 600 MG TABLET    Take 1 tablet by mouth 4 times daily as needed for Pain    ONDANSETRON (ZOFRAN-ODT) 4 MG DISINTEGRATING TABLET    Take 1 tablet by mouth 3 times daily as needed for Nausea or Vomiting    SULFAMETHOXAZOLE-TRIMETHOPRIM (BACTRIM DS) 800-160 MG PER TABLET    Take 1 tablet by mouth 2 times daily for 7 days     Controlled Substances Monitoring:     No flowsheet data found.     (Please note that portions of this note were completed with a voice recognition program.  Efforts were made to edit the dictations but occasionally words are mis-transcribed.)    Donte Fernando MD (electronically signed)  Attending Emergency Physician          Naeem Conner MD  02/28/23 5384

## 2023-02-28 NOTE — ED NOTES
Pt alert and oriented resting I bed, speaks in full sentences w/o complications. Pt reports headache + generalized body aches x 2 days.       Tran Irwin RN  02/28/23 6752

## 2023-03-12 ENCOUNTER — HOSPITAL ENCOUNTER (EMERGENCY)
Facility: HOSPITAL | Age: 38
Discharge: HOME | End: 2023-03-13
Attending: EMERGENCY MEDICINE
Payer: COMMERCIAL

## 2023-03-12 DIAGNOSIS — G44.89 OTHER HEADACHE SYNDROME: Primary | ICD-10-CM

## 2023-03-12 PROCEDURE — 96375 TX/PRO/DX INJ NEW DRUG ADDON: CPT

## 2023-03-12 PROCEDURE — 99284 EMERGENCY DEPT VISIT MOD MDM: CPT

## 2023-03-12 PROCEDURE — 96374 THER/PROPH/DIAG INJ IV PUSH: CPT

## 2023-03-12 ASSESSMENT — PAIN - FUNCTIONAL ASSESSMENT: PAIN_FUNCTIONAL_ASSESSMENT: 0-10

## 2023-03-12 ASSESSMENT — PAIN SCALES - GENERAL: PAINLEVEL_OUTOF10: 6

## 2023-03-12 ASSESSMENT — PAIN DESCRIPTION - ORIENTATION: ORIENTATION: RIGHT

## 2023-03-12 ASSESSMENT — PAIN DESCRIPTION - LOCATION: LOCATION: HEAD

## 2023-03-12 NOTE — Clinical Note
Sophy Ceballos was seen and treated in our emergency department on 3/12/2023.  She may return to work on 03/14/2023.  The patient was seen in the [place] ER for an emergency medical condition.  We do not put the diagnosis on our work notes in order to protect the confidentiality of our patient encounters as is required by law.        If you have any questions or concerns, please don't hesitate to call.      Sheri Clark MD

## 2023-03-12 NOTE — Clinical Note
Tc Go was seen and treated in our emergency department on 3/12/2023. She may return to work on 03/14/2023. The patient was seen in the [facility] ER for an emergency medical condition. We do not put the diagnosis on our work notes in order to protect the confidentiality of our patient encounters as is required by law. If you have any questions or concerns, please don't hesitate to call.       Beverley Kumar MD

## 2023-03-13 VITALS
TEMPERATURE: 97.1 F | OXYGEN SATURATION: 99 % | DIASTOLIC BLOOD PRESSURE: 82 MMHG | SYSTOLIC BLOOD PRESSURE: 135 MMHG | RESPIRATION RATE: 18 BRPM | BODY MASS INDEX: 46.65 KG/M2 | HEART RATE: 88 BPM | HEIGHT: 65 IN | WEIGHT: 280 LBS

## 2023-03-13 PROCEDURE — 2580000003 HC RX 258: Performed by: EMERGENCY MEDICINE

## 2023-03-13 PROCEDURE — 6360000002 HC RX W HCPCS: Performed by: EMERGENCY MEDICINE

## 2023-03-13 RX ORDER — SODIUM CHLORIDE, SODIUM LACTATE, POTASSIUM CHLORIDE, AND CALCIUM CHLORIDE .6; .31; .03; .02 G/100ML; G/100ML; G/100ML; G/100ML
1000 INJECTION, SOLUTION INTRAVENOUS ONCE
Status: COMPLETED | OUTPATIENT
Start: 2023-03-13 | End: 2023-03-13

## 2023-03-13 RX ORDER — BUTALBITAL, ACETAMINOPHEN AND CAFFEINE 50; 325; 40 MG/1; MG/1; MG/1
1 TABLET ORAL EVERY 4 HOURS PRN
Qty: 20 TABLET | Refills: 0 | Status: SHIPPED | OUTPATIENT
Start: 2023-03-13

## 2023-03-13 RX ORDER — DEXAMETHASONE SODIUM PHOSPHATE 10 MG/ML
10 INJECTION, SOLUTION INTRAMUSCULAR; INTRAVENOUS ONCE
OUTPATIENT
Start: 2023-03-13

## 2023-03-13 RX ORDER — KETOROLAC TROMETHAMINE 15 MG/ML
15 INJECTION, SOLUTION INTRAMUSCULAR; INTRAVENOUS ONCE
Status: COMPLETED | OUTPATIENT
Start: 2023-03-13 | End: 2023-03-13

## 2023-03-13 RX ORDER — DEXAMETHASONE SODIUM PHOSPHATE 4 MG/ML
10 INJECTION, SOLUTION INTRA-ARTICULAR; INTRALESIONAL; INTRAMUSCULAR; INTRAVENOUS; SOFT TISSUE ONCE
Status: COMPLETED | OUTPATIENT
Start: 2023-03-13 | End: 2023-03-13

## 2023-03-13 RX ADMIN — KETOROLAC TROMETHAMINE 15 MG: 15 INJECTION, SOLUTION INTRAMUSCULAR; INTRAVENOUS at 01:34

## 2023-03-13 RX ADMIN — SODIUM CHLORIDE, SODIUM LACTATE, POTASSIUM CHLORIDE, AND CALCIUM CHLORIDE 1000 ML: 600; 310; 30; 20 INJECTION, SOLUTION INTRAVENOUS at 01:34

## 2023-03-13 RX ADMIN — DEXAMETHASONE SODIUM PHOSPHATE 10 MG: 4 INJECTION, SOLUTION INTRAMUSCULAR; INTRAVENOUS at 02:42

## 2023-03-13 NOTE — ED NOTES
A0X4, responds to commands, sitting in semi-flowers position on cell-phone, denies any numbness in face, VS completed, 100% on room air, bed low and locked, call bell within reach, will continue to monitor.     Lennie Rodriguez RN  03/13/23 020

## 2023-03-13 NOTE — ED PROVIDER NOTES
EMERGENCY DEPARTMENT HISTORY AND PHYSICAL EXAM      Patient Name: Liza Miller  MRN: [id number]  YOB: 1985  Provider: Pranav Beebe MD  PCP: Rayray Lagos DNP   Time/Date of evaluation: 1:12 AM EDT on 3/13/23    History of Presenting Illness     Chief Complaint   Patient presents with    Headache       History Provided By: Patient     History Gilma Canal):   Liza Miller is a 30 y.o. female with a PMHX of hypertension, hyperlipidemia, GERD, and chronic pain  who presents to the emergency department  by POV C/O a right-sided headache that began a few hours ago. It started behind her right eye and then moved to her right temple. After that it moved to the back of her head. She took Tylenol this evening which did not help very much. She had similar symptoms approximately 3 weeks ago. She states that she does get headaches when her blood pressure is elevated. Denies any numbness or tingling in her extremities. She also denies any weakness. She denies any visual changes or difficulty speaking or swallowing. Past History     Past Medical History:  Past Medical History:   Diagnosis Date    Anal fissure     Chronic pain     Class 3 severe obesity due to excess calories with serious comorbidity and body mass index (BMI) of 45.0 to 49.9 in adult [facility]) 10/07/2022    Dyslipidemia 04/25/2022    GERD (gastroesophageal reflux disease)     avoids food triggers, ginger ale TUMS    Hypokalemia 10/07/2022    Morbid (severe) obesity due to excess calories ([address]     Morbid obesity with BMI of 45.0-49.9, adult ([facility]     Primary hypertension 09/29/2022    Refraction disorder     Supposed to wear glasses but does not.      Vitamin D deficiency 04/25/2022       Past Surgical History:  Past Surgical History:   Procedure Laterality Date    FLEXIBLE SIGMOIDOSCOPY N/A 3/12/2021    SIGMOIDOSCOPY FLEXIBLE with bxs performed by Shante Cruz MD at [address] [place] [address] History:  Family History   Problem Relation Age of Onset    Diabetes Brother     Cancer Neg Hx     Heart Disease Neg Hx     Stroke Neg Hx     Hypertension Mother        Social History:  Social History     Tobacco Use    Smoking status: Never     Passive exposure: Never    Smokeless tobacco: Never   Substance Use Topics    Alcohol use:  Yes     Alcohol/week: 6.0 standard drinks    Drug use: Never       Medications:  Current Facility-Administered Medications   Medication Dose Route Frequency Provider Last Rate Last Admin    lactated ringers bolus  1,000 mL IntraVENous Once Bren Pearson MD        ketorolac (TORADOL) injection 15 mg  15 mg IntraVENous Once Bren Pearson MD         Current Outpatient Medications   Medication Sig Dispense Refill    ibuprofen (ADVIL;MOTRIN) 600 MG tablet Take 1 tablet by mouth 4 times daily as needed for Pain 20 tablet 1    ondansetron (ZOFRAN-ODT) 4 MG disintegrating tablet Take 1 tablet by mouth 3 times daily as needed for Nausea or Vomiting 15 tablet 0    amLODIPine (NORVASC) 5 MG tablet TAKE ONE TABLET BY MOUTH DAILY FOR HIGH BLOOD PRESSURE      ergocalciferol (ERGOCALCIFEROL) 1.25 MG (07243 UT) capsule Take 50,000 Units by mouth every 7 days      triamterene (DYRENIUM) 50 MG capsule Take 50 mg by mouth 2 times daily (Patient not taking: Reported on 3/12/2023)         Allergies:  No Known Allergies    Social Determinants of Health:  Social Determinants of Health     Tobacco Use: Low Risk     Smoking Tobacco Use: Never    Smokeless Tobacco Use: Never    Passive Exposure: Never   Alcohol Use: Not At Risk    Frequency of Alcohol Consumption: Monthly or less    Average Number of Drinks: 1 or 2    Frequency of Binge Drinking: Never   Financial Resource Strain: Not on file   Food Insecurity: Not on file   Transportation Needs: Not on file   Physical Activity: Not on file   Stress: Not on file   Social Connections: Not on file   Intimate Partner Violence: Not on file   Depression: Not at risk    PHQ-2 Score: 2   Housing Stability: Not on file       Review of Systems     Negative except as listed above in HPI. Physical Exam     Vitals:    03/12/23 2336   BP: (!) 139/101   Pulse: 82   Resp: 18   Temp: 97.1 °F (36.2 °C)   TempSrc: Temporal   SpO2: 99%   Weight: 280 lb (127 kg)   Height: 5' 5\" (1.651 m)     Constitutional: Non-toxic appearing, no acute distress  Head: Normocephalic, Atraumatic  Neck: Supple  Cardiovascular: Regular rate and rhythm, no murmurs, rubs, or gallops  Chest: Normal work of breathing and chest excursion bilaterally  Lungs: Clear to ausculation bilaterally  Abdomen: Soft, non tender, non distended, normoactive bowel sounds  Back: No evidence of trauma or deformity  Extremities: No evidence of trauma or deformity, no LE edema  Skin: Warm and dry, normal cap refill  Neuro: Alert and appropriate, CN intact, normal speech, strength and sensation full and symmetric bilaterally, normal gait, normal coordination  Psychiatric: Normal mood and affect       Diagnostic Study Results     Labs:  No results found for this or any previous visit (from the past 12 hour(s)). Radiologic Studies:   No orders to display       EKG interpretation: (Preliminary)  EKG read by Dr. Jose Roberto Hazel at 0     Procedures     Procedures    ED Course     1:12 AM EDT I Jose Roberto Hazel MD) am the first provider for this patient. Initial assessment performed. I reviewed the vital signs, available nursing notes, past medical history, past surgical history, family history and social history. The patients presenting problems have been discussed, and they are in agreement with the care plan formulated and outlined with them. I have encouraged them to ask questions as they arise throughout their visit. Records Reviewed: Nursing Notes and Old Medical Records    Is this patient to be included in the SEP-1 core measure due to severe sepsis or septic shock?  No Exclusion criteria - the patient is NOT to be included for SEP-1 Core Measure due to: Infection is not suspected    MEDICATIONS ADMINISTERED IN THE ED:  Medications   lactated ringers bolus (1,000 mLs IntraVENous New Bag 3/13/23 0134)   dexamethasone (PF) (DECADRON) injection 10 mg (has no administration in time range)   ketorolac (TORADOL) injection 15 mg (15 mg IntraVENous Given 3/13/23 0134)            Medical Decision Making     CC/HPI Summary, DDx, ED Course, and Reassessment: The patient is a 42-year-old woman with past medical history significant for hypertension, hyperlipidemia, GERD, and chronic pain who presents the ED today for a headache that began several hours ago on the right side. She is a GCS 15 and is neurologically normal.  She received IV Toradol, IV Decadron and IV fluids. She is feeling much better. Her headache is mostly gone. She will be discharged home and will be advised to follow-up with her primary care physician in 2 to 3 days. Return precautions have been given. Disposition Considerations (tests considered but not done, Admit vs D/C, Shared Decision Making, Pt Expectation of Test or Tx.): 0       Critical Care Time:     0    Stephany Pa MD    Diagnosis and Disposition     I Sunita Alejandro MD am the primary clinician of record. DIAGNOSIS:   1.  Other headache syndrome        DISPOSITION        PATIENT REFERRED TO:  Jeniffer Beckman, [facility]  [address] [facility] KATLY[address] [phone]    Schedule an appointment as soon as possible for a visit in 3 days      [facility] EMERGENCY DEPT  [facility] [place] [address] [phone]    As needed, If symptoms worsen    DISCHARGE MEDICATIONS:  Discharge Medication List as of 3/13/2023  2:33 AM        START taking these medications    Details   butalbital-acetaminophen-caffeine (FIORICET, ESGIC) -40 MG per tablet Take 1 tablet by mouth every 4 hours as needed for Headaches Max Daily Amount: 6 tablets, Disp-20 tablet, R-0Normal DISCONTINUED MEDICATIONS:  Discharge Medication List as of 3/13/2023  2:33 AM                 (Please note that portions of this note were completed with a voice recognition program.  Efforts were made to edit the dictations but occasionally words are mis-transcribed.)    Robin Rodriguez MD (electronically signed)        Arturo Banerjee     Please note that this dictation was completed with Qinti, the computer voice recognition software. Quite often unanticipated grammatical, syntax, homophones, and other interpretive errors are inadvertently transcribed by the computer software. Please disregard these errors. Please excuse any errors that have escaped final proofreading.         Liz Sevilla MD  03/18/23 8928

## 2023-03-13 NOTE — ED NOTES
Discharge teaching provided to pt regarding treatment received, medications prescribed, and follow-up care. Pt verbalized understanding directions and follow up care. Pt left ambulatory with discharge paperwork in hand.       Katelyn Tejada RN  03/13/23 4961

## 2023-03-13 NOTE — ED TRIAGE NOTES
A&O female with c/o right sided HA for a few hours. Pain started behind right eye spreading to right ear right side of head and right cheek. Patient reports taking Tylenol around 2200 without any relief.

## 2023-04-23 ENCOUNTER — HOSPITAL ENCOUNTER (EMERGENCY)
Facility: HOSPITAL | Age: 38
Discharge: HOME | End: 2023-04-24
Attending: EMERGENCY MEDICINE
Payer: COMMERCIAL

## 2023-04-23 ENCOUNTER — APPOINTMENT (OUTPATIENT)
Facility: HOSPITAL | Age: 38
End: 2023-04-23
Payer: COMMERCIAL

## 2023-04-23 DIAGNOSIS — K80.20 CALCULUS OF GALLBLADDER WITHOUT CHOLECYSTITIS WITHOUT OBSTRUCTION: ICD-10-CM

## 2023-04-23 DIAGNOSIS — R10.13 EPIGASTRIC PAIN: Primary | ICD-10-CM

## 2023-04-23 LAB
ALBUMIN SERPL-MCNC: 3.5 G/DL (ref 3.4–5)
ALBUMIN/GLOB SERPL: 0.9 (ref 0.8–1.7)
ALP SERPL-CCNC: 66 U/L (ref 45–117)
ALT SERPL-CCNC: 24 U/L (ref 13–56)
ANION GAP SERPL CALC-SCNC: 4 MMOL/L (ref 3–18)
AST SERPL-CCNC: 13 U/L (ref 10–38)
BASOPHILS # BLD: 0 K/UL (ref 0–0.1)
BASOPHILS NFR BLD: 1 % (ref 0–2)
BILIRUB SERPL-MCNC: 0.2 MG/DL (ref 0.2–1)
BUN SERPL-MCNC: 14 MG/DL (ref 7–18)
BUN/CREAT SERPL: 16 (ref 12–20)
CALCIUM SERPL-MCNC: 9.3 MG/DL (ref 8.5–10.1)
CHLORIDE SERPL-SCNC: 107 MMOL/L (ref 100–111)
CO2 SERPL-SCNC: 26 MMOL/L (ref 21–32)
CREAT SERPL-MCNC: 0.88 MG/DL (ref 0.6–1.3)
DIFFERENTIAL METHOD BLD: NORMAL
EOSINOPHIL # BLD: 0 K/UL (ref 0–0.4)
EOSINOPHIL NFR BLD: 1 % (ref 0–5)
ERYTHROCYTE [DISTWIDTH] IN BLOOD BY AUTOMATED COUNT: 12.5 % (ref 11.6–14.5)
GLOBULIN SER CALC-MCNC: 3.8 G/DL (ref 2–4)
GLUCOSE SERPL-MCNC: 104 MG/DL (ref 74–99)
HCG SERPL QL: NEGATIVE
HCT VFR BLD AUTO: 36.9 % (ref 35–45)
HGB BLD-MCNC: 12.3 G/DL (ref 12–16)
IMM GRANULOCYTES # BLD AUTO: 0 K/UL (ref 0–0.04)
IMM GRANULOCYTES NFR BLD AUTO: 0 % (ref 0–0.5)
LIPASE SERPL-CCNC: 212 U/L (ref 73–393)
LYMPHOCYTES # BLD: 2.6 K/UL (ref 0.9–3.6)
LYMPHOCYTES NFR BLD: 46 % (ref 21–52)
MCH RBC QN AUTO: 27.6 PG (ref 24–34)
MCHC RBC AUTO-ENTMCNC: 33.3 G/DL (ref 31–37)
MCV RBC AUTO: 82.9 FL (ref 78–100)
MONOCYTES # BLD: 0.3 K/UL (ref 0.05–1.2)
MONOCYTES NFR BLD: 6 % (ref 3–10)
NEUTS SEG # BLD: 2.6 K/UL (ref 1.8–8)
NEUTS SEG NFR BLD: 46 % (ref 40–73)
NRBC # BLD: 0 K/UL (ref 0–0.01)
NRBC BLD-RTO: 0 PER 100 WBC
PLATELET # BLD AUTO: 350 K/UL (ref 135–420)
PMV BLD AUTO: 10.1 FL (ref 9.2–11.8)
POTASSIUM SERPL-SCNC: 3.5 MMOL/L (ref 3.5–5.5)
PROT SERPL-MCNC: 7.3 G/DL (ref 6.4–8.2)
RBC # BLD AUTO: 4.45 M/UL (ref 4.2–5.3)
SODIUM SERPL-SCNC: 137 MMOL/L (ref 136–145)
TROPONIN I SERPL HS-MCNC: 4 NG/L (ref 0–54)
WBC # BLD AUTO: 5.6 K/UL (ref 4.6–13.2)

## 2023-04-23 PROCEDURE — 6360000002 HC RX W HCPCS: Performed by: EMERGENCY MEDICINE

## 2023-04-23 PROCEDURE — 84484 ASSAY OF TROPONIN QUANT: CPT

## 2023-04-23 PROCEDURE — 71046 X-RAY EXAM CHEST 2 VIEWS: CPT

## 2023-04-23 PROCEDURE — 99285 EMERGENCY DEPT VISIT HI MDM: CPT

## 2023-04-23 PROCEDURE — 2580000003 HC RX 258: Performed by: EMERGENCY MEDICINE

## 2023-04-23 PROCEDURE — 96374 THER/PROPH/DIAG INJ IV PUSH: CPT

## 2023-04-23 PROCEDURE — 93005 ELECTROCARDIOGRAM TRACING: CPT | Performed by: EMERGENCY MEDICINE

## 2023-04-23 PROCEDURE — 96375 TX/PRO/DX INJ NEW DRUG ADDON: CPT

## 2023-04-23 PROCEDURE — 85025 COMPLETE CBC W/AUTO DIFF WBC: CPT

## 2023-04-23 PROCEDURE — 84703 CHORIONIC GONADOTROPIN ASSAY: CPT

## 2023-04-23 PROCEDURE — 2500000003 HC RX 250 WO HCPCS: Performed by: EMERGENCY MEDICINE

## 2023-04-23 PROCEDURE — 80053 COMPREHEN METABOLIC PANEL: CPT

## 2023-04-23 PROCEDURE — 83690 ASSAY OF LIPASE: CPT

## 2023-04-23 PROCEDURE — A4216 STERILE WATER/SALINE, 10 ML: HCPCS | Performed by: EMERGENCY MEDICINE

## 2023-04-23 RX ORDER — ONDANSETRON 2 MG/ML
4 INJECTION INTRAMUSCULAR; INTRAVENOUS
Status: COMPLETED | OUTPATIENT
Start: 2023-04-23 | End: 2023-04-23

## 2023-04-23 RX ADMIN — ONDANSETRON 4 MG: 2 INJECTION INTRAMUSCULAR; INTRAVENOUS at 23:25

## 2023-04-23 RX ADMIN — FAMOTIDINE 20 MG: 10 INJECTION INTRAVENOUS at 23:25

## 2023-04-23 ASSESSMENT — PAIN - FUNCTIONAL ASSESSMENT: PAIN_FUNCTIONAL_ASSESSMENT: 0-10

## 2023-04-23 ASSESSMENT — PAIN DESCRIPTION - LOCATION: LOCATION: ABDOMEN

## 2023-04-23 ASSESSMENT — ENCOUNTER SYMPTOMS
ABDOMINAL PAIN: 1
CHEST TIGHTNESS: 0
NAUSEA: 1
EYES NEGATIVE: 1

## 2023-04-23 ASSESSMENT — PAIN SCALES - GENERAL: PAINLEVEL_OUTOF10: 9

## 2023-04-23 ASSESSMENT — PAIN DESCRIPTION - DESCRIPTORS: DESCRIPTORS: PRESSURE

## 2023-04-24 ENCOUNTER — APPOINTMENT (OUTPATIENT)
Facility: HOSPITAL | Age: 38
End: 2023-04-24
Payer: COMMERCIAL

## 2023-04-24 VITALS
BODY MASS INDEX: 45.15 KG/M2 | RESPIRATION RATE: 20 BRPM | DIASTOLIC BLOOD PRESSURE: 71 MMHG | TEMPERATURE: 97.6 F | SYSTOLIC BLOOD PRESSURE: 117 MMHG | WEIGHT: 271 LBS | HEIGHT: 65 IN | OXYGEN SATURATION: 100 % | HEART RATE: 65 BPM

## 2023-04-24 LAB
EKG ATRIAL RATE: 79 BPM
EKG DIAGNOSIS: NORMAL
EKG P AXIS: 47 DEGREES
EKG P-R INTERVAL: 178 MS
EKG Q-T INTERVAL: 368 MS
EKG QRS DURATION: 106 MS
EKG QTC CALCULATION (BAZETT): 421 MS
EKG R AXIS: 33 DEGREES
EKG T AXIS: 21 DEGREES
EKG VENTRICULAR RATE: 79 BPM
TROPONIN I SERPL HS-MCNC: 5 NG/L (ref 0–54)

## 2023-04-24 PROCEDURE — 6360000002 HC RX W HCPCS: Performed by: EMERGENCY MEDICINE

## 2023-04-24 PROCEDURE — 84484 ASSAY OF TROPONIN QUANT: CPT

## 2023-04-24 PROCEDURE — 74177 CT ABD & PELVIS W/CONTRAST: CPT

## 2023-04-24 PROCEDURE — 93010 ELECTROCARDIOGRAM REPORT: CPT | Performed by: INTERNAL MEDICINE

## 2023-04-24 PROCEDURE — 76705 ECHO EXAM OF ABDOMEN: CPT

## 2023-04-24 PROCEDURE — 6360000004 HC RX CONTRAST MEDICATION: Performed by: EMERGENCY MEDICINE

## 2023-04-24 RX ORDER — ONDANSETRON 4 MG/1
4 TABLET, FILM COATED ORAL DAILY PRN
Qty: 10 TABLET | Refills: 0 | Status: SHIPPED | OUTPATIENT
Start: 2023-04-24 | End: 2023-04-26 | Stop reason: ALTCHOICE

## 2023-04-24 RX ORDER — PYRAZINAMIDE 500 MG/1
1 TABLET ORAL EVERY 4 HOURS PRN
Qty: 6 TABLET | Refills: 0 | Status: SHIPPED | OUTPATIENT
Start: 2023-04-24 | End: 2023-04-27

## 2023-04-24 RX ORDER — KETOROLAC TROMETHAMINE 15 MG/ML
15 INJECTION, SOLUTION INTRAMUSCULAR; INTRAVENOUS
Status: COMPLETED | OUTPATIENT
Start: 2023-04-24 | End: 2023-04-24

## 2023-04-24 RX ADMIN — IOPAMIDOL 100 ML: 612 INJECTION, SOLUTION INTRAVENOUS at 01:17

## 2023-04-24 RX ADMIN — KETOROLAC TROMETHAMINE 15 MG: 15 INJECTION, SOLUTION INTRAMUSCULAR; INTRAVENOUS at 01:04

## 2023-04-24 ASSESSMENT — PAIN DESCRIPTION - DESCRIPTORS: DESCRIPTORS: BURNING;ACHING

## 2023-04-24 ASSESSMENT — PAIN SCALES - GENERAL: PAINLEVEL_OUTOF10: 9

## 2023-04-24 ASSESSMENT — PAIN DESCRIPTION - ORIENTATION: ORIENTATION: MID

## 2023-04-24 ASSESSMENT — PAIN DESCRIPTION - LOCATION: LOCATION: ABDOMEN;BACK

## 2023-04-24 NOTE — ED NOTES
Discharge teaching provided to pt regarding treatment received, medications prescribed, and follow-up care. Pt verbalized understanding directions and follow up care. Pt left ambulatory with discharge paperwork in hand.       Dakotah Couch RN  04/24/23 6524

## 2023-04-24 NOTE — ED NOTES
Rounded on pt, pt states \"I am feeling much better,\" MD made aware.      Rory Ayala, STAN  04/24/23 4377

## 2023-04-24 NOTE — ED TRIAGE NOTES
A&O female with c/o abdominal pain and left arm pain. Patient reports that for approx 1 month she develops pain in her epigastric area after eating or drinking that then spreads to her chest and back. Pain last up to several hours before subsiding. Today pain began this morning around 1100. Tonight while at work pain increased and left arm went numb for approx 15 minutes. Now has pain in left elbow. Denies n/v/d. Denies chest pina/SOB currently.

## 2023-04-24 NOTE — DISCHARGE INSTRUCTIONS
Your work-up today suggest that you have gallstones and is likely causing your pain. Make sure to follow-up with the surgeon that you been seen for your bariatric surgery to discuss this findings. Avoid too much fat in your diet as that will make your pain worse, take the nausea medicine as needed, and return if you develop increasing pain, fevers, vomiting, or you are at all concerned.

## 2023-04-24 NOTE — ED PROVIDER NOTES
EMERGENCY DEPARTMENT HISTORY AND PHYSICAL EXAM    4:32 AM      Date: 4/23/2023  Patient Name: Kate Mcgill    History of Presenting Illness     Chief Complaint   Patient presents with    Gas    Abdominal Pain    Arm Pain         History Provided By: Patient  Location/Duration/Severity/Modifying factors   Patient is a 29-year-old female with a history of obesity, hypertension, GERD, the presents emergency department with complaint of epigastric pain, chest pressure, and pain that goes into her arms that have been intermittent for the last month. Patient is doing medical weight loss program is lost over 25 pounds and has a plan for surgical weight loss if she is cleared. The patient has been started on protein shakes and notes that when she eats that her symptoms come back and then they improve over time and when she tries to eat or drink again symptoms will come back. Patient says that she gets pressure into her chest and so she belches the symptoms improved. Patient does not vomit and denies any constipation or change in her stools. The patient was at work today when started having increasing symptoms so decided to come for evaluation. The patient is seeing Dr. Danny Mir and they are working on surgical plan. Patient denies any leg swelling or calf pain. Patient is taking her blood pressure medication. There are no other known aggravating alleviating factors. Patient has no family history of heart disease. Patient is stop smoking, does not drink alcohol, and denies any drug use. The patient works at the [employer]. PCP: Marie Jauregui DNP    No current facility-administered medications for this encounter.      Current Outpatient Medications   Medication Sig Dispense Refill    ondansetron (ZOFRAN) 4 MG tablet Take 1 tablet by mouth daily as needed for Nausea or Vomiting 10 tablet 0    acetaminophen-codeine (TYLENOL/CODEINE #3) 300-30 MG per tablet Take 1 tablet by mouth every 4 hours

## 2023-04-25 ENCOUNTER — TELEPHONE (OUTPATIENT)
Age: 38
End: 2023-04-25

## 2023-04-25 NOTE — TELEPHONE ENCOUNTER
Patient called in stating that she was seen at the hospital on Sunday and she was diagnosis with gallstones. She was prescribed acetaminophen codeine 300-30 mg, but it makes her sleepy. Is there another medications that can be prescribed. Please Advise

## 2023-04-25 NOTE — TELEPHONE ENCOUNTER
Pain medication can cause sleepiness. She has an appt with her surgeon tomorrow. In the meantime she can take Tylenol or motrin. Remain on low fat diet until she feels better. 4/23/2023 ED Note:  The patient's ultrasound is suggestive of gallstones, no evidence of choledocholithiasis or cholecystitis. The patient's pain is improved. I suspect the patient's symptoms have been related to her biliary disease and the patient has an appointment with her surgeon in the next 48 hours and have asked her to discuss this with him. The patient will go on a low-fat diet, was given supportive care, and will return if at all worsened or concerned.

## 2023-04-26 ENCOUNTER — OFFICE VISIT (OUTPATIENT)
Age: 38
End: 2023-04-26
Payer: COMMERCIAL

## 2023-04-26 VITALS
OXYGEN SATURATION: 99 % | DIASTOLIC BLOOD PRESSURE: 84 MMHG | TEMPERATURE: 97.4 F | SYSTOLIC BLOOD PRESSURE: 135 MMHG | HEART RATE: 82 BPM | HEIGHT: 65 IN | WEIGHT: 270 LBS | BODY MASS INDEX: 44.98 KG/M2

## 2023-04-26 DIAGNOSIS — K21.9 GASTROESOPHAGEAL REFLUX DISEASE, UNSPECIFIED WHETHER ESOPHAGITIS PRESENT: ICD-10-CM

## 2023-04-26 DIAGNOSIS — I10 PRIMARY HYPERTENSION: ICD-10-CM

## 2023-04-26 DIAGNOSIS — E66.01 CLASS 3 SEVERE OBESITY DUE TO EXCESS CALORIES WITH SERIOUS COMORBIDITY AND BODY MASS INDEX (BMI) OF 45.0 TO 49.9 IN ADULT (HCC): ICD-10-CM

## 2023-04-26 DIAGNOSIS — K80.20 GALLSTONES: Primary | ICD-10-CM

## 2023-04-26 DIAGNOSIS — E78.5 DYSLIPIDEMIA: ICD-10-CM

## 2023-04-26 DIAGNOSIS — Z01.812 BLOOD TESTS PRIOR TO TREATMENT OR PROCEDURE: ICD-10-CM

## 2023-04-26 PROCEDURE — 3075F SYST BP GE 130 - 139MM HG: CPT | Performed by: SPECIALIST

## 2023-04-26 PROCEDURE — 3079F DIAST BP 80-89 MM HG: CPT | Performed by: SPECIALIST

## 2023-04-26 PROCEDURE — 99215 OFFICE O/P EST HI 40 MIN: CPT | Performed by: SPECIALIST

## 2023-04-26 RX ORDER — URSODIOL 500 MG/1
500 TABLET, FILM COATED ORAL
Qty: 30 TABLET | Refills: 3 | Status: SHIPPED | OUTPATIENT
Start: 2023-04-26

## 2023-04-26 RX ORDER — NORETHINDRONE 0.35 MG/1
TABLET ORAL
COMMUNITY
Start: 2023-03-21

## 2023-04-26 ASSESSMENT — PATIENT HEALTH QUESTIONNAIRE - PHQ9
1. LITTLE INTEREST OR PLEASURE IN DOING THINGS: 0
2. FEELING DOWN, DEPRESSED OR HOPELESS: 0
SUM OF ALL RESPONSES TO PHQ QUESTIONS 1-9: 0
SUM OF ALL RESPONSES TO PHQ9 QUESTIONS 1 & 2: 0
SUM OF ALL RESPONSES TO PHQ QUESTIONS 1-9: 0

## 2023-04-26 NOTE — PROGRESS NOTES
Bariatric Surgery Preoperative Progress Note    Subjective:     Vinnie Whaley is a 30 y.o. obese female with a Body mass index is 44.93 kg/m². Louise Jernigan she desires surgery at this time because of health issues and quality of life issues. Vinnie Whaley has been seen by a bariatric nutritionist and has been placed on an appropriate low carbohydrate diet. The patient desires gastric bypass for surgical weight loss, however she is here today to review their workup to date. Vinnie Whaley is here also today to check progress with weight loss / evaluate nutritional status and review all subspecialty clearances in hopes of proceeding to the operating room. Since seeing me last time the patient has achieved her weight loss goal and has lost some additional weight also. She unfortunately this past weekend was in the emergency room at [facility] and was found to have cholelithiasis with. She is quite symptomatic having daily pain in the mid epigastric region and has severe nausea with the pain. She was told to follow-up with us in our office regarding these gallstones.     Patient Active Problem List    Diagnosis Date Noted    Gallstones 04/26/2023    Hypokalemia 10/07/2022    Class 3 severe obesity due to excess calories with serious comorbidity and body mass index (BMI) of 45.0 to 49.9 in adult [facility]) 10/07/2022    Primary hypertension 09/29/2022    Vitamin D deficiency 04/25/2022    Dyslipidemia 04/25/2022    Laboratory tests ordered as part of a complete physical exam (CPE) 04/22/2022    GERD (gastroesophageal reflux disease)       Past Surgical History:   Procedure Laterality Date    FLEXIBLE SIGMOIDOSCOPY N/A 3/12/2021    SIGMOIDOSCOPY FLEXIBLE with bxs performed by Amanda Ray MD at [address] [place] [address] History     Tobacco Use    Smoking status: Never     Passive exposure: Never    Smokeless tobacco: Never   Substance Use Topics    Alcohol use: Not Currently

## 2023-04-26 NOTE — PATIENT INSTRUCTIONS
After Surgery: Any complete chewable, such as: [place]s Complete chewables. Avoid [place] sours or gummies. They lack iron and other important nutrients and also have added sugar. Continue with chewable vitamin or change to adult complete multivitamin one month after surgery. Menstruating women can take a prenatal vitamin. Make sure has at least 18 mg iron and 025-459 mcg folic acid):    Vitamin B12, B Complex Vitamin, and Biotin  Start taking within a month after surgery. Vitamin B12:  1000 mcg of Vitamin B12  at least three times weekly    Must take sublingually (meaning you take it under your tongue) or in a liquid drop form for easy absorption. B Complex Vitamin: Take a pill or liquid drop form once daily. Biotin: This vitamin can help prevent hair loss.     Recommend 5mg   (5000 mcg) a day  Biotin is Optional

## 2023-05-01 DIAGNOSIS — G89.18 POST-OP PAIN: Primary | ICD-10-CM

## 2023-05-01 RX ORDER — OXYCODONE HYDROCHLORIDE AND ACETAMINOPHEN 5; 325 MG/1; MG/1
1 TABLET ORAL EVERY 6 HOURS PRN
Qty: 28 TABLET | Refills: 0 | Status: SHIPPED | OUTPATIENT
Start: 2023-05-01 | End: 2023-05-08

## 2023-05-04 ENCOUNTER — HOSPITAL ENCOUNTER (OUTPATIENT)
Facility: HOSPITAL | Age: 38
Discharge: HOME | End: 2023-05-04
Payer: COMMERCIAL

## 2023-05-04 DIAGNOSIS — K80.20 GALLSTONES: ICD-10-CM

## 2023-05-04 DIAGNOSIS — Z01.812 BLOOD TESTS PRIOR TO TREATMENT OR PROCEDURE: ICD-10-CM

## 2023-05-04 DIAGNOSIS — I10 PRIMARY HYPERTENSION: ICD-10-CM

## 2023-05-04 LAB
ABO + RH BLD: NORMAL
ALBUMIN SERPL-MCNC: 3.5 G/DL (ref 3.4–5)
ALBUMIN/GLOB SERPL: 0.9 (ref 0.8–1.7)
ALP SERPL-CCNC: 74 U/L (ref 45–117)
ALT SERPL-CCNC: 19 U/L (ref 13–56)
ANION GAP SERPL CALC-SCNC: 1 MMOL/L (ref 3–18)
AST SERPL-CCNC: 11 U/L (ref 10–38)
BASOPHILS # BLD: 0 K/UL (ref 0–0.1)
BASOPHILS NFR BLD: 1 % (ref 0–2)
BILIRUB SERPL-MCNC: 0.3 MG/DL (ref 0.2–1)
BLOOD GROUP ANTIBODIES SERPL: NORMAL
BUN SERPL-MCNC: 6 MG/DL (ref 7–18)
BUN/CREAT SERPL: 8 (ref 12–20)
CALCIUM SERPL-MCNC: 9.3 MG/DL (ref 8.5–10.1)
CHLORIDE SERPL-SCNC: 108 MMOL/L (ref 100–111)
CO2 SERPL-SCNC: 28 MMOL/L (ref 21–32)
CREAT SERPL-MCNC: 0.76 MG/DL (ref 0.6–1.3)
DIFFERENTIAL METHOD BLD: ABNORMAL
EOSINOPHIL # BLD: 0 K/UL (ref 0–0.4)
EOSINOPHIL NFR BLD: 0 % (ref 0–5)
ERYTHROCYTE [DISTWIDTH] IN BLOOD BY AUTOMATED COUNT: 12.7 % (ref 11.6–14.5)
GLOBULIN SER CALC-MCNC: 3.9 G/DL (ref 2–4)
GLUCOSE SERPL-MCNC: 97 MG/DL (ref 74–99)
HCG SERPL QL: NEGATIVE
HCT VFR BLD AUTO: 38.5 % (ref 35–45)
HGB BLD-MCNC: 12.6 G/DL (ref 12–16)
IMM GRANULOCYTES # BLD AUTO: 0 K/UL (ref 0–0.04)
IMM GRANULOCYTES NFR BLD AUTO: 0 % (ref 0–0.5)
LYMPHOCYTES # BLD: 1.8 K/UL (ref 0.9–3.6)
LYMPHOCYTES NFR BLD: 48 % (ref 21–52)
MCH RBC QN AUTO: 28 PG (ref 24–34)
MCHC RBC AUTO-ENTMCNC: 32.7 G/DL (ref 31–37)
MCV RBC AUTO: 85.6 FL (ref 78–100)
MONOCYTES # BLD: 0.3 K/UL (ref 0.05–1.2)
MONOCYTES NFR BLD: 8 % (ref 3–10)
NEUTS SEG # BLD: 1.7 K/UL (ref 1.8–8)
NEUTS SEG NFR BLD: 44 % (ref 40–73)
NRBC # BLD: 0 K/UL (ref 0–0.01)
NRBC BLD-RTO: 0 PER 100 WBC
PLATELET # BLD AUTO: 329 K/UL (ref 135–420)
PMV BLD AUTO: 9.9 FL (ref 9.2–11.8)
POTASSIUM SERPL-SCNC: 4.1 MMOL/L (ref 3.5–5.5)
PROT SERPL-MCNC: 7.4 G/DL (ref 6.4–8.2)
RBC # BLD AUTO: 4.5 M/UL (ref 4.2–5.3)
SODIUM SERPL-SCNC: 137 MMOL/L (ref 136–145)
SPECIMEN EXP DATE BLD: NORMAL
WBC # BLD AUTO: 3.9 K/UL (ref 4.6–13.2)

## 2023-05-04 PROCEDURE — 80053 COMPREHEN METABOLIC PANEL: CPT

## 2023-05-04 PROCEDURE — 85025 COMPLETE CBC W/AUTO DIFF WBC: CPT

## 2023-05-04 PROCEDURE — 86901 BLOOD TYPING SEROLOGIC RH(D): CPT

## 2023-05-04 PROCEDURE — 86900 BLOOD TYPING SEROLOGIC ABO: CPT

## 2023-05-04 PROCEDURE — 86850 RBC ANTIBODY SCREEN: CPT

## 2023-05-04 PROCEDURE — 84703 CHORIONIC GONADOTROPIN ASSAY: CPT

## 2023-05-04 PROCEDURE — 36415 COLL VENOUS BLD VENIPUNCTURE: CPT

## 2023-05-08 ENCOUNTER — TELEPHONE (OUTPATIENT)
Age: 38
End: 2023-05-08

## 2023-05-12 ENCOUNTER — OFFICE VISIT (OUTPATIENT)
Age: 38
End: 2023-05-12

## 2023-05-12 VITALS
HEIGHT: 65 IN | BODY MASS INDEX: 44.65 KG/M2 | DIASTOLIC BLOOD PRESSURE: 75 MMHG | TEMPERATURE: 97.5 F | OXYGEN SATURATION: 100 % | HEART RATE: 86 BPM | WEIGHT: 268 LBS | SYSTOLIC BLOOD PRESSURE: 126 MMHG

## 2023-05-12 DIAGNOSIS — K80.20 GALLSTONES: Primary | ICD-10-CM

## 2023-05-12 PROCEDURE — 3078F DIAST BP <80 MM HG: CPT | Performed by: SPECIALIST

## 2023-05-12 ASSESSMENT — PATIENT HEALTH QUESTIONNAIRE - PHQ9
SUM OF ALL RESPONSES TO PHQ QUESTIONS 1-9: 0
2. FEELING DOWN, DEPRESSED OR HOPELESS: 0
SUM OF ALL RESPONSES TO PHQ9 QUESTIONS 1 & 2: 0
1. LITTLE INTEREST OR PLEASURE IN DOING THINGS: 0
SUM OF ALL RESPONSES TO PHQ QUESTIONS 1-9: 0

## 2023-05-12 NOTE — PROGRESS NOTES
Surgery Consultation    Subjective:     Tej Dickerson is a patient who is in the pre-op phase for weight loss surgery. She was seen last month in the office with the following notation;     Since seeing me last time the patient has achieved her weight loss goal and has lost some additional weight also. She unfortunately this past weekend was in the emergency room at [facility] and was found to have cholelithiasis with. She is quite symptomatic having daily pain in the mid epigastric region and has severe nausea with the pain. She was told to follow-up with us in our office regarding these gallstones. At this juncture due to the patient's significant symptoms related to her gallstones, I would recommend having that taken care of first.  She understands that the current recommendation for bariatric surgery is to not combine procedures as it increases the risk of the operation and the length of time on the table. Since we are booked out for gastric bypass surgeries for more than 6 weeks I think it would be prudent to go ahead and proceed with cholecystectomy first.  I have given her the option of also starting on Actigall in the short-term to see if her symptoms adelina. She is opting to proceed with surgery to remove her gallbladder first then we will schedule her for her gastric bypass procedure likely in the month of July. Pt is self-referred.     Patient Active Problem List    Diagnosis Date Noted    Gallstones 04/26/2023    Hypokalemia 10/07/2022    Class 3 severe obesity due to excess calories with serious comorbidity and body mass index (BMI) of 45.0 to 49.9 in adult [facility]) 10/07/2022    Primary hypertension 09/29/2022    Vitamin D deficiency 04/25/2022    Dyslipidemia 04/25/2022    Laboratory tests ordered as part of a complete physical exam (CPE) 04/22/2022    GERD (gastroesophageal reflux disease)      Past Medical History:   Diagnosis Date    Anal fissure     Chronic pain     Dyslipidemia

## 2023-05-17 ENCOUNTER — ANESTHESIA EVENT (OUTPATIENT)
Facility: HOSPITAL | Age: 38
End: 2023-05-17
Payer: COMMERCIAL

## 2023-05-18 ENCOUNTER — ANESTHESIA (OUTPATIENT)
Facility: HOSPITAL | Age: 38
End: 2023-05-18
Payer: COMMERCIAL

## 2023-05-18 ENCOUNTER — APPOINTMENT (OUTPATIENT)
Facility: HOSPITAL | Age: 38
End: 2023-05-18
Attending: SPECIALIST
Payer: COMMERCIAL

## 2023-05-18 ENCOUNTER — HOSPITAL ENCOUNTER (OUTPATIENT)
Facility: HOSPITAL | Age: 38
Setting detail: OUTPATIENT SURGERY
Discharge: HOME | End: 2023-05-18
Attending: SPECIALIST | Admitting: SPECIALIST
Payer: COMMERCIAL

## 2023-05-18 VITALS
WEIGHT: 271 LBS | DIASTOLIC BLOOD PRESSURE: 69 MMHG | HEIGHT: 65 IN | OXYGEN SATURATION: 99 % | RESPIRATION RATE: 16 BRPM | HEART RATE: 61 BPM | TEMPERATURE: 97.2 F | BODY MASS INDEX: 45.15 KG/M2 | SYSTOLIC BLOOD PRESSURE: 135 MMHG

## 2023-05-18 DIAGNOSIS — G89.18 POST-OP PAIN: Primary | ICD-10-CM

## 2023-05-18 PROBLEM — K81.0 ACUTE CHOLECYSTITIS: Status: ACTIVE | Noted: 2023-05-18

## 2023-05-18 LAB — HCG UR QL: NEGATIVE

## 2023-05-18 PROCEDURE — 3600000015 HC SURGERY LEVEL 5 ADDTL 15MIN: Performed by: SPECIALIST

## 2023-05-18 PROCEDURE — 7100000010 HC PHASE II RECOVERY - FIRST 15 MIN: Performed by: SPECIALIST

## 2023-05-18 PROCEDURE — 3700000000 HC ANESTHESIA ATTENDED CARE: Performed by: SPECIALIST

## 2023-05-18 PROCEDURE — 2500000003 HC RX 250 WO HCPCS: Performed by: NURSE ANESTHETIST, CERTIFIED REGISTERED

## 2023-05-18 PROCEDURE — 2580000003 HC RX 258: Performed by: SPECIALIST

## 2023-05-18 PROCEDURE — 3600000005 HC SURGERY LEVEL 5 BASE: Performed by: SPECIALIST

## 2023-05-18 PROCEDURE — 6360000002 HC RX W HCPCS: Performed by: SPECIALIST

## 2023-05-18 PROCEDURE — 88304 TISSUE EXAM BY PATHOLOGIST: CPT

## 2023-05-18 PROCEDURE — 81025 URINE PREGNANCY TEST: CPT

## 2023-05-18 PROCEDURE — 47563 LAPARO CHOLECYSTECTOMY/GRAPH: CPT | Performed by: SPECIALIST

## 2023-05-18 PROCEDURE — 2709999900 HC NON-CHARGEABLE SUPPLY: Performed by: SPECIALIST

## 2023-05-18 PROCEDURE — 2720000010 HC SURG SUPPLY STERILE: Performed by: SPECIALIST

## 2023-05-18 PROCEDURE — 6360000002 HC RX W HCPCS: Performed by: NURSE ANESTHETIST, CERTIFIED REGISTERED

## 2023-05-18 PROCEDURE — 2500000003 HC RX 250 WO HCPCS: Performed by: SPECIALIST

## 2023-05-18 PROCEDURE — 74300 X-RAY BILE DUCTS/PANCREAS: CPT

## 2023-05-18 PROCEDURE — 6360000004 HC RX CONTRAST MEDICATION: Performed by: SPECIALIST

## 2023-05-18 PROCEDURE — 7100000001 HC PACU RECOVERY - ADDTL 15 MIN: Performed by: SPECIALIST

## 2023-05-18 PROCEDURE — 3700000001 HC ADD 15 MINUTES (ANESTHESIA): Performed by: SPECIALIST

## 2023-05-18 PROCEDURE — 6370000000 HC RX 637 (ALT 250 FOR IP): Performed by: SPECIALIST

## 2023-05-18 PROCEDURE — 7100000011 HC PHASE II RECOVERY - ADDTL 15 MIN: Performed by: SPECIALIST

## 2023-05-18 PROCEDURE — 7100000000 HC PACU RECOVERY - FIRST 15 MIN: Performed by: SPECIALIST

## 2023-05-18 RX ORDER — DIPHENHYDRAMINE HYDROCHLORIDE 50 MG/ML
12.5 INJECTION INTRAMUSCULAR; INTRAVENOUS
Status: DISCONTINUED | OUTPATIENT
Start: 2023-05-18 | End: 2023-05-18 | Stop reason: HOSPADM

## 2023-05-18 RX ORDER — IPRATROPIUM BROMIDE AND ALBUTEROL SULFATE 2.5; .5 MG/3ML; MG/3ML
1 SOLUTION RESPIRATORY (INHALATION)
Status: DISCONTINUED | OUTPATIENT
Start: 2023-05-18 | End: 2023-05-18 | Stop reason: HOSPADM

## 2023-05-18 RX ORDER — OXYCODONE HYDROCHLORIDE 5 MG/1
5 TABLET ORAL
Status: COMPLETED | OUTPATIENT
Start: 2023-05-18 | End: 2023-05-18

## 2023-05-18 RX ORDER — FENTANYL CITRATE 50 UG/ML
INJECTION, SOLUTION INTRAMUSCULAR; INTRAVENOUS PRN
Status: DISCONTINUED | OUTPATIENT
Start: 2023-05-18 | End: 2023-05-18 | Stop reason: SDUPTHER

## 2023-05-18 RX ORDER — LABETALOL HYDROCHLORIDE 5 MG/ML
10 INJECTION, SOLUTION INTRAVENOUS
Status: DISCONTINUED | OUTPATIENT
Start: 2023-05-18 | End: 2023-05-18 | Stop reason: HOSPADM

## 2023-05-18 RX ORDER — SODIUM CHLORIDE, SODIUM LACTATE, POTASSIUM CHLORIDE, CALCIUM CHLORIDE 600; 310; 30; 20 MG/100ML; MG/100ML; MG/100ML; MG/100ML
INJECTION, SOLUTION INTRAVENOUS CONTINUOUS
Status: DISCONTINUED | OUTPATIENT
Start: 2023-05-18 | End: 2023-05-18 | Stop reason: HOSPADM

## 2023-05-18 RX ORDER — DEXAMETHASONE SODIUM PHOSPHATE 4 MG/ML
INJECTION, SOLUTION INTRA-ARTICULAR; INTRALESIONAL; INTRAMUSCULAR; INTRAVENOUS; SOFT TISSUE PRN
Status: DISCONTINUED | OUTPATIENT
Start: 2023-05-18 | End: 2023-05-18 | Stop reason: SDUPTHER

## 2023-05-18 RX ORDER — ONDANSETRON 2 MG/ML
4 INJECTION INTRAMUSCULAR; INTRAVENOUS
Status: DISCONTINUED | OUTPATIENT
Start: 2023-05-18 | End: 2023-05-18 | Stop reason: HOSPADM

## 2023-05-18 RX ORDER — NEOSTIGMINE METHYLSULFATE 1 MG/ML
INJECTION, SOLUTION INTRAVENOUS PRN
Status: DISCONTINUED | OUTPATIENT
Start: 2023-05-18 | End: 2023-05-18 | Stop reason: SDUPTHER

## 2023-05-18 RX ORDER — MIDAZOLAM HYDROCHLORIDE 1 MG/ML
INJECTION INTRAMUSCULAR; INTRAVENOUS PRN
Status: DISCONTINUED | OUTPATIENT
Start: 2023-05-18 | End: 2023-05-18 | Stop reason: SDUPTHER

## 2023-05-18 RX ORDER — FENTANYL CITRATE 50 UG/ML
25 INJECTION, SOLUTION INTRAMUSCULAR; INTRAVENOUS EVERY 5 MIN PRN
Status: DISCONTINUED | OUTPATIENT
Start: 2023-05-18 | End: 2023-05-18 | Stop reason: HOSPADM

## 2023-05-18 RX ORDER — GLYCOPYRROLATE 0.2 MG/ML
INJECTION INTRAMUSCULAR; INTRAVENOUS PRN
Status: DISCONTINUED | OUTPATIENT
Start: 2023-05-18 | End: 2023-05-18 | Stop reason: SDUPTHER

## 2023-05-18 RX ORDER — SODIUM CHLORIDE 0.9 % (FLUSH) 0.9 %
5-40 SYRINGE (ML) INJECTION PRN
Status: DISCONTINUED | OUTPATIENT
Start: 2023-05-18 | End: 2023-05-18 | Stop reason: HOSPADM

## 2023-05-18 RX ORDER — ROCURONIUM BROMIDE 10 MG/ML
INJECTION, SOLUTION INTRAVENOUS PRN
Status: DISCONTINUED | OUTPATIENT
Start: 2023-05-18 | End: 2023-05-18 | Stop reason: SDUPTHER

## 2023-05-18 RX ORDER — SODIUM CHLORIDE 0.9 % (FLUSH) 0.9 %
5-40 SYRINGE (ML) INJECTION EVERY 12 HOURS SCHEDULED
Status: DISCONTINUED | OUTPATIENT
Start: 2023-05-18 | End: 2023-05-18 | Stop reason: HOSPADM

## 2023-05-18 RX ORDER — OXYCODONE HYDROCHLORIDE AND ACETAMINOPHEN 5; 325 MG/1; MG/1
1 TABLET ORAL EVERY 6 HOURS PRN
Qty: 28 TABLET | Refills: 0 | Status: SHIPPED | OUTPATIENT
Start: 2023-05-18 | End: 2023-05-25

## 2023-05-18 RX ORDER — LABETALOL HYDROCHLORIDE 5 MG/ML
INJECTION, SOLUTION INTRAVENOUS PRN
Status: DISCONTINUED | OUTPATIENT
Start: 2023-05-18 | End: 2023-05-18 | Stop reason: SDUPTHER

## 2023-05-18 RX ORDER — BUPIVACAINE HYDROCHLORIDE AND EPINEPHRINE 2.5; 5 MG/ML; UG/ML
INJECTION, SOLUTION EPIDURAL; INFILTRATION; INTRACAUDAL; PERINEURAL PRN
Status: DISCONTINUED | OUTPATIENT
Start: 2023-05-18 | End: 2023-05-18 | Stop reason: ALTCHOICE

## 2023-05-18 RX ORDER — SODIUM CHLORIDE 9 MG/ML
INJECTION, SOLUTION INTRAVENOUS PRN
Status: DISCONTINUED | OUTPATIENT
Start: 2023-05-18 | End: 2023-05-18 | Stop reason: HOSPADM

## 2023-05-18 RX ORDER — HYDROMORPHONE HYDROCHLORIDE 1 MG/ML
0.25 INJECTION, SOLUTION INTRAMUSCULAR; INTRAVENOUS; SUBCUTANEOUS EVERY 5 MIN PRN
Status: DISCONTINUED | OUTPATIENT
Start: 2023-05-18 | End: 2023-05-18 | Stop reason: HOSPADM

## 2023-05-18 RX ORDER — HYDROMORPHONE HCL 110MG/55ML
PATIENT CONTROLLED ANALGESIA SYRINGE INTRAVENOUS PRN
Status: DISCONTINUED | OUTPATIENT
Start: 2023-05-18 | End: 2023-05-18 | Stop reason: SDUPTHER

## 2023-05-18 RX ORDER — DROPERIDOL 2.5 MG/ML
0.62 INJECTION, SOLUTION INTRAMUSCULAR; INTRAVENOUS
Status: DISCONTINUED | OUTPATIENT
Start: 2023-05-18 | End: 2023-05-18 | Stop reason: HOSPADM

## 2023-05-18 RX ORDER — LIDOCAINE HYDROCHLORIDE 20 MG/ML
INJECTION, SOLUTION EPIDURAL; INFILTRATION; INTRACAUDAL; PERINEURAL PRN
Status: DISCONTINUED | OUTPATIENT
Start: 2023-05-18 | End: 2023-05-18 | Stop reason: SDUPTHER

## 2023-05-18 RX ORDER — ONDANSETRON 2 MG/ML
INJECTION INTRAMUSCULAR; INTRAVENOUS PRN
Status: DISCONTINUED | OUTPATIENT
Start: 2023-05-18 | End: 2023-05-18 | Stop reason: SDUPTHER

## 2023-05-18 RX ADMIN — DEXAMETHASONE SODIUM PHOSPHATE 4 MG: 4 INJECTION, SOLUTION INTRAMUSCULAR; INTRAVENOUS at 12:36

## 2023-05-18 RX ADMIN — Medication 3 MG: at 13:05

## 2023-05-18 RX ADMIN — LIDOCAINE HYDROCHLORIDE 100 MG: 20 INJECTION, SOLUTION EPIDURAL; INFILTRATION; INTRACAUDAL; PERINEURAL at 12:16

## 2023-05-18 RX ADMIN — LABETALOL HYDROCHLORIDE 5 MG: 5 INJECTION INTRAVENOUS at 12:40

## 2023-05-18 RX ADMIN — MIDAZOLAM 2 MG: 1 INJECTION INTRAMUSCULAR; INTRAVENOUS at 12:07

## 2023-05-18 RX ADMIN — SODIUM CHLORIDE, SODIUM LACTATE, POTASSIUM CHLORIDE, AND CALCIUM CHLORIDE: 600; 310; 30; 20 INJECTION, SOLUTION INTRAVENOUS at 09:37

## 2023-05-18 RX ADMIN — Medication 3000 MG: at 12:21

## 2023-05-18 RX ADMIN — SODIUM CHLORIDE, SODIUM LACTATE, POTASSIUM CHLORIDE, AND CALCIUM CHLORIDE: 600; 310; 30; 20 INJECTION, SOLUTION INTRAVENOUS at 13:10

## 2023-05-18 RX ADMIN — OXYCODONE HYDROCHLORIDE 5 MG: 5 TABLET ORAL at 14:21

## 2023-05-18 RX ADMIN — FENTANYL CITRATE 100 MCG: 50 INJECTION, SOLUTION INTRAMUSCULAR; INTRAVENOUS at 12:11

## 2023-05-18 RX ADMIN — GLYCOPYRROLATE 0.4 MG: 0.2 INJECTION, SOLUTION INTRAMUSCULAR; INTRAVENOUS at 13:05

## 2023-05-18 RX ADMIN — ONDANSETRON HYDROCHLORIDE 4 MG: 2 INJECTION INTRAMUSCULAR; INTRAVENOUS at 13:05

## 2023-05-18 RX ADMIN — ROCURONIUM BROMIDE 50 MG: 10 INJECTION, SOLUTION INTRAVENOUS at 12:16

## 2023-05-18 RX ADMIN — HYDROMORPHONE HYDROCHLORIDE 1 MG: 2 INJECTION, SOLUTION INTRAMUSCULAR; INTRAVENOUS; SUBCUTANEOUS at 12:20

## 2023-05-18 ASSESSMENT — PAIN SCALES - GENERAL
PAINLEVEL_OUTOF10: 3
PAINLEVEL_OUTOF10: 0
PAINLEVEL_OUTOF10: 0
PAINLEVEL_OUTOF10: 7
PAINLEVEL_OUTOF10: 7

## 2023-05-18 ASSESSMENT — PAIN DESCRIPTION - LOCATION
LOCATION: ABDOMEN

## 2023-05-18 ASSESSMENT — PAIN - FUNCTIONAL ASSESSMENT: PAIN_FUNCTIONAL_ASSESSMENT: 0-10

## 2023-05-18 ASSESSMENT — PAIN DESCRIPTION - PAIN TYPE: TYPE: SURGICAL PAIN

## 2023-05-18 ASSESSMENT — PAIN DESCRIPTION - DESCRIPTORS
DESCRIPTORS: ACHING
DESCRIPTORS: ACHING

## 2023-05-18 NOTE — BRIEF OP NOTE
Brief Postoperative Note      Patient: Annmarie Pulido  YOB: 1985  MRN: [id number]    Date of Procedure: 5/18/2023    Pre-Op Diagnosis Codes:     * Abdominal pain, epigastric [R10.13]    Post-Op Diagnosis:  acute cholecystitis       Procedure(s):  LAPAROSCOPIC CHOLECYSTECTOMY INTEPERATIVE CHOLANGIOGRAM WITH C-ARM    Surgeon(s):  Elly Verdin MD    Assistant:  Surgical Assistant: Jeana Bañuelos  Physician Assistant: JENAE Banks    Anesthesia: General    Estimated Blood Loss (mL): Minimal    Complications: None    Specimens:   ID Type Source Tests Collected by Time Destination   A : GALLBLADDER AND CONTENTS Tissue Gallbladder SURGICAL PATHOLOGY Elly Verdin MD 5/18/2023 1249        Implants:  * No implants in log *      Drains: * No LDAs found *    Findings:acute cholecystitis      Electronically signed by Elly Verdin MD on 5/18/2023 at 1:05 PM

## 2023-05-18 NOTE — H&P
Surgery Consultation     Subjective:      Shanon Ram is a patient who is in the pre-op phase for weight loss surgery. She was seen last month in the office with the following notation;      Since seeing me last time the patient has achieved her weight loss goal and has lost some additional weight also. She unfortunately this past weekend was in the emergency room at [facility] and was found to have cholelithiasis with. She is quite symptomatic having daily pain in the mid epigastric region and has severe nausea with the pain. She was told to follow-up with us in our office regarding these gallstones. At this juncture due to the patient's significant symptoms related to her gallstones, I would recommend having that taken care of first.  She understands that the current recommendation for bariatric surgery is to not combine procedures as it increases the risk of the operation and the length of time on the table. Since we are booked out for gastric bypass surgeries for more than 6 weeks I think it would be prudent to go ahead and proceed with cholecystectomy first.  I have given her the option of also starting on Actigall in the short-term to see if her symptoms adelina. She is opting to proceed with surgery to remove her gallbladder first then we will schedule her for her gastric bypass procedure likely in the month of July. Pt is self-referred.           Patient Active Problem List     Diagnosis Date Noted    Gallstones 04/26/2023    Hypokalemia 10/07/2022    Class 3 severe obesity due to excess calories with serious comorbidity and body mass index (BMI) of 45.0 to 49.9 in adult [facility]) 10/07/2022    Primary hypertension 09/29/2022    Vitamin D deficiency 04/25/2022    Dyslipidemia 04/25/2022    Laboratory tests ordered as part of a complete physical exam (CPE) 04/22/2022    GERD (gastroesophageal reflux disease)        Past Medical History        Past Medical History:   Diagnosis Date    Anal

## 2023-05-18 NOTE — DISCHARGE INSTRUCTIONS
a history of heart failure or if you experience any of the following symptoms:  Weight gain of 3 pounds or more overnight or 5 pounds in a week, increased swelling in our hands or feet or shortness of breath while lying flat in bed. Please call your doctor as soon as you notice any of these symptoms; do not wait until your next office visit. The discharge information has been reviewed with the patient and caregiver. The patient and caregiver verbalized understanding. Discharge medications reviewed with the patient and caregiver and appropriate educational materials and side effects teaching were provided. ___________________________________________________________________________________________________________________________________     Laparoscopy: What to Expect at [address] [place] [address]  After laparoscopic surgery, you are likely to have pain for the next several days. You may have a low fever and feel tired and sick to your stomach. This is common. You should feel better after 1 to 2 weeks. This care sheet gives you a general idea about how long it will take for you to recover. But each person recovers at a different pace. Follow the steps below to get better as quickly as possible. How can you care for yourself at home? Activity    Rest when you feel tired. Getting enough sleep will help you recover. Try to walk each day. Start by walking a little more than you did the day before. Bit by bit, increase the amount you walk. Walking boosts blood flow and helps prevent pneumonia and constipation. Avoid strenuous activities, such as bicycle riding, jogging, weight lifting, or aerobic exercise, until your doctor says it is okay. Avoid lifting anything that would make you strain. This may include a child, heavy grocery bags and milk containers, a heavy briefcase or backpack, cat litter or dog food bags, or a vacuum . You may also have some shoulder or back pain.  This pain is caused

## 2023-05-18 NOTE — ANESTHESIA PRE PROCEDURE
Negative Neuro/Psych ROS              GI/Hepatic/Renal:   (+) morbid obesity     (-) GERD       Endo/Other: Negative Endo/Other ROS                    Abdominal:             Vascular: negative vascular ROS. Other Findings:           Anesthesia Plan      general     ASA 3       Induction: intravenous. MIPS: Postoperative opioids intended. Anesthetic plan and risks discussed with patient and sibling. Plan discussed with CRNA.     Attending anesthesiologist reviewed and agrees with Preprocedure content                Tristan Elias MD   5/18/2023

## 2023-05-18 NOTE — PERIOP NOTE
Reviewed PTA medication list with patient/caregiver and patient/caregiver denies any additional medications. Patient admits to having a responsible adult (khadijah) care for them at home for at least 24 hours after surgery. Patient encouraged to use gown warming system and informed that using said warming gown to regulate body temperature prior to a procedure has been shown to help reduce the risks of blood clots and infection. Patient's pharmacy of choice verified and documented in PTA medication section. Dual skin assessment & fall risk band verification completed with Rosmery PIERCE. Urine pregnancy results negative and verified with Melissa Marsh.
TRANSFER - IN REPORT:    Verbal report received from [address] and Son CRNA on [employer]  being received from OR for routine post-op      Report consisted of patient's Situation, Background, Assessment and   Recommendations(SBAR). Information from the following report(s) Nurse Handoff Report, Adult Overview, Surgery Report, Intake/Output, and MAR was reviewed with the receiving nurse. Opportunity for questions and clarification was provided. Assessment completed upon patient's arrival to unit and care assumed.
TRANSFER - IN REPORT:    Verbal report received from selina caballero on [place]  being received from pacu for routine post-op      Report consisted of patient's Situation, Background, Assessment and   Recommendations(SBAR). Information from the following report(s) Nurse Handoff Report was reviewed with the receiving nurse. Opportunity for questions and clarification was provided. Assessment completed upon patient's arrival to unit and care assumed.
TRANSFER - OUT REPORT:    Verbal report given to Asher Graham on Poonam Fong  being transferred to Phase II for routine post-op       Report consisted of patient's Situation, Background, Assessment and   Recommendations(SBAR). Information from the following report(s) Nurse Handoff Report, Surgery Report, Intake/Output, and MAR was reviewed with the receiving nurse. Sidney Assessment: No data recorded  Lines:   Peripheral IV 05/18/23 Left Forearm (Active)   Site Assessment Clean, dry & intact 05/18/23 1327   Line Status Infusing 05/18/23 10 42 [facility] Connections checked and tightened 05/18/23 1327   Phlebitis Assessment No symptoms 05/18/23 1327   Infiltration Assessment 0 05/18/23 1327   Alcohol Cap Used No 05/18/23 1327   Dressing Status Clean, dry & intact 05/18/23 1327   Dressing Type Transparent 05/18/23 1327   Dressing Intervention New 05/18/23 0936        Opportunity for questions and clarification was provided.       Patient transported with:  O2 @ 2lpm and Tech
Yes

## 2023-05-18 NOTE — ANESTHESIA POSTPROCEDURE EVALUATION
Post-Anesthesia Evaluation and Assessment    Cardiovascular Function/Vital Signs/Pain Score:  Vitals  BP: (!) 139/94  Temp: 98 °F (36.7 °C)  Temp Source: Temporal  Pulse: 60  Respirations: 16  SpO2: 100 %  Height: 5' 5\" (165.1 cm)  Weight - Scale: 271 lb (122.9 kg)  Pain Level: 10    Patient is status post Procedure(s):  LAPAROSCOPIC CHOLECYSTECTOMY INTEPERATIVE CHOLANGIOGRAM WITH C-ARM. Nausea/Vomiting: Controlled. Postoperative hydration reviewed and adequate. Pain:  Managed    Mental Status and Level of Consciousness: Baseline and appropriate for discharge. Adequate oxygenation and airway patent.     Complications related to anesthesia:        Signed By: Vera Cuenca MD    May 18, 2023

## 2023-05-19 NOTE — OP NOTE
[facility]  OPERATIVE REPORT    Name:  Agnes Kayser  MR#:   [id number]  :  1985  ACCOUNT #:  [de-identified]  DATE OF SERVICE:  2023      PREOPERATIVE DIAGNOSIS:  Symptomatic cholelithiasis. POSTOPERATIVE DIAGNOSIS:  Acute cholecystitis. PROCEDURE PERFORMED:  Laparoscopic cholecystectomy with intraoperative cholangiography. SURGEON:  Ewa Franklin MD    ASSISTANT:  JENAE Samayoa. JENAE Dunn assisted with the procedure since there were no qualified surgeons, interns or residents available. He assisted with exposure during the procedure, removal of gallbladder from liver bed, cholangiography, and closure of skin and fascial incisions. ANESTHESIA:  General endotracheal.    COMPLICATIONS:  None. SPECIMENS REMOVED:  Gallbladder specimen. IMPLANTS:    ESTIMATED BLOOD LOSS:  None. STATEMENT OF MEDICAL NECESSITY:  The patient is a 29-year-old female *** considering weight loss surgery. She has known cholelithiasis and very symptomatic. She is still completing her preoperative medical weight loss program for her bariatric procedure and she is so symptomatic at this time period to go ahead and proceed with cholecystectomy first.    PROCEDURE:  The patient was brought to the operating room and placed on the table in supine position. General anesthesia was administered without any difficulty. Her abdomen was then prepped and draped in sterile fashion. This patient had a very unusual abdominal build, her umbilicus was essentially at her pelvis. I made a supraumbilical incision in the midline. I insufflated the abdomen with Veress needle and then placed a VisiPort at that site. Three additional trocars were placed in the right subcostal fashion. On inspection of the gallbladder, it was mildly inflamed. Gallbladder was completely intrahepatic and elevating the gallbladder above the liver was very difficult and exposure was very poor.   I began dissecting region

## 2023-05-31 ENCOUNTER — OFFICE VISIT (OUTPATIENT)
Age: 38
End: 2023-05-31

## 2023-05-31 VITALS
HEIGHT: 65 IN | BODY MASS INDEX: 44.64 KG/M2 | HEART RATE: 78 BPM | OXYGEN SATURATION: 100 % | WEIGHT: 267.9 LBS | DIASTOLIC BLOOD PRESSURE: 87 MMHG | TEMPERATURE: 97.4 F | SYSTOLIC BLOOD PRESSURE: 135 MMHG

## 2023-05-31 DIAGNOSIS — Z09 POSTOPERATIVE EXAMINATION: Primary | ICD-10-CM

## 2023-05-31 PROCEDURE — 99024 POSTOP FOLLOW-UP VISIT: CPT | Performed by: NURSE PRACTITIONER

## 2023-05-31 NOTE — PROGRESS NOTES
Subjective:      Charlee Cevallos is a 30 y.o. female presents for postop care 13 days status post laparoscopic cholecystectomy. Pain is  well controlled. Appetite is normal. Eating a regular diet without difficulty. Bowel movements are alternating diarrhea and regularity. Objective:     /87 (Site: Right Upper Arm, Position: Sitting, Cuff Size: Large Adult)   Pulse 78   Temp 97.4 °F (36.3 °C)   Ht 5' 5\" (1.651 m)   Wt 267 lb 14.4 oz (121.5 kg)   LMP 05/01/2023 (Approximate)   SpO2 100%   BMI 44.58 kg/m²     General:  alert, cooperative, no distress, appears stated age   Abdomen: soft, bowel sounds active, non-tender   Incision:   healing well, no drainage, no erythema, no hernia, no seroma, no swelling, no dehiscence, incision well approximated       Pathology:  GALLBLADDER, CHOLECYSTECTOMY:        CHRONIC CHOLECYSTITIS AND CHOLELITHIASIS. GROSS DESCRIPTION:   Received in formalin labeled with patient's identifiers and \"gallbladder   and contents\", is an intact gallbladder measuring 10.3 cm in length by 2.7   cm diameter. The cystic duct is patent. Adjacent to the cystic duct is a   lymph node measuring 0.8 cm in greatest dimension. The serosa is purple   to green with a scant amount of adhesions. The hepatic bed has cautery   artifact. The specimen is opened to reveal an abundance of dark green   viscous bile admixed with more than 50 yellow calculi ranging from 0.3-1.0   cm in greatest dimension. The mucosa is stained green and velvety. The   wall measures 0.1 cm thick. Representative sections submitted in cassette   A1 including lymph node, cystic duct resection margin, body and fundus. Assessment:     Doing well postoperatively. Discussed changes in stool with gallbladder removal, avoid high fats, greasy foods. Consider adding fiber. Plan:     - Wound care discussed  - Pt is to increase activities as tolerated.   - followup as scheduled or if patient has questions,

## 2023-05-31 NOTE — PATIENT INSTRUCTIONS
Continue focus on hydration. May advance to soft diet. Eat regularly. Continue to use protein shakes. Remember to eat slowly & not to drink fluids with your meals. Increase activity as able   Continue to take multi-vitamins. Continue regular follow-up with your PCP. Return to office as scheduled for your next appointment. Call the office if you have any questions or concerns.

## 2023-06-23 ENCOUNTER — TELEPHONE (OUTPATIENT)
Age: 38
End: 2023-06-23

## 2023-06-23 ENCOUNTER — HOSPITAL ENCOUNTER (OUTPATIENT)
Facility: HOSPITAL | Age: 38
Setting detail: SPECIMEN
End: 2023-06-23
Payer: COMMERCIAL

## 2023-06-23 DIAGNOSIS — I10 PRIMARY HYPERTENSION: ICD-10-CM

## 2023-06-23 DIAGNOSIS — I10 PRIMARY HYPERTENSION: Primary | ICD-10-CM

## 2023-06-23 LAB
ALBUMIN SERPL-MCNC: 3.4 G/DL (ref 3.4–5)
ALBUMIN/GLOB SERPL: 0.9 (ref 0.8–1.7)
ALP SERPL-CCNC: 74 U/L (ref 45–117)
ALT SERPL-CCNC: 24 U/L (ref 13–56)
ANION GAP SERPL CALC-SCNC: 7 MMOL/L (ref 3–18)
AST SERPL-CCNC: 8 U/L (ref 10–38)
BILIRUB SERPL-MCNC: 0.3 MG/DL (ref 0.2–1)
BUN SERPL-MCNC: 10 MG/DL (ref 7–18)
BUN/CREAT SERPL: 14 (ref 12–20)
CALCIUM SERPL-MCNC: 9.2 MG/DL (ref 8.5–10.1)
CHLORIDE SERPL-SCNC: 107 MMOL/L (ref 100–111)
CO2 SERPL-SCNC: 25 MMOL/L (ref 21–32)
CREAT SERPL-MCNC: 0.74 MG/DL (ref 0.6–1.3)
GLOBULIN SER CALC-MCNC: 3.7 G/DL (ref 2–4)
GLUCOSE SERPL-MCNC: 98 MG/DL (ref 74–99)
POTASSIUM SERPL-SCNC: 4 MMOL/L (ref 3.5–5.5)
PROT SERPL-MCNC: 7.1 G/DL (ref 6.4–8.2)
SODIUM SERPL-SCNC: 139 MMOL/L (ref 136–145)

## 2023-06-23 PROCEDURE — 36415 COLL VENOUS BLD VENIPUNCTURE: CPT

## 2023-06-23 PROCEDURE — 80053 COMPREHEN METABOLIC PANEL: CPT

## 2023-06-26 ENCOUNTER — HOSPITAL ENCOUNTER (OUTPATIENT)
Facility: HOSPITAL | Age: 38
Discharge: HOME | End: 2023-06-29

## 2023-06-26 ENCOUNTER — CLINICAL DOCUMENTATION (OUTPATIENT)
Age: 38
End: 2023-06-26

## 2023-06-26 ENCOUNTER — HOSPITAL ENCOUNTER (OUTPATIENT)
Facility: HOSPITAL | Age: 38
Discharge: HOME | End: 2023-06-29
Payer: COMMERCIAL

## 2023-06-26 ENCOUNTER — OFFICE VISIT (OUTPATIENT)
Age: 38
End: 2023-06-26
Payer: COMMERCIAL

## 2023-06-26 VITALS
RESPIRATION RATE: 16 BRPM | WEIGHT: 262 LBS | BODY MASS INDEX: 43.65 KG/M2 | DIASTOLIC BLOOD PRESSURE: 76 MMHG | HEIGHT: 65 IN | SYSTOLIC BLOOD PRESSURE: 125 MMHG | OXYGEN SATURATION: 100 % | HEART RATE: 89 BPM | TEMPERATURE: 98 F

## 2023-06-26 DIAGNOSIS — E66.01 MORBID OBESITY WITH BODY MASS INDEX (BMI) OF 40.0 TO 49.9 (HCC): ICD-10-CM

## 2023-06-26 DIAGNOSIS — I10 ESSENTIAL HYPERTENSION, MALIGNANT: ICD-10-CM

## 2023-06-26 DIAGNOSIS — I10 PRIMARY HYPERTENSION: ICD-10-CM

## 2023-06-26 DIAGNOSIS — G89.18 POST-OP PAIN: ICD-10-CM

## 2023-06-26 DIAGNOSIS — E66.01 MORBID OBESITY (HCC): ICD-10-CM

## 2023-06-26 DIAGNOSIS — K21.9 GASTROESOPHAGEAL REFLUX DISEASE, UNSPECIFIED WHETHER ESOPHAGITIS PRESENT: ICD-10-CM

## 2023-06-26 DIAGNOSIS — E66.01 CLASS 3 SEVERE OBESITY DUE TO EXCESS CALORIES WITH SERIOUS COMORBIDITY AND BODY MASS INDEX (BMI) OF 45.0 TO 49.9 IN ADULT (HCC): Primary | ICD-10-CM

## 2023-06-26 PROBLEM — K80.20 GALLSTONES: Status: RESOLVED | Noted: 2023-04-26 | Resolved: 2023-06-26

## 2023-06-26 PROBLEM — K81.0 ACUTE CHOLECYSTITIS: Status: RESOLVED | Noted: 2023-05-18 | Resolved: 2023-06-26

## 2023-06-26 PROBLEM — E87.6 HYPOKALEMIA: Status: RESOLVED | Noted: 2022-10-07 | Resolved: 2023-06-26

## 2023-06-26 LAB
ABO + RH BLD: NORMAL
ALBUMIN SERPL-MCNC: 3.6 G/DL (ref 3.4–5)
ALBUMIN/GLOB SERPL: 0.9 (ref 0.8–1.7)
ALP SERPL-CCNC: 82 U/L (ref 45–117)
ALT SERPL-CCNC: 27 U/L (ref 13–56)
ANION GAP SERPL CALC-SCNC: 4 MMOL/L (ref 3–18)
AST SERPL-CCNC: 14 U/L (ref 10–38)
BASOPHILS # BLD: 0 K/UL (ref 0–0.1)
BASOPHILS NFR BLD: 1 % (ref 0–2)
BILIRUB SERPL-MCNC: 0.2 MG/DL (ref 0.2–1)
BLOOD GROUP ANTIBODIES SERPL: NORMAL
BUN SERPL-MCNC: 19 MG/DL (ref 7–18)
BUN/CREAT SERPL: 27 (ref 12–20)
CALCIUM SERPL-MCNC: 9.4 MG/DL (ref 8.5–10.1)
CHLORIDE SERPL-SCNC: 107 MMOL/L (ref 100–111)
CO2 SERPL-SCNC: 27 MMOL/L (ref 21–32)
CREAT SERPL-MCNC: 0.71 MG/DL (ref 0.6–1.3)
DIFFERENTIAL METHOD BLD: NORMAL
EOSINOPHIL # BLD: 0 K/UL (ref 0–0.4)
EOSINOPHIL NFR BLD: 1 % (ref 0–5)
ERYTHROCYTE [DISTWIDTH] IN BLOOD BY AUTOMATED COUNT: 12.6 % (ref 11.6–14.5)
GLOBULIN SER CALC-MCNC: 4.1 G/DL (ref 2–4)
GLUCOSE SERPL-MCNC: 95 MG/DL (ref 74–99)
HCG SERPL QL: NEGATIVE
HCT VFR BLD AUTO: 37.5 % (ref 35–45)
HGB BLD-MCNC: 12.5 G/DL (ref 12–16)
IMM GRANULOCYTES # BLD AUTO: 0 K/UL (ref 0–0.04)
IMM GRANULOCYTES NFR BLD AUTO: 0 % (ref 0–0.5)
LYMPHOCYTES # BLD: 2.2 K/UL (ref 0.9–3.6)
LYMPHOCYTES NFR BLD: 39 % (ref 21–52)
MCH RBC QN AUTO: 27.8 PG (ref 24–34)
MCHC RBC AUTO-ENTMCNC: 33.3 G/DL (ref 31–37)
MCV RBC AUTO: 83.5 FL (ref 78–100)
MONOCYTES # BLD: 0.4 K/UL (ref 0.05–1.2)
MONOCYTES NFR BLD: 8 % (ref 3–10)
NEUTS SEG # BLD: 2.9 K/UL (ref 1.8–8)
NEUTS SEG NFR BLD: 52 % (ref 40–73)
NRBC # BLD: 0 K/UL (ref 0–0.01)
NRBC BLD-RTO: 0 PER 100 WBC
PLATELET # BLD AUTO: 354 K/UL (ref 135–420)
PMV BLD AUTO: 9.6 FL (ref 9.2–11.8)
POTASSIUM SERPL-SCNC: 3.7 MMOL/L (ref 3.5–5.5)
PROT SERPL-MCNC: 7.7 G/DL (ref 6.4–8.2)
RBC # BLD AUTO: 4.49 M/UL (ref 4.2–5.3)
SODIUM SERPL-SCNC: 138 MMOL/L (ref 136–145)
SPECIMEN EXP DATE BLD: NORMAL
WBC # BLD AUTO: 5.6 K/UL (ref 4.6–13.2)

## 2023-06-26 PROCEDURE — 3078F DIAST BP <80 MM HG: CPT | Performed by: SPECIALIST

## 2023-06-26 PROCEDURE — 3074F SYST BP LT 130 MM HG: CPT | Performed by: SPECIALIST

## 2023-06-26 PROCEDURE — 84703 CHORIONIC GONADOTROPIN ASSAY: CPT

## 2023-06-26 PROCEDURE — 36415 COLL VENOUS BLD VENIPUNCTURE: CPT

## 2023-06-26 PROCEDURE — 80053 COMPREHEN METABOLIC PANEL: CPT

## 2023-06-26 PROCEDURE — 85025 COMPLETE CBC W/AUTO DIFF WBC: CPT

## 2023-06-26 PROCEDURE — 86900 BLOOD TYPING SEROLOGIC ABO: CPT

## 2023-06-26 PROCEDURE — 86850 RBC ANTIBODY SCREEN: CPT

## 2023-06-26 PROCEDURE — 99215 OFFICE O/P EST HI 40 MIN: CPT | Performed by: SPECIALIST

## 2023-06-26 PROCEDURE — 86901 BLOOD TYPING SEROLOGIC RH(D): CPT

## 2023-06-26 RX ORDER — ONDANSETRON HYDROCHLORIDE 8 MG/1
8 TABLET, FILM COATED ORAL EVERY 8 HOURS PRN
Qty: 30 TABLET | Refills: 0 | Status: SHIPPED | OUTPATIENT
Start: 2023-06-26

## 2023-06-26 RX ORDER — ENOXAPARIN SODIUM 100 MG/ML
INJECTION SUBCUTANEOUS
Qty: 20 EACH | Refills: 0 | Status: SHIPPED | OUTPATIENT
Start: 2023-06-26 | End: 2023-07-06

## 2023-06-26 RX ORDER — OXYCODONE HYDROCHLORIDE AND ACETAMINOPHEN 5; 325 MG/1; MG/1
1 TABLET ORAL EVERY 6 HOURS PRN
Qty: 28 TABLET | Refills: 0 | Status: SHIPPED | OUTPATIENT
Start: 2023-06-26 | End: 2023-07-03

## 2023-06-30 ENCOUNTER — OFFICE VISIT (OUTPATIENT)
Age: 38
End: 2023-06-30
Payer: COMMERCIAL

## 2023-06-30 VITALS
OXYGEN SATURATION: 98 % | BODY MASS INDEX: 43.38 KG/M2 | WEIGHT: 260.38 LBS | HEIGHT: 65 IN | TEMPERATURE: 97.7 F | DIASTOLIC BLOOD PRESSURE: 79 MMHG | RESPIRATION RATE: 18 BRPM | SYSTOLIC BLOOD PRESSURE: 128 MMHG | HEART RATE: 80 BPM

## 2023-06-30 DIAGNOSIS — I10 PRIMARY HYPERTENSION: Primary | ICD-10-CM

## 2023-06-30 DIAGNOSIS — E66.1 CLASS 3 DRUG-INDUCED OBESITY WITHOUT SERIOUS COMORBIDITY WITH BODY MASS INDEX (BMI) OF 40.0 TO 44.9 IN ADULT (HCC): ICD-10-CM

## 2023-06-30 DIAGNOSIS — E55.9 VITAMIN D DEFICIENCY: ICD-10-CM

## 2023-06-30 PROBLEM — Z98.84 BARIATRIC SURGERY STATUS: Status: ACTIVE | Noted: 2023-06-30

## 2023-06-30 PROBLEM — E66.01 CLASS 3 SEVERE OBESITY DUE TO EXCESS CALORIES WITH SERIOUS COMORBIDITY AND BODY MASS INDEX (BMI) OF 45.0 TO 49.9 IN ADULT (HCC): Status: RESOLVED | Noted: 2022-10-07 | Resolved: 2023-06-30

## 2023-06-30 PROBLEM — E66.813 CLASS 3 SEVERE OBESITY DUE TO EXCESS CALORIES WITH SERIOUS COMORBIDITY AND BODY MASS INDEX (BMI) OF 45.0 TO 49.9 IN ADULT: Status: RESOLVED | Noted: 2022-10-07 | Resolved: 2023-06-30

## 2023-06-30 PROBLEM — E66.01 MORBID OBESITY WITH BODY MASS INDEX (BMI) OF 40.0 TO 49.9 (HCC): Status: RESOLVED | Noted: 2023-06-26 | Resolved: 2023-06-30

## 2023-06-30 PROBLEM — E66.813 CLASS 3 DRUG-INDUCED OBESITY WITHOUT SERIOUS COMORBIDITY WITH BODY MASS INDEX (BMI) OF 40.0 TO 44.9 IN ADULT: Status: ACTIVE | Noted: 2023-06-30

## 2023-06-30 PROCEDURE — 99213 OFFICE O/P EST LOW 20 MIN: CPT | Performed by: NURSE PRACTITIONER

## 2023-06-30 PROCEDURE — 3074F SYST BP LT 130 MM HG: CPT | Performed by: NURSE PRACTITIONER

## 2023-06-30 PROCEDURE — 3078F DIAST BP <80 MM HG: CPT | Performed by: NURSE PRACTITIONER

## 2023-06-30 RX ORDER — CHOLECALCIFEROL (VITAMIN D3) 50 MCG
2000 TABLET ORAL DAILY
Qty: 30 TABLET | Refills: 11 | COMMUNITY
Start: 2023-06-30

## 2023-06-30 SDOH — ECONOMIC STABILITY: TRANSPORTATION INSECURITY
IN THE PAST 12 MONTHS, HAS LACK OF TRANSPORTATION KEPT YOU FROM MEETINGS, WORK, OR FROM GETTING THINGS NEEDED FOR DAILY LIVING?: NO

## 2023-06-30 SDOH — ECONOMIC STABILITY: INCOME INSECURITY: HOW HARD IS IT FOR YOU TO PAY FOR THE VERY BASICS LIKE FOOD, HOUSING, MEDICAL CARE, AND HEATING?: NOT HARD AT ALL

## 2023-06-30 SDOH — ECONOMIC STABILITY: HOUSING INSECURITY
IN THE LAST 12 MONTHS, WAS THERE A TIME WHEN YOU DID NOT HAVE A STEADY PLACE TO SLEEP OR SLEPT IN A SHELTER (INCLUDING NOW)?: NO

## 2023-06-30 SDOH — ECONOMIC STABILITY: FOOD INSECURITY: WITHIN THE PAST 12 MONTHS, THE FOOD YOU BOUGHT JUST DIDN'T LAST AND YOU DIDN'T HAVE MONEY TO GET MORE.: NEVER TRUE

## 2023-06-30 SDOH — ECONOMIC STABILITY: FOOD INSECURITY: WITHIN THE PAST 12 MONTHS, YOU WORRIED THAT YOUR FOOD WOULD RUN OUT BEFORE YOU GOT MONEY TO BUY MORE.: NEVER TRUE

## 2023-07-05 ENCOUNTER — ANESTHESIA EVENT (OUTPATIENT)
Facility: HOSPITAL | Age: 38
End: 2023-07-05
Payer: COMMERCIAL

## 2023-07-06 ENCOUNTER — ANESTHESIA (OUTPATIENT)
Facility: HOSPITAL | Age: 38
End: 2023-07-06
Payer: COMMERCIAL

## 2023-07-06 ENCOUNTER — HOSPITAL ENCOUNTER (INPATIENT)
Facility: HOSPITAL | Age: 38
LOS: 3 days | Discharge: HOME | DRG: 621 | End: 2023-07-09
Attending: SPECIALIST | Admitting: SPECIALIST
Payer: COMMERCIAL

## 2023-07-06 PROBLEM — K76.0 NAFLD (NONALCOHOLIC FATTY LIVER DISEASE): Status: ACTIVE | Noted: 2023-07-06

## 2023-07-06 PROBLEM — E66.01 MORBID OBESITY WITH BODY MASS INDEX (BMI) OF 40.0 OR HIGHER (HCC): Status: ACTIVE | Noted: 2023-07-06

## 2023-07-06 PROBLEM — K44.9 DIAPHRAGMATIC HERNIA WITHOUT OBSTRUCTION AND WITHOUT GANGRENE: Status: ACTIVE | Noted: 2023-07-06

## 2023-07-06 LAB — HCG UR QL: NEGATIVE

## 2023-07-06 PROCEDURE — 2580000003 HC RX 258: Performed by: SPECIALIST

## 2023-07-06 PROCEDURE — 0DJ08ZZ INSPECTION OF UPPER INTESTINAL TRACT, VIA NATURAL OR ARTIFICIAL OPENING ENDOSCOPIC: ICD-10-PCS | Performed by: SPECIALIST

## 2023-07-06 PROCEDURE — 6360000002 HC RX W HCPCS: Performed by: NURSE ANESTHETIST, CERTIFIED REGISTERED

## 2023-07-06 PROCEDURE — 0BQT4ZZ REPAIR DIAPHRAGM, PERCUTANEOUS ENDOSCOPIC APPROACH: ICD-10-PCS | Performed by: SPECIALIST

## 2023-07-06 PROCEDURE — 6360000002 HC RX W HCPCS: Performed by: SPECIALIST

## 2023-07-06 PROCEDURE — 43281 LAP PARAESOPHAG HERN REPAIR: CPT | Performed by: SPECIALIST

## 2023-07-06 PROCEDURE — 3600000005 HC SURGERY LEVEL 5 BASE: Performed by: SPECIALIST

## 2023-07-06 PROCEDURE — 2709999900 HC NON-CHARGEABLE SUPPLY: Performed by: SPECIALIST

## 2023-07-06 PROCEDURE — 2720000010 HC SURG SUPPLY STERILE: Performed by: SPECIALIST

## 2023-07-06 PROCEDURE — 81025 URINE PREGNANCY TEST: CPT

## 2023-07-06 PROCEDURE — 3700000000 HC ANESTHESIA ATTENDED CARE: Performed by: SPECIALIST

## 2023-07-06 PROCEDURE — 0D164ZA BYPASS STOMACH TO JEJUNUM, PERCUTANEOUS ENDOSCOPIC APPROACH: ICD-10-PCS | Performed by: SPECIALIST

## 2023-07-06 PROCEDURE — 47379 UNLISTED LAPS PX LIVER: CPT | Performed by: SPECIALIST

## 2023-07-06 PROCEDURE — 2500000003 HC RX 250 WO HCPCS: Performed by: NURSE ANESTHETIST, CERTIFIED REGISTERED

## 2023-07-06 PROCEDURE — C9113 INJ PANTOPRAZOLE SODIUM, VIA: HCPCS | Performed by: SPECIALIST

## 2023-07-06 PROCEDURE — A4216 STERILE WATER/SALINE, 10 ML: HCPCS | Performed by: SPECIALIST

## 2023-07-06 PROCEDURE — 6370000000 HC RX 637 (ALT 250 FOR IP): Performed by: SPECIALIST

## 2023-07-06 PROCEDURE — 0FB04ZX EXCISION OF LIVER, PERCUTANEOUS ENDOSCOPIC APPROACH, DIAGNOSTIC: ICD-10-PCS | Performed by: SPECIALIST

## 2023-07-06 PROCEDURE — 7100000001 HC PACU RECOVERY - ADDTL 15 MIN: Performed by: SPECIALIST

## 2023-07-06 PROCEDURE — 43644 LAP GASTRIC BYPASS/ROUX-EN-Y: CPT | Performed by: SPECIALIST

## 2023-07-06 PROCEDURE — 88307 TISSUE EXAM BY PATHOLOGIST: CPT

## 2023-07-06 PROCEDURE — 7100000000 HC PACU RECOVERY - FIRST 15 MIN: Performed by: SPECIALIST

## 2023-07-06 PROCEDURE — 1100000000 HC RM PRIVATE

## 2023-07-06 PROCEDURE — C1713 ANCHOR/SCREW BN/BN,TIS/BN: HCPCS | Performed by: SPECIALIST

## 2023-07-06 PROCEDURE — 2500000003 HC RX 250 WO HCPCS: Performed by: SPECIALIST

## 2023-07-06 PROCEDURE — 3600000015 HC SURGERY LEVEL 5 ADDTL 15MIN: Performed by: SPECIALIST

## 2023-07-06 PROCEDURE — 3700000001 HC ADD 15 MINUTES (ANESTHESIA): Performed by: SPECIALIST

## 2023-07-06 PROCEDURE — 6360000002 HC RX W HCPCS: Performed by: ANESTHESIOLOGY

## 2023-07-06 PROCEDURE — 88313 SPECIAL STAINS GROUP 2: CPT

## 2023-07-06 RX ORDER — HYDROMORPHONE HYDROCHLORIDE 1 MG/ML
0.5 INJECTION, SOLUTION INTRAMUSCULAR; INTRAVENOUS; SUBCUTANEOUS EVERY 5 MIN PRN
Status: DISCONTINUED | OUTPATIENT
Start: 2023-07-06 | End: 2023-07-06 | Stop reason: HOSPADM

## 2023-07-06 RX ORDER — SODIUM CHLORIDE 0.9 % (FLUSH) 0.9 %
5-40 SYRINGE (ML) INJECTION PRN
Status: DISCONTINUED | OUTPATIENT
Start: 2023-07-06 | End: 2023-07-09 | Stop reason: HOSPADM

## 2023-07-06 RX ORDER — ONDANSETRON 2 MG/ML
4 INJECTION INTRAMUSCULAR; INTRAVENOUS
Status: COMPLETED | OUTPATIENT
Start: 2023-07-06 | End: 2023-07-06

## 2023-07-06 RX ORDER — FENTANYL CITRATE 50 UG/ML
INJECTION, SOLUTION INTRAMUSCULAR; INTRAVENOUS PRN
Status: DISCONTINUED | OUTPATIENT
Start: 2023-07-06 | End: 2023-07-06 | Stop reason: SDUPTHER

## 2023-07-06 RX ORDER — DIPHENHYDRAMINE HYDROCHLORIDE 50 MG/ML
12.5 INJECTION INTRAMUSCULAR; INTRAVENOUS
Status: DISCONTINUED | OUTPATIENT
Start: 2023-07-06 | End: 2023-07-06 | Stop reason: HOSPADM

## 2023-07-06 RX ORDER — OXYCODONE HYDROCHLORIDE 5 MG/1
5 TABLET ORAL
Status: DISCONTINUED | OUTPATIENT
Start: 2023-07-06 | End: 2023-07-06 | Stop reason: HOSPADM

## 2023-07-06 RX ORDER — METOCLOPRAMIDE HYDROCHLORIDE 5 MG/ML
INJECTION INTRAMUSCULAR; INTRAVENOUS PRN
Status: DISCONTINUED | OUTPATIENT
Start: 2023-07-06 | End: 2023-07-06 | Stop reason: SDUPTHER

## 2023-07-06 RX ORDER — FENTANYL CITRATE 50 UG/ML
25 INJECTION, SOLUTION INTRAMUSCULAR; INTRAVENOUS EVERY 5 MIN PRN
Status: COMPLETED | OUTPATIENT
Start: 2023-07-06 | End: 2023-07-06

## 2023-07-06 RX ORDER — SODIUM CHLORIDE 0.9 % (FLUSH) 0.9 %
5-40 SYRINGE (ML) INJECTION EVERY 12 HOURS SCHEDULED
Status: DISCONTINUED | OUTPATIENT
Start: 2023-07-06 | End: 2023-07-06 | Stop reason: HOSPADM

## 2023-07-06 RX ORDER — ROCURONIUM BROMIDE 10 MG/ML
INJECTION, SOLUTION INTRAVENOUS PRN
Status: DISCONTINUED | OUTPATIENT
Start: 2023-07-06 | End: 2023-07-06 | Stop reason: SDUPTHER

## 2023-07-06 RX ORDER — SODIUM CHLORIDE 0.9 % (FLUSH) 0.9 %
5-40 SYRINGE (ML) INJECTION EVERY 12 HOURS SCHEDULED
Status: DISCONTINUED | OUTPATIENT
Start: 2023-07-06 | End: 2023-07-09 | Stop reason: HOSPADM

## 2023-07-06 RX ORDER — ONDANSETRON 2 MG/ML
INJECTION INTRAMUSCULAR; INTRAVENOUS PRN
Status: DISCONTINUED | OUTPATIENT
Start: 2023-07-06 | End: 2023-07-06 | Stop reason: SDUPTHER

## 2023-07-06 RX ORDER — MEPERIDINE HYDROCHLORIDE 50 MG/ML
12.5 INJECTION INTRAMUSCULAR; INTRAVENOUS; SUBCUTANEOUS ONCE
Status: DISCONTINUED | OUTPATIENT
Start: 2023-07-06 | End: 2023-07-06 | Stop reason: HOSPADM

## 2023-07-06 RX ORDER — SODIUM CHLORIDE, SODIUM LACTATE, POTASSIUM CHLORIDE, CALCIUM CHLORIDE 600; 310; 30; 20 MG/100ML; MG/100ML; MG/100ML; MG/100ML
INJECTION, SOLUTION INTRAVENOUS CONTINUOUS
Status: DISCONTINUED | OUTPATIENT
Start: 2023-07-06 | End: 2023-07-08

## 2023-07-06 RX ORDER — IPRATROPIUM BROMIDE AND ALBUTEROL SULFATE 2.5; .5 MG/3ML; MG/3ML
1 SOLUTION RESPIRATORY (INHALATION)
Status: DISCONTINUED | OUTPATIENT
Start: 2023-07-06 | End: 2023-07-06 | Stop reason: HOSPADM

## 2023-07-06 RX ORDER — SODIUM CHLORIDE 9 MG/ML
INJECTION, SOLUTION INTRAVENOUS PRN
Status: DISCONTINUED | OUTPATIENT
Start: 2023-07-06 | End: 2023-07-09 | Stop reason: HOSPADM

## 2023-07-06 RX ORDER — MIDAZOLAM HYDROCHLORIDE 1 MG/ML
INJECTION INTRAMUSCULAR; INTRAVENOUS PRN
Status: DISCONTINUED | OUTPATIENT
Start: 2023-07-06 | End: 2023-07-06 | Stop reason: SDUPTHER

## 2023-07-06 RX ORDER — LABETALOL HYDROCHLORIDE 5 MG/ML
10 INJECTION, SOLUTION INTRAVENOUS
Status: DISCONTINUED | OUTPATIENT
Start: 2023-07-06 | End: 2023-07-06 | Stop reason: HOSPADM

## 2023-07-06 RX ORDER — GLYCOPYRROLATE 0.2 MG/ML
INJECTION INTRAMUSCULAR; INTRAVENOUS PRN
Status: DISCONTINUED | OUTPATIENT
Start: 2023-07-06 | End: 2023-07-06 | Stop reason: SDUPTHER

## 2023-07-06 RX ORDER — SODIUM CHLORIDE, SODIUM LACTATE, POTASSIUM CHLORIDE, CALCIUM CHLORIDE 600; 310; 30; 20 MG/100ML; MG/100ML; MG/100ML; MG/100ML
INJECTION, SOLUTION INTRAVENOUS CONTINUOUS
Status: DISCONTINUED | OUTPATIENT
Start: 2023-07-06 | End: 2023-07-06

## 2023-07-06 RX ORDER — ENOXAPARIN SODIUM 100 MG/ML
40 INJECTION SUBCUTANEOUS EVERY 12 HOURS SCHEDULED
Status: DISCONTINUED | OUTPATIENT
Start: 2023-07-06 | End: 2023-07-08

## 2023-07-06 RX ORDER — ENOXAPARIN SODIUM 100 MG/ML
40 INJECTION SUBCUTANEOUS ONCE
Status: COMPLETED | OUTPATIENT
Start: 2023-07-06 | End: 2023-07-06

## 2023-07-06 RX ORDER — KETAMINE HCL IN NACL, ISO-OSM 100MG/10ML
SYRINGE (ML) INJECTION PRN
Status: DISCONTINUED | OUTPATIENT
Start: 2023-07-06 | End: 2023-07-06 | Stop reason: SDUPTHER

## 2023-07-06 RX ORDER — SODIUM CHLORIDE 9 MG/ML
INJECTION, SOLUTION INTRAVENOUS PRN
Status: DISCONTINUED | OUTPATIENT
Start: 2023-07-06 | End: 2023-07-06 | Stop reason: HOSPADM

## 2023-07-06 RX ORDER — HYDROMORPHONE HYDROCHLORIDE 1 MG/ML
INJECTION, SOLUTION INTRAMUSCULAR; INTRAVENOUS; SUBCUTANEOUS PRN
Status: DISCONTINUED | OUTPATIENT
Start: 2023-07-06 | End: 2023-07-06 | Stop reason: SDUPTHER

## 2023-07-06 RX ORDER — MORPHINE SULFATE 10 MG/ML
6 INJECTION, SOLUTION INTRAMUSCULAR; INTRAVENOUS
Status: DISCONTINUED | OUTPATIENT
Start: 2023-07-06 | End: 2023-07-07

## 2023-07-06 RX ORDER — ACETAMINOPHEN 500 MG
1000 TABLET ORAL ONCE
Status: COMPLETED | OUTPATIENT
Start: 2023-07-06 | End: 2023-07-06

## 2023-07-06 RX ORDER — SODIUM CHLORIDE 0.9 % (FLUSH) 0.9 %
5-40 SYRINGE (ML) INJECTION PRN
Status: DISCONTINUED | OUTPATIENT
Start: 2023-07-06 | End: 2023-07-06 | Stop reason: HOSPADM

## 2023-07-06 RX ORDER — METOCLOPRAMIDE HYDROCHLORIDE 5 MG/ML
10 INJECTION INTRAMUSCULAR; INTRAVENOUS EVERY 6 HOURS
Status: DISCONTINUED | OUTPATIENT
Start: 2023-07-06 | End: 2023-07-07

## 2023-07-06 RX ORDER — LIDOCAINE HYDROCHLORIDE 20 MG/ML
INJECTION, SOLUTION EPIDURAL; INFILTRATION; INTRACAUDAL; PERINEURAL PRN
Status: DISCONTINUED | OUTPATIENT
Start: 2023-07-06 | End: 2023-07-06 | Stop reason: SDUPTHER

## 2023-07-06 RX ORDER — BUPIVACAINE HYDROCHLORIDE 2.5 MG/ML
INJECTION, SOLUTION EPIDURAL; INFILTRATION; INTRACAUDAL PRN
Status: DISCONTINUED | OUTPATIENT
Start: 2023-07-06 | End: 2023-07-06 | Stop reason: ALTCHOICE

## 2023-07-06 RX ORDER — PROPOFOL 10 MG/ML
INJECTION, EMULSION INTRAVENOUS PRN
Status: DISCONTINUED | OUTPATIENT
Start: 2023-07-06 | End: 2023-07-06 | Stop reason: SDUPTHER

## 2023-07-06 RX ORDER — DROPERIDOL 2.5 MG/ML
0.62 INJECTION, SOLUTION INTRAMUSCULAR; INTRAVENOUS
Status: DISCONTINUED | OUTPATIENT
Start: 2023-07-06 | End: 2023-07-06 | Stop reason: HOSPADM

## 2023-07-06 RX ORDER — ONDANSETRON 2 MG/ML
4 INJECTION INTRAMUSCULAR; INTRAVENOUS EVERY 6 HOURS PRN
Status: DISCONTINUED | OUTPATIENT
Start: 2023-07-06 | End: 2023-07-09 | Stop reason: HOSPADM

## 2023-07-06 RX ADMIN — Medication 20 MG: at 07:48

## 2023-07-06 RX ADMIN — MORPHINE SULFATE 6 MG: 10 INJECTION INTRAVENOUS at 20:00

## 2023-07-06 RX ADMIN — LIDOCAINE HYDROCHLORIDE 60 MG: 20 INJECTION, SOLUTION EPIDURAL; INFILTRATION; INTRACAUDAL; PERINEURAL at 07:30

## 2023-07-06 RX ADMIN — SODIUM CHLORIDE, SODIUM LACTATE, POTASSIUM CHLORIDE, AND CALCIUM CHLORIDE: 600; 310; 30; 20 INJECTION, SOLUTION INTRAVENOUS at 06:06

## 2023-07-06 RX ADMIN — FENTANYL CITRATE 50 MCG: 50 INJECTION, SOLUTION INTRAMUSCULAR; INTRAVENOUS at 07:35

## 2023-07-06 RX ADMIN — HYDROMORPHONE HYDROCHLORIDE 0.5 MG: 1 INJECTION, SOLUTION INTRAMUSCULAR; INTRAVENOUS; SUBCUTANEOUS at 09:53

## 2023-07-06 RX ADMIN — SODIUM CHLORIDE, SODIUM LACTATE, POTASSIUM CHLORIDE, AND CALCIUM CHLORIDE: 600; 310; 30; 20 INJECTION, SOLUTION INTRAVENOUS at 09:09

## 2023-07-06 RX ADMIN — ACETAMINOPHEN 1000 MG: 500 TABLET ORAL at 06:03

## 2023-07-06 RX ADMIN — FENTANYL CITRATE 25 MCG: 50 INJECTION, SOLUTION INTRAMUSCULAR; INTRAVENOUS at 09:56

## 2023-07-06 RX ADMIN — NYSTATIN 500000 UNITS: 100000 SUSPENSION ORAL at 06:03

## 2023-07-06 RX ADMIN — FENTANYL CITRATE 25 MCG: 50 INJECTION, SOLUTION INTRAMUSCULAR; INTRAVENOUS at 10:01

## 2023-07-06 RX ADMIN — MIDAZOLAM 2 MG: 1 INJECTION INTRAMUSCULAR; INTRAVENOUS at 07:25

## 2023-07-06 RX ADMIN — PANTOPRAZOLE SODIUM 40 MG: 40 INJECTION, POWDER, FOR SOLUTION INTRAVENOUS at 19:07

## 2023-07-06 RX ADMIN — FENTANYL CITRATE 25 MCG: 50 INJECTION, SOLUTION INTRAMUSCULAR; INTRAVENOUS at 10:06

## 2023-07-06 RX ADMIN — METOCLOPRAMIDE 10 MG: 5 INJECTION, SOLUTION INTRAMUSCULAR; INTRAVENOUS at 09:02

## 2023-07-06 RX ADMIN — SODIUM CHLORIDE, SODIUM LACTATE, POTASSIUM CHLORIDE, AND CALCIUM CHLORIDE: 600; 310; 30; 20 INJECTION, SOLUTION INTRAVENOUS at 08:01

## 2023-07-06 RX ADMIN — ENOXAPARIN SODIUM 40 MG: 100 INJECTION SUBCUTANEOUS at 19:06

## 2023-07-06 RX ADMIN — FENTANYL CITRATE 50 MCG: 50 INJECTION, SOLUTION INTRAMUSCULAR; INTRAVENOUS at 07:30

## 2023-07-06 RX ADMIN — PROPOFOL 150 MG: 10 INJECTION, EMULSION INTRAVENOUS at 07:31

## 2023-07-06 RX ADMIN — ENOXAPARIN SODIUM 40 MG: 100 INJECTION SUBCUTANEOUS at 06:03

## 2023-07-06 RX ADMIN — HYDROMORPHONE HYDROCHLORIDE 0.5 MG: 1 INJECTION, SOLUTION INTRAMUSCULAR; INTRAVENOUS; SUBCUTANEOUS at 09:48

## 2023-07-06 RX ADMIN — ONDANSETRON 4 MG: 2 INJECTION INTRAMUSCULAR; INTRAVENOUS at 09:49

## 2023-07-06 RX ADMIN — ONDANSETRON HYDROCHLORIDE 4 MG: 2 INJECTION INTRAMUSCULAR; INTRAVENOUS at 07:27

## 2023-07-06 RX ADMIN — Medication 30 MG: at 07:30

## 2023-07-06 RX ADMIN — ONDANSETRON 4 MG: 2 INJECTION INTRAMUSCULAR; INTRAVENOUS at 16:13

## 2023-07-06 RX ADMIN — CEFAZOLIN 2000 MG: 10 INJECTION, POWDER, FOR SOLUTION INTRAVENOUS at 17:12

## 2023-07-06 RX ADMIN — Medication 3000 MG: at 07:37

## 2023-07-06 RX ADMIN — ROCURONIUM BROMIDE 20 MG: 10 INJECTION, SOLUTION INTRAVENOUS at 08:26

## 2023-07-06 RX ADMIN — SODIUM CHLORIDE, PRESERVATIVE FREE 20 MG: 5 INJECTION INTRAVENOUS at 06:06

## 2023-07-06 RX ADMIN — FENTANYL CITRATE 25 MCG: 50 INJECTION, SOLUTION INTRAMUSCULAR; INTRAVENOUS at 10:15

## 2023-07-06 RX ADMIN — GLYCOPYRROLATE 0.2 MG: 0.2 INJECTION, SOLUTION INTRAMUSCULAR; INTRAVENOUS at 07:27

## 2023-07-06 RX ADMIN — ROCURONIUM BROMIDE 50 MG: 10 INJECTION, SOLUTION INTRAVENOUS at 07:32

## 2023-07-06 RX ADMIN — HYDROMORPHONE HYDROCHLORIDE 1 MG: 1 INJECTION, SOLUTION INTRAMUSCULAR; INTRAVENOUS; SUBCUTANEOUS at 08:08

## 2023-07-06 RX ADMIN — METOCLOPRAMIDE 10 MG: 5 INJECTION, SOLUTION INTRAMUSCULAR; INTRAVENOUS at 19:37

## 2023-07-06 ASSESSMENT — PAIN DESCRIPTION - DESCRIPTORS
DESCRIPTORS: ACHING
DESCRIPTORS: PRESSURE
DESCRIPTORS: ACHING

## 2023-07-06 ASSESSMENT — PAIN SCALES - GENERAL
PAINLEVEL_OUTOF10: 3
PAINLEVEL_OUTOF10: 6
PAINLEVEL_OUTOF10: 7
PAINLEVEL_OUTOF10: 7
PAINLEVEL_OUTOF10: 6
PAINLEVEL_OUTOF10: 9
PAINLEVEL_OUTOF10: 0
PAINLEVEL_OUTOF10: 8
PAINLEVEL_OUTOF10: 4

## 2023-07-06 ASSESSMENT — PAIN DESCRIPTION - LOCATION
LOCATION: ABDOMEN
LOCATION: ABDOMEN
LOCATION: CHEST
LOCATION: ABDOMEN

## 2023-07-06 ASSESSMENT — PAIN - FUNCTIONAL ASSESSMENT: PAIN_FUNCTIONAL_ASSESSMENT: 0-10

## 2023-07-06 ASSESSMENT — PAIN DESCRIPTION - ORIENTATION
ORIENTATION: MID
ORIENTATION: UPPER
ORIENTATION: UPPER;LEFT
ORIENTATION: ANTERIOR

## 2023-07-07 ENCOUNTER — APPOINTMENT (OUTPATIENT)
Facility: HOSPITAL | Age: 38
DRG: 621 | End: 2023-07-07
Attending: SPECIALIST
Payer: COMMERCIAL

## 2023-07-07 PROCEDURE — A4216 STERILE WATER/SALINE, 10 ML: HCPCS | Performed by: SPECIALIST

## 2023-07-07 PROCEDURE — 6370000000 HC RX 637 (ALT 250 FOR IP): Performed by: SPECIALIST

## 2023-07-07 PROCEDURE — 74240 X-RAY XM UPR GI TRC 1CNTRST: CPT | Performed by: SPECIALIST

## 2023-07-07 PROCEDURE — 6360000004 HC RX CONTRAST MEDICATION: Performed by: SPECIALIST

## 2023-07-07 PROCEDURE — 2500000003 HC RX 250 WO HCPCS: Performed by: SPECIALIST

## 2023-07-07 PROCEDURE — 1100000000 HC RM PRIVATE

## 2023-07-07 PROCEDURE — C9113 INJ PANTOPRAZOLE SODIUM, VIA: HCPCS | Performed by: SPECIALIST

## 2023-07-07 PROCEDURE — 6360000002 HC RX W HCPCS: Performed by: SPECIALIST

## 2023-07-07 PROCEDURE — 74240 X-RAY XM UPR GI TRC 1CNTRST: CPT

## 2023-07-07 PROCEDURE — 2580000003 HC RX 258: Performed by: SPECIALIST

## 2023-07-07 RX ORDER — METOCLOPRAMIDE 10 MG/1
10 TABLET ORAL
Status: DISCONTINUED | OUTPATIENT
Start: 2023-07-07 | End: 2023-07-07

## 2023-07-07 RX ORDER — ENOXAPARIN SODIUM 100 MG/ML
INJECTION SUBCUTANEOUS
Qty: 20 EACH | Refills: 0 | Status: SHIPPED | OUTPATIENT
Start: 2023-07-07 | End: 2023-07-17

## 2023-07-07 RX ORDER — OXYCODONE HYDROCHLORIDE AND ACETAMINOPHEN 5; 325 MG/1; MG/1
1 TABLET ORAL EVERY 4 HOURS PRN
Status: DISCONTINUED | OUTPATIENT
Start: 2023-07-07 | End: 2023-07-08

## 2023-07-07 RX ORDER — SCOLOPAMINE TRANSDERMAL SYSTEM 1 MG/1
1 PATCH, EXTENDED RELEASE TRANSDERMAL
Status: DISCONTINUED | OUTPATIENT
Start: 2023-07-07 | End: 2023-07-08

## 2023-07-07 RX ORDER — ONDANSETRON 4 MG/1
8 TABLET, ORALLY DISINTEGRATING ORAL EVERY 8 HOURS PRN
Status: DISCONTINUED | OUTPATIENT
Start: 2023-07-07 | End: 2023-07-07

## 2023-07-07 RX ADMIN — DIATRIZOATE MEGLUMINE AND DIATRIZOATE SODIUM 10 ML: 660; 100 LIQUID ORAL; RECTAL at 07:57

## 2023-07-07 RX ADMIN — CEFAZOLIN 2000 MG: 10 INJECTION, POWDER, FOR SOLUTION INTRAVENOUS at 00:49

## 2023-07-07 RX ADMIN — METOCLOPRAMIDE 10 MG: 5 INJECTION, SOLUTION INTRAMUSCULAR; INTRAVENOUS at 00:50

## 2023-07-07 RX ADMIN — METOCLOPRAMIDE 10 MG: 5 INJECTION, SOLUTION INTRAMUSCULAR; INTRAVENOUS at 06:26

## 2023-07-07 RX ADMIN — Medication 12.5 MG: at 15:02

## 2023-07-07 RX ADMIN — SODIUM CHLORIDE, POTASSIUM CHLORIDE, SODIUM LACTATE AND CALCIUM CHLORIDE: 600; 310; 30; 20 INJECTION, SOLUTION INTRAVENOUS at 10:47

## 2023-07-07 RX ADMIN — ENOXAPARIN SODIUM 40 MG: 100 INJECTION SUBCUTANEOUS at 18:53

## 2023-07-07 RX ADMIN — ONDANSETRON 4 MG: 2 INJECTION INTRAMUSCULAR; INTRAVENOUS at 10:41

## 2023-07-07 RX ADMIN — OXYCODONE AND ACETAMINOPHEN 1 TABLET: 5; 325 TABLET ORAL at 17:11

## 2023-07-07 RX ADMIN — PANTOPRAZOLE SODIUM 40 MG: 40 INJECTION, POWDER, FOR SOLUTION INTRAVENOUS at 18:54

## 2023-07-07 RX ADMIN — ENOXAPARIN SODIUM 40 MG: 100 INJECTION SUBCUTANEOUS at 06:15

## 2023-07-07 RX ADMIN — CEFAZOLIN 2000 MG: 10 INJECTION, POWDER, FOR SOLUTION INTRAVENOUS at 10:41

## 2023-07-07 RX ADMIN — MORPHINE SULFATE 6 MG: 10 INJECTION INTRAVENOUS at 00:49

## 2023-07-07 RX ADMIN — BARIUM SULFATE 20 ML: 960 POWDER, FOR SUSPENSION ORAL at 07:56

## 2023-07-07 RX ADMIN — SODIUM CHLORIDE, PRESERVATIVE FREE 10 ML: 5 INJECTION INTRAVENOUS at 22:31

## 2023-07-07 ASSESSMENT — PAIN SCALES - GENERAL
PAINLEVEL_OUTOF10: 3
PAINLEVEL_OUTOF10: 7
PAINLEVEL_OUTOF10: 3
PAINLEVEL_OUTOF10: 6
PAINLEVEL_OUTOF10: 7

## 2023-07-07 ASSESSMENT — PAIN DESCRIPTION - LOCATION
LOCATION: ABDOMEN

## 2023-07-07 ASSESSMENT — PAIN DESCRIPTION - ORIENTATION
ORIENTATION: MID

## 2023-07-07 ASSESSMENT — PAIN DESCRIPTION - DESCRIPTORS
DESCRIPTORS: ACHING
DESCRIPTORS: DISCOMFORT
DESCRIPTORS: ACHING;DISCOMFORT
DESCRIPTORS: ACHING

## 2023-07-08 ENCOUNTER — APPOINTMENT (OUTPATIENT)
Facility: HOSPITAL | Age: 38
DRG: 621 | End: 2023-07-08
Attending: SPECIALIST
Payer: COMMERCIAL

## 2023-07-08 LAB
ALBUMIN SERPL-MCNC: 2.7 G/DL (ref 3.4–5)
ALBUMIN/GLOB SERPL: 0.7 (ref 0.8–1.7)
ALP SERPL-CCNC: 60 U/L (ref 45–117)
ALT SERPL-CCNC: 19 U/L (ref 13–56)
ANION GAP SERPL CALC-SCNC: 4 MMOL/L (ref 3–18)
AST SERPL-CCNC: 10 U/L (ref 10–38)
BASOPHILS # BLD: 0 K/UL (ref 0–0.1)
BASOPHILS NFR BLD: 0 % (ref 0–2)
BILIRUB SERPL-MCNC: 0.4 MG/DL (ref 0.2–1)
BUN SERPL-MCNC: 6 MG/DL (ref 7–18)
BUN/CREAT SERPL: 11 (ref 12–20)
CALCIUM SERPL-MCNC: 8.6 MG/DL (ref 8.5–10.1)
CHLORIDE SERPL-SCNC: 103 MMOL/L (ref 100–111)
CO2 SERPL-SCNC: 30 MMOL/L (ref 21–32)
CREAT SERPL-MCNC: 0.56 MG/DL (ref 0.6–1.3)
DIFFERENTIAL METHOD BLD: ABNORMAL
EOSINOPHIL # BLD: 0 K/UL (ref 0–0.4)
EOSINOPHIL NFR BLD: 0 % (ref 0–5)
ERYTHROCYTE [DISTWIDTH] IN BLOOD BY AUTOMATED COUNT: 13.1 % (ref 11.6–14.5)
GLOBULIN SER CALC-MCNC: 4.1 G/DL (ref 2–4)
GLUCOSE SERPL-MCNC: 91 MG/DL (ref 74–99)
HCT VFR BLD AUTO: 31.6 % (ref 35–45)
HGB BLD-MCNC: 10.6 G/DL (ref 12–16)
IMM GRANULOCYTES # BLD AUTO: 0 K/UL (ref 0–0.04)
IMM GRANULOCYTES NFR BLD AUTO: 0 % (ref 0–0.5)
LYMPHOCYTES # BLD: 1.4 K/UL (ref 0.9–3.6)
LYMPHOCYTES NFR BLD: 17 % (ref 21–52)
MAGNESIUM SERPL-MCNC: 1.8 MG/DL (ref 1.6–2.6)
MCH RBC QN AUTO: 28.2 PG (ref 24–34)
MCHC RBC AUTO-ENTMCNC: 33.5 G/DL (ref 31–37)
MCV RBC AUTO: 84 FL (ref 78–100)
MONOCYTES # BLD: 0.7 K/UL (ref 0.05–1.2)
MONOCYTES NFR BLD: 9 % (ref 3–10)
NEUTS SEG # BLD: 5.9 K/UL (ref 1.8–8)
NEUTS SEG NFR BLD: 74 % (ref 40–73)
NRBC # BLD: 0 K/UL (ref 0–0.01)
NRBC BLD-RTO: 0 PER 100 WBC
PLATELET # BLD AUTO: 268 K/UL (ref 135–420)
PMV BLD AUTO: 10.2 FL (ref 9.2–11.8)
POTASSIUM SERPL-SCNC: 3.3 MMOL/L (ref 3.5–5.5)
PROT SERPL-MCNC: 6.8 G/DL (ref 6.4–8.2)
RBC # BLD AUTO: 3.76 M/UL (ref 4.2–5.3)
SODIUM SERPL-SCNC: 137 MMOL/L (ref 136–145)
WBC # BLD AUTO: 8 K/UL (ref 4.6–13.2)

## 2023-07-08 PROCEDURE — 2580000003 HC RX 258: Performed by: SPECIALIST

## 2023-07-08 PROCEDURE — 85025 COMPLETE CBC W/AUTO DIFF WBC: CPT

## 2023-07-08 PROCEDURE — 6360000002 HC RX W HCPCS: Performed by: SPECIALIST

## 2023-07-08 PROCEDURE — C9113 INJ PANTOPRAZOLE SODIUM, VIA: HCPCS | Performed by: SPECIALIST

## 2023-07-08 PROCEDURE — 80053 COMPREHEN METABOLIC PANEL: CPT

## 2023-07-08 PROCEDURE — 36415 COLL VENOUS BLD VENIPUNCTURE: CPT

## 2023-07-08 PROCEDURE — A4216 STERILE WATER/SALINE, 10 ML: HCPCS | Performed by: SPECIALIST

## 2023-07-08 PROCEDURE — 83735 ASSAY OF MAGNESIUM: CPT

## 2023-07-08 PROCEDURE — 74018 RADEX ABDOMEN 1 VIEW: CPT

## 2023-07-08 PROCEDURE — 1100000000 HC RM PRIVATE

## 2023-07-08 RX ORDER — MAGNESIUM SULFATE IN WATER 40 MG/ML
2000 INJECTION, SOLUTION INTRAVENOUS ONCE
Status: COMPLETED | OUTPATIENT
Start: 2023-07-08 | End: 2023-07-08

## 2023-07-08 RX ORDER — POTASSIUM CHLORIDE 7.45 MG/ML
10 INJECTION INTRAVENOUS
Status: ACTIVE | OUTPATIENT
Start: 2023-07-08 | End: 2023-07-08

## 2023-07-08 RX ORDER — METOCLOPRAMIDE HYDROCHLORIDE 5 MG/ML
10 INJECTION INTRAMUSCULAR; INTRAVENOUS EVERY 6 HOURS
Status: DISCONTINUED | OUTPATIENT
Start: 2023-07-08 | End: 2023-07-09 | Stop reason: HOSPADM

## 2023-07-08 RX ORDER — DEXTROSE, SODIUM CHLORIDE, SODIUM LACTATE, POTASSIUM CHLORIDE, AND CALCIUM CHLORIDE 5; .6; .31; .03; .02 G/100ML; G/100ML; G/100ML; G/100ML; G/100ML
INJECTION, SOLUTION INTRAVENOUS CONTINUOUS
Status: DISCONTINUED | OUTPATIENT
Start: 2023-07-08 | End: 2023-07-09 | Stop reason: HOSPADM

## 2023-07-08 RX ORDER — ENOXAPARIN SODIUM 100 MG/ML
40 INJECTION SUBCUTANEOUS EVERY 12 HOURS SCHEDULED
Status: DISCONTINUED | OUTPATIENT
Start: 2023-07-08 | End: 2023-07-09 | Stop reason: HOSPADM

## 2023-07-08 RX ORDER — HYDROMORPHONE HYDROCHLORIDE 2 MG/1
2 TABLET ORAL EVERY 4 HOURS PRN
Status: DISCONTINUED | OUTPATIENT
Start: 2023-07-08 | End: 2023-07-09 | Stop reason: HOSPADM

## 2023-07-08 RX ADMIN — METOCLOPRAMIDE 10 MG: 5 INJECTION, SOLUTION INTRAMUSCULAR; INTRAVENOUS at 22:12

## 2023-07-08 RX ADMIN — POTASSIUM CHLORIDE 10 MEQ: 7.46 INJECTION, SOLUTION INTRAVENOUS at 16:07

## 2023-07-08 RX ADMIN — ONDANSETRON 4 MG: 2 INJECTION INTRAMUSCULAR; INTRAVENOUS at 06:20

## 2023-07-08 RX ADMIN — POTASSIUM CHLORIDE 10 MEQ: 7.46 INJECTION, SOLUTION INTRAVENOUS at 14:27

## 2023-07-08 RX ADMIN — ENOXAPARIN SODIUM 40 MG: 100 INJECTION SUBCUTANEOUS at 22:13

## 2023-07-08 RX ADMIN — SODIUM CHLORIDE, POTASSIUM CHLORIDE, SODIUM LACTATE AND CALCIUM CHLORIDE: 600; 310; 30; 20 INJECTION, SOLUTION INTRAVENOUS at 06:19

## 2023-07-08 RX ADMIN — PANTOPRAZOLE SODIUM 40 MG: 40 INJECTION, POWDER, FOR SOLUTION INTRAVENOUS at 17:18

## 2023-07-08 RX ADMIN — SODIUM CHLORIDE, SODIUM LACTATE, POTASSIUM CHLORIDE, CALCIUM CHLORIDE AND DEXTROSE MONOHYDRATE: 5; 600; 310; 30; 20 INJECTION, SOLUTION INTRAVENOUS at 11:26

## 2023-07-08 RX ADMIN — MAGNESIUM SULFATE HEPTAHYDRATE 2000 MG: 40 INJECTION, SOLUTION INTRAVENOUS at 13:12

## 2023-07-08 RX ADMIN — METOCLOPRAMIDE 10 MG: 5 INJECTION, SOLUTION INTRAMUSCULAR; INTRAVENOUS at 11:20

## 2023-07-08 RX ADMIN — ONDANSETRON 4 MG: 2 INJECTION INTRAMUSCULAR; INTRAVENOUS at 00:02

## 2023-07-08 RX ADMIN — SODIUM CHLORIDE, POTASSIUM CHLORIDE, SODIUM LACTATE AND CALCIUM CHLORIDE: 600; 310; 30; 20 INJECTION, SOLUTION INTRAVENOUS at 00:01

## 2023-07-08 RX ADMIN — METOCLOPRAMIDE 10 MG: 5 INJECTION, SOLUTION INTRAMUSCULAR; INTRAVENOUS at 17:18

## 2023-07-08 RX ADMIN — ENOXAPARIN SODIUM 40 MG: 100 INJECTION SUBCUTANEOUS at 06:30

## 2023-07-08 RX ADMIN — SODIUM CHLORIDE, PRESERVATIVE FREE 10 ML: 5 INJECTION INTRAVENOUS at 22:14

## 2023-07-08 ASSESSMENT — PAIN DESCRIPTION - LOCATION
LOCATION: ABDOMEN
LOCATION: ABDOMEN

## 2023-07-08 ASSESSMENT — PAIN SCALES - GENERAL
PAINLEVEL_OUTOF10: 3
PAINLEVEL_OUTOF10: 3

## 2023-07-08 ASSESSMENT — PAIN DESCRIPTION - ORIENTATION: ORIENTATION: MID

## 2023-07-09 VITALS
HEIGHT: 65 IN | TEMPERATURE: 98.6 F | WEIGHT: 258 LBS | BODY MASS INDEX: 42.99 KG/M2 | OXYGEN SATURATION: 97 % | SYSTOLIC BLOOD PRESSURE: 131 MMHG | DIASTOLIC BLOOD PRESSURE: 81 MMHG | RESPIRATION RATE: 19 BRPM | HEART RATE: 90 BPM

## 2023-07-09 PROCEDURE — 6360000002 HC RX W HCPCS: Performed by: SPECIALIST

## 2023-07-09 PROCEDURE — 2580000003 HC RX 258: Performed by: SPECIALIST

## 2023-07-09 RX ADMIN — SODIUM CHLORIDE, PRESERVATIVE FREE 10 ML: 5 INJECTION INTRAVENOUS at 11:15

## 2023-07-09 RX ADMIN — ENOXAPARIN SODIUM 40 MG: 100 INJECTION SUBCUTANEOUS at 11:07

## 2023-07-09 RX ADMIN — SODIUM CHLORIDE, SODIUM LACTATE, POTASSIUM CHLORIDE, CALCIUM CHLORIDE AND DEXTROSE MONOHYDRATE: 5; 600; 310; 30; 20 INJECTION, SOLUTION INTRAVENOUS at 00:42

## 2023-07-09 RX ADMIN — METOCLOPRAMIDE 10 MG: 5 INJECTION, SOLUTION INTRAMUSCULAR; INTRAVENOUS at 05:24

## 2023-07-09 RX ADMIN — METOCLOPRAMIDE 10 MG: 5 INJECTION, SOLUTION INTRAMUSCULAR; INTRAVENOUS at 11:08

## 2023-07-10 ENCOUNTER — TELEPHONE (OUTPATIENT)
Age: 38
End: 2023-07-10

## 2023-07-10 NOTE — TELEPHONE ENCOUNTER
This RN spoke with patient post-operatively. Hydration: Patient hydrated with 24 ounces thus far today. Nausea and/or vomitting: None    Pain: Currently managed with Gas X    Lovenox injections: Administering every 12 hours, rotating sites. RN reminded patient to complete all injections, in which patient verbalized understanding. Lap sites: No erythema, drainage, and/or swelling    AVIVA drain site: No drainage; dressing removed    BM: Daily    Ambulation: Patient is walking throughout house every hour. IS: Patient continues to use 10x's per hour while awake. She has reached 2,000 mL. Temperature: 97.5 degrees    Medications: (confirmed currently taking)   *Multi-vitamin: yes   *Probiotic: yes   *Prilosec: yes    Questions: None    This RN reminded the patient to contact the office with any questions and/or concerns. RN also reminded patient they will receive another follow-up TC prior to the two week post-op follow-up appointment. Patient verbalized understanding to both. Patient's two week post-op visit is scheduled and was confirmed.

## 2023-07-13 ENCOUNTER — NURSE ONLY (OUTPATIENT)
Age: 38
End: 2023-07-13

## 2023-07-13 VITALS
HEIGHT: 65 IN | DIASTOLIC BLOOD PRESSURE: 78 MMHG | WEIGHT: 254.2 LBS | SYSTOLIC BLOOD PRESSURE: 125 MMHG | HEART RATE: 81 BPM | OXYGEN SATURATION: 100 % | BODY MASS INDEX: 42.35 KG/M2 | TEMPERATURE: 97.7 F

## 2023-07-13 NOTE — PROGRESS NOTES
Patient hydrated with 65 ounces yesterday. RN removed two sutures from LLQ of abdomen. Incision well approximated and healing. Patient tolerated well. Remaining incisions with steri-strips, which RN instructed patient to remove today. She verbalized understanding. RN palpated areas and no concerns noted.

## 2023-07-18 ENCOUNTER — HOSPITAL ENCOUNTER (OUTPATIENT)
Facility: HOSPITAL | Age: 38
Discharge: HOME | End: 2023-07-21

## 2023-07-18 NOTE — PROGRESS NOTES
Spoke with Meliton Blacking today. Pt is approx. 2 weeks S/P laparoscopic gastric bypass surgery. Tolerating liquid diet without difficulty. Protein shake intake: Premier/Pure Protein (3-4/d). Fluid intake: Crystal Light, Minute Maid Zero Sugar, Gatorade Zero, Propel, egg drop soup, cream of mushroom soup (strained, diluted w/ water) - + oz/d. No complaints. No readmits/surgeries. Wt: 254 lbs. (In clinic, 7/13/23)  Pre-Op Wt: 262 lbs. EBW: 112 lbs. Physical Activity: walking in neighborhood, accumulating 60 minutes daily (10 min. around block every other hour)    Supplements/Medications:  [x] Opal's Complete Chewable MVI BID  [x] Probiotic QD  [x] Prilosec QD    Pt given diet education over the phone. Reviewed diet progression for weeks 3-4. Patient appears to have a good understanding of the diet progression, appropriate food choices, and dietary/exercise habits for successful weight loss and adequate nutrition after surgery. Reviewed pp. 32-46 of the patient education book. Discussed: post-op diet progression, including soft/pureed high protein, low-fat, low-sugar food recommendations; proper food group choices;  consumption of food and liquids, and consumption of adequate no-sugar, no caffeine, no carbonation liquids. We reviewed appropriate food choices, meal adaptations (use of smaller dishes/utensils, eating slowly and chewing sufficiently for digestion), cooking techniques, and eating behavior modifications. Discussed intake regimen with 3 meals and 2-3 protein supplements per day. Additionally, meal timing - to include consuming a meal or snack every 3-4 hrs, the 30:30 rule, and having a meal within 2 hours of waking were discussed. Reinforced the importance of continued vitamin & probiotic consumption, adequate fluid intake with goal of 64 fl. oz./d, and adequate protein intake with goal of  g/d. Stressed the importance of getting 30 min.  of physical

## 2023-07-20 ENCOUNTER — OFFICE VISIT (OUTPATIENT)
Age: 38
End: 2023-07-20

## 2023-07-20 VITALS
HEIGHT: 65 IN | BODY MASS INDEX: 41.22 KG/M2 | DIASTOLIC BLOOD PRESSURE: 86 MMHG | HEART RATE: 68 BPM | SYSTOLIC BLOOD PRESSURE: 139 MMHG | WEIGHT: 247.4 LBS | OXYGEN SATURATION: 100 % | TEMPERATURE: 98.2 F

## 2023-07-20 DIAGNOSIS — Z09 POSTOPERATIVE EXAMINATION: Primary | ICD-10-CM

## 2023-07-20 PROCEDURE — 99024 POSTOP FOLLOW-UP VISIT: CPT | Performed by: NURSE PRACTITIONER

## 2023-07-20 RX ORDER — BACILLUS COAGULANS/INULIN 1B-250 MG
CAPSULE ORAL
COMMUNITY

## 2023-07-20 RX ORDER — OMEPRAZOLE 20 MG/1
20 CAPSULE, DELAYED RELEASE ORAL DAILY
COMMUNITY

## 2023-08-04 ENCOUNTER — TELEPHONE (OUTPATIENT)
Facility: HOSPITAL | Age: 38
End: 2023-08-04

## 2023-08-04 NOTE — TELEPHONE ENCOUNTER
Received a voicemail from patient requesting to call her and instruct how to get back into ketosis. Pt states, \"I messed up and listened to a lady who also had bypass\". Returned call. Patient states she had been eating Cheez-its based on misinformation from a friend and became constipated as well as being kicked out of ketosis. Reports taking Miralax 2x/d for ~2 weeks with no bowel movement. Received constipation treatment guidelines from AMANDA Chapa, few days ago but could not find Senna-S in store. Encouraged patient to check other pharmacies and seek guidance from pharmacist if having difficulty finding product, strictly follow progressive guidelines for treating constipation as provided to resolve ASAP. 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 Explained this could be dumping syndrome from eating carbohydrate-rich foods. States she had been consuming 1-2 Premier protein shakes daily but has not had any in several days because she ran out. Drinking 75+ oz fluids daily (water, Crystal Light, Minute Maid Zero Sugar). Reports has not been eating as often as she should. Soft/puree diet guidelines reviewed with patient. Instructed to avoid all carbohydrate foods, ensure she is eating 1 oz soft/puree protein 3-4x/d and at least 60 g protein from supplements daily. Ensure fluid intake of at least 64 oz/d, appropriate options reviewed. Reminded to practice 30/30 rule, take dime-size bites and chew 25-30 times, take 20-30 minutes when eating, stopping at first sign of fullness. Patient verbalized understanding of education provided. Reminded pt of her upcoming 1-month post-op call on 8/7/23. Pt has RD contact information for future nutrition-related questions/concerns. Patient to follow-up with nursing staff if constipation continues.

## 2023-08-06 ENCOUNTER — HOSPITAL ENCOUNTER (EMERGENCY)
Facility: HOSPITAL | Age: 38
Discharge: HOME | End: 2023-08-07
Attending: EMERGENCY MEDICINE
Payer: COMMERCIAL

## 2023-08-06 DIAGNOSIS — R11.2 NAUSEA AND VOMITING, UNSPECIFIED VOMITING TYPE: Primary | ICD-10-CM

## 2023-08-06 DIAGNOSIS — R10.13 ABDOMINAL PAIN, EPIGASTRIC: ICD-10-CM

## 2023-08-06 PROCEDURE — 85025 COMPLETE CBC W/AUTO DIFF WBC: CPT

## 2023-08-06 PROCEDURE — 96375 TX/PRO/DX INJ NEW DRUG ADDON: CPT

## 2023-08-06 PROCEDURE — 99284 EMERGENCY DEPT VISIT MOD MDM: CPT

## 2023-08-06 PROCEDURE — 2580000003 HC RX 258: Performed by: EMERGENCY MEDICINE

## 2023-08-06 PROCEDURE — 6360000002 HC RX W HCPCS: Performed by: EMERGENCY MEDICINE

## 2023-08-06 PROCEDURE — 96374 THER/PROPH/DIAG INJ IV PUSH: CPT

## 2023-08-06 PROCEDURE — 96361 HYDRATE IV INFUSION ADD-ON: CPT

## 2023-08-06 PROCEDURE — 93005 ELECTROCARDIOGRAM TRACING: CPT | Performed by: EMERGENCY MEDICINE

## 2023-08-06 PROCEDURE — 83690 ASSAY OF LIPASE: CPT

## 2023-08-06 PROCEDURE — 80053 COMPREHEN METABOLIC PANEL: CPT

## 2023-08-06 RX ORDER — ONDANSETRON 2 MG/ML
4 INJECTION INTRAMUSCULAR; INTRAVENOUS ONCE
Status: COMPLETED | OUTPATIENT
Start: 2023-08-07 | End: 2023-08-06

## 2023-08-06 RX ORDER — 0.9 % SODIUM CHLORIDE 0.9 %
1000 INTRAVENOUS SOLUTION INTRAVENOUS ONCE
Status: COMPLETED | OUTPATIENT
Start: 2023-08-07 | End: 2023-08-07

## 2023-08-06 RX ADMIN — ONDANSETRON 4 MG: 2 INJECTION INTRAMUSCULAR; INTRAVENOUS at 23:52

## 2023-08-06 RX ADMIN — SODIUM CHLORIDE 1000 ML: 9 INJECTION, SOLUTION INTRAVENOUS at 23:51

## 2023-08-06 ASSESSMENT — LIFESTYLE VARIABLES
HOW MANY STANDARD DRINKS CONTAINING ALCOHOL DO YOU HAVE ON A TYPICAL DAY: PATIENT DOES NOT DRINK
HOW OFTEN DO YOU HAVE A DRINK CONTAINING ALCOHOL: NEVER

## 2023-08-06 ASSESSMENT — PAIN - FUNCTIONAL ASSESSMENT: PAIN_FUNCTIONAL_ASSESSMENT: 0-10

## 2023-08-06 ASSESSMENT — PAIN DESCRIPTION - LOCATION: LOCATION: ABDOMEN

## 2023-08-06 ASSESSMENT — PAIN SCALES - GENERAL: PAINLEVEL_OUTOF10: 4

## 2023-08-07 ENCOUNTER — TELEPHONE (OUTPATIENT)
Facility: HOSPITAL | Age: 38
End: 2023-08-07

## 2023-08-07 VITALS
BODY MASS INDEX: 39.32 KG/M2 | OXYGEN SATURATION: 99 % | HEIGHT: 65 IN | DIASTOLIC BLOOD PRESSURE: 69 MMHG | WEIGHT: 236 LBS | TEMPERATURE: 98.2 F | HEART RATE: 65 BPM | SYSTOLIC BLOOD PRESSURE: 114 MMHG | RESPIRATION RATE: 16 BRPM

## 2023-08-07 LAB
ALBUMIN SERPL-MCNC: 3.5 G/DL (ref 3.4–5)
ALBUMIN/GLOB SERPL: 0.8 (ref 0.8–1.7)
ALP SERPL-CCNC: 90 U/L (ref 45–117)
ALT SERPL-CCNC: 38 U/L (ref 13–56)
ANION GAP SERPL CALC-SCNC: 8 MMOL/L (ref 3–18)
APPEARANCE UR: ABNORMAL
AST SERPL-CCNC: 20 U/L (ref 10–38)
BACTERIA URNS QL MICRO: ABNORMAL /HPF
BASOPHILS # BLD: 0 K/UL (ref 0–0.1)
BASOPHILS NFR BLD: 0 % (ref 0–2)
BILIRUB SERPL-MCNC: 0.5 MG/DL (ref 0.2–1)
BILIRUB UR QL: ABNORMAL
BUN SERPL-MCNC: 11 MG/DL (ref 7–18)
BUN/CREAT SERPL: 12 (ref 12–20)
CALCIUM SERPL-MCNC: 9.2 MG/DL (ref 8.5–10.1)
CAOX CRY URNS QL MICRO: ABNORMAL
CHLORIDE SERPL-SCNC: 106 MMOL/L (ref 100–111)
CO2 SERPL-SCNC: 25 MMOL/L (ref 21–32)
COLOR UR: ABNORMAL
CREAT SERPL-MCNC: 0.92 MG/DL (ref 0.6–1.3)
DIFFERENTIAL METHOD BLD: NORMAL
EOSINOPHIL # BLD: 0 K/UL (ref 0–0.4)
EOSINOPHIL NFR BLD: 0 % (ref 0–5)
EPITH CASTS URNS QL MICRO: ABNORMAL /LPF (ref 0–5)
ERYTHROCYTE [DISTWIDTH] IN BLOOD BY AUTOMATED COUNT: 13.2 % (ref 11.6–14.5)
GLOBULIN SER CALC-MCNC: 4.5 G/DL (ref 2–4)
GLUCOSE SERPL-MCNC: 90 MG/DL (ref 74–99)
GLUCOSE UR STRIP.AUTO-MCNC: NEGATIVE MG/DL
HCG UR QL: NEGATIVE
HCT VFR BLD AUTO: 37.8 % (ref 35–45)
HGB BLD-MCNC: 12.7 G/DL (ref 12–16)
HGB UR QL STRIP: ABNORMAL
IMM GRANULOCYTES # BLD AUTO: 0 K/UL (ref 0–0.04)
IMM GRANULOCYTES NFR BLD AUTO: 0 % (ref 0–0.5)
KETONES UR QL STRIP.AUTO: >80 MG/DL
LEUKOCYTE ESTERASE UR QL STRIP.AUTO: ABNORMAL
LIPASE SERPL-CCNC: 348 U/L (ref 73–393)
LYMPHOCYTES # BLD: 1.7 K/UL (ref 0.9–3.6)
LYMPHOCYTES NFR BLD: 38 % (ref 21–52)
MCH RBC QN AUTO: 28.2 PG (ref 24–34)
MCHC RBC AUTO-ENTMCNC: 33.6 G/DL (ref 31–37)
MCV RBC AUTO: 83.8 FL (ref 78–100)
MONOCYTES # BLD: 0.4 K/UL (ref 0.05–1.2)
MONOCYTES NFR BLD: 9 % (ref 3–10)
MUCOUS THREADS URNS QL MICRO: ABNORMAL /LPF
NEUTS SEG # BLD: 2.4 K/UL (ref 1.8–8)
NEUTS SEG NFR BLD: 52 % (ref 40–73)
NITRITE UR QL STRIP.AUTO: NEGATIVE
NRBC # BLD: 0 K/UL (ref 0–0.01)
NRBC BLD-RTO: 0 PER 100 WBC
PH UR STRIP: 5.5 (ref 5–8)
PLATELET # BLD AUTO: 299 K/UL (ref 135–420)
PMV BLD AUTO: 10.5 FL (ref 9.2–11.8)
POTASSIUM SERPL-SCNC: 3.3 MMOL/L (ref 3.5–5.5)
PROT SERPL-MCNC: 8 G/DL (ref 6.4–8.2)
PROT UR STRIP-MCNC: 100 MG/DL
RBC # BLD AUTO: 4.51 M/UL (ref 4.2–5.3)
RBC #/AREA URNS HPF: ABNORMAL /HPF (ref 0–5)
SODIUM SERPL-SCNC: 139 MMOL/L (ref 136–145)
SP GR UR REFRACTOMETRY: >1.03 (ref 1–1.03)
UROBILINOGEN UR QL STRIP.AUTO: 1 EU/DL (ref 0.2–1)
WBC # BLD AUTO: 4.6 K/UL (ref 4.6–13.2)
WBC URNS QL MICRO: ABNORMAL /HPF (ref 0–4)

## 2023-08-07 PROCEDURE — 6370000000 HC RX 637 (ALT 250 FOR IP): Performed by: EMERGENCY MEDICINE

## 2023-08-07 PROCEDURE — 6360000002 HC RX W HCPCS: Performed by: EMERGENCY MEDICINE

## 2023-08-07 PROCEDURE — 81025 URINE PREGNANCY TEST: CPT

## 2023-08-07 PROCEDURE — 81001 URINALYSIS AUTO W/SCOPE: CPT

## 2023-08-07 RX ORDER — ONDANSETRON 4 MG/1
4 TABLET, ORALLY DISINTEGRATING ORAL EVERY 8 HOURS PRN
Qty: 20 TABLET | Refills: 0 | Status: SHIPPED | OUTPATIENT
Start: 2023-08-07

## 2023-08-07 RX ORDER — SUCRALFATE ORAL 1 G/10ML
1 SUSPENSION ORAL 4 TIMES DAILY PRN
Qty: 280 ML | Refills: 0 | Status: SHIPPED | OUTPATIENT
Start: 2023-08-07 | End: 2023-08-14

## 2023-08-07 RX ORDER — MORPHINE SULFATE 4 MG/ML
4 INJECTION, SOLUTION INTRAMUSCULAR; INTRAVENOUS
Status: COMPLETED | OUTPATIENT
Start: 2023-08-07 | End: 2023-08-07

## 2023-08-07 RX ADMIN — MORPHINE SULFATE 4 MG: 4 INJECTION, SOLUTION INTRAMUSCULAR; INTRAVENOUS at 01:01

## 2023-08-07 RX ADMIN — ALUMINUM HYDROXIDE, MAGNESIUM HYDROXIDE, AND SIMETHICONE 40 ML: 200; 200; 20 SUSPENSION ORAL at 00:10

## 2023-08-07 NOTE — ED NOTES
Discharge instructions reviewed with patient. Patient verbalized understanding. Patient advised to follow up as directed on discharge instructions. Patient denies questions, needs or concerns at this time. Patient verbalized understanding. No s/sx of distress noted.        Rick Dickinson RN  08/07/23 0228

## 2023-08-07 NOTE — ED TRIAGE NOTES
Gastric bypass July 6th, has been feeling \"bad\" since. Today vomited 2x at work and was sent to the ED. Reports nausea, vomiting, lightheadedness on standing and spinning sensation with movement, constipation.  Last BM yesterday after taking Miralax and Bunk Foss S.

## 2023-08-07 NOTE — ED NOTES
Patient resting comfortably on the stretcher with call bell in reach. Patient has no signs or symptoms of acute distress at this time. Patient has no concerns or questions about their treatment plan.       Dunia Hamm RN  08/07/23 0120

## 2023-08-07 NOTE — TELEPHONE ENCOUNTER
8/7/2023:  Contacted patient today at 2:20 PM  in reference to: 1-mo. phone call to assess post-op diet tolerance and discuss diet progression. Patient was left a voicemail instructing them to return call. My contact information was provided.     LINDEN BOYD RD

## 2023-08-07 NOTE — ED PROVIDER NOTES
EMERGENCY DEPARTMENT HISTORY AND PHYSICAL EXAM      Date: 8/6/2023  Patient Name: Morgan Herrera      History of Presenting Illness     Chief Complaint   Patient presents with    Dizziness    Nausea    Emesis    Constipation       Location/Duration/Severity/Modifying factors   Chief Complaint   Patient presents with    Dizziness    Nausea    Emesis    Constipation       HPI:  Morgan Herrera is a 41 y.o. female with PMH significant for gastric bypass surgery July 6 presents with days of nausea, vomiting, upper abdominal discomfort, cramping pain. Decreased stool frequency but passing gas. Abdomen small stools earlier today. No fevers, chest pain or shortness of breath, no early postoperative issues. Has not had a consistent bowel movement since the surgery. The  patient has already lost 22 pounds. Not anything today for symptom relief. Patient is also status post cholecystectomy.     PCP: Yumiko Curiel DNP    Current Facility-Administered Medications   Medication Dose Route Frequency Provider Last Rate Last Admin    sodium chloride 0.9 % bolus 1,000 mL  1,000 mL IntraVENous Once [facility], .9 mL/hr at 08/06/23 2351 1,000 mL at 08/06/23 2351     Current Outpatient Medications   Medication Sig Dispense Refill    ondansetron (ZOFRAN-ODT) 4 MG disintegrating tablet Take 1 tablet by mouth every 8 hours as needed for Nausea or Vomiting 20 tablet 0    sucralfate (CARAFATE) 1 GM/10ML suspension Take 10 mLs by mouth 4 times daily as needed (For upper abdominal pain) 280 mL 0    Pediatric Multivitamins-Iron (FLINTSTONES COMPLETE PO) Take by mouth      Bacillus Coagulans-Inulin (PROBIOTIC) 1-250 BILLION-MG CAPS Take by mouth      omeprazole (PRILOSEC) 20 MG delayed release capsule Take 1 capsule by mouth daily         Past History     Past Medical History:  Past Medical History:   Diagnosis Date    Anal fissure     Chronic pain     Dyslipidemia 04/25/2022    Exercise tolerance finding

## 2023-08-08 ENCOUNTER — HOSPITAL ENCOUNTER (OUTPATIENT)
Facility: HOSPITAL | Age: 38
Discharge: HOME | End: 2023-08-11

## 2023-08-08 VITALS — BODY MASS INDEX: 39.32 KG/M2 | WEIGHT: 236 LBS | HEIGHT: 65 IN

## 2023-08-08 LAB
EKG ATRIAL RATE: 84 BPM
EKG DIAGNOSIS: NORMAL
EKG P AXIS: 49 DEGREES
EKG P-R INTERVAL: 176 MS
EKG Q-T INTERVAL: 368 MS
EKG QRS DURATION: 92 MS
EKG QTC CALCULATION (BAZETT): 434 MS
EKG R AXIS: 18 DEGREES
EKG T AXIS: 15 DEGREES
EKG VENTRICULAR RATE: 84 BPM

## 2023-08-08 PROCEDURE — 93010 ELECTROCARDIOGRAM REPORT: CPT | Performed by: INTERNAL MEDICINE

## 2023-08-16 ENCOUNTER — OFFICE VISIT (OUTPATIENT)
Age: 38
End: 2023-08-16

## 2023-08-16 VITALS
BODY MASS INDEX: 39 KG/M2 | HEART RATE: 69 BPM | OXYGEN SATURATION: 100 % | TEMPERATURE: 97.3 F | WEIGHT: 234.1 LBS | SYSTOLIC BLOOD PRESSURE: 121 MMHG | HEIGHT: 65 IN | DIASTOLIC BLOOD PRESSURE: 70 MMHG

## 2023-08-16 DIAGNOSIS — K90.9 INTESTINAL MALABSORPTION, UNSPECIFIED TYPE: Primary | ICD-10-CM

## 2023-08-16 PROCEDURE — 99024 POSTOP FOLLOW-UP VISIT: CPT | Performed by: SPECIALIST

## 2023-08-16 ASSESSMENT — PATIENT HEALTH QUESTIONNAIRE - PHQ9
SUM OF ALL RESPONSES TO PHQ QUESTIONS 1-9: 0
SUM OF ALL RESPONSES TO PHQ9 QUESTIONS 1 & 2: 0
1. LITTLE INTEREST OR PLEASURE IN DOING THINGS: 0
SUM OF ALL RESPONSES TO PHQ QUESTIONS 1-9: 0
SUM OF ALL RESPONSES TO PHQ QUESTIONS 1-9: 0
2. FEELING DOWN, DEPRESSED OR HOPELESS: 0
SUM OF ALL RESPONSES TO PHQ QUESTIONS 1-9: 0

## 2023-09-11 ENCOUNTER — HOSPITAL ENCOUNTER (EMERGENCY)
Facility: HOSPITAL | Age: 38
Discharge: HOME | End: 2023-09-11
Attending: EMERGENCY MEDICINE
Payer: COMMERCIAL

## 2023-09-11 VITALS
HEART RATE: 65 BPM | RESPIRATION RATE: 16 BRPM | BODY MASS INDEX: 37.82 KG/M2 | HEIGHT: 65 IN | OXYGEN SATURATION: 100 % | SYSTOLIC BLOOD PRESSURE: 110 MMHG | TEMPERATURE: 98.1 F | WEIGHT: 227 LBS | DIASTOLIC BLOOD PRESSURE: 58 MMHG

## 2023-09-11 DIAGNOSIS — S69.92XA INJURY OF NAIL BED OF FINGER OF LEFT HAND, INITIAL ENCOUNTER: Primary | ICD-10-CM

## 2023-09-11 PROCEDURE — 99282 EMERGENCY DEPT VISIT SF MDM: CPT

## 2023-09-11 ASSESSMENT — PAIN SCALES - GENERAL: PAINLEVEL_OUTOF10: 9

## 2023-09-11 ASSESSMENT — PAIN - FUNCTIONAL ASSESSMENT: PAIN_FUNCTIONAL_ASSESSMENT: 0-10

## 2023-09-11 NOTE — ED PROVIDER NOTES
[facility] EMERGENCY DEPT  EMERGENCY DEPARTMENT ENCOUNTER      Pt Name: Arlene Remy  MRN: [id number]  [address] [facility] 1985  Date of evaluation: 9/11/2023  Provider: Sven Alves MD    [address]       Chief Complaint   Patient presents with    fingernail         HISTORY OF PRESENT ILLNESS   (Location/Symptom, Timing/Onset, Context/Setting, Quality, Duration, Modifying Factors, Severity)  Note limiting factors. Arlene Remy is a 41 y.o. female who presents to the emergency department     -year-old female with no past medical history presents the emergency department with fingernail injury. Patient states she was at work tonight and hit her nail on a hard object. Her nail came off. If the left hand third digit. Patient has no active bleeding mild pain movement makes it worse. No history of similar episode. No other issues expressed. The history is provided by medical records and the patient. No  was used. Nursing Notes were reviewed. REVIEW OF SYSTEMS    (2-9 systems for level 4, 10 or more for level 5)     Review of Systems   Skin:  Positive for wound. All other systems reviewed and are negative. Except as noted above the remainder of the review of systems was reviewed and negative. PAST MEDICAL HISTORY     Past Medical History:   Diagnosis Date    Anal fissure     Chronic pain     Dyslipidemia 04/25/2022    Exercise tolerance finding 05/01/2023    Probably not able to go up and down two flights of stairs without SOB but no chest pain. Able to walk a mile without SOB or chest pain on level ground    GERD (gastroesophageal reflux disease)     avoids food triggers, ginger ale TUMS    Morbid (severe) obesity due to excess calories (HCC)     Morbid obesity with body mass index (BMI) of 40.0 to 49.9 ([address])     Primary hypertension 09/29/2022    Refraction disorder     Supposed to wear glasses but does not.      Vitamin D deficiency 04/25/2022         SURGICAL

## 2023-09-11 NOTE — ED TRIAGE NOTES
Patient arrived to ER with complaints of broken fingernail, acrylic nail is broken with her nail detached from skin. Injury occurred yesterday night.

## 2023-09-11 NOTE — ED NOTES
Discharge instructions given to patient. Follow up information provided. Verbalized understanding.       Miriam Gruber, STAN  09/11/23 [id number]

## 2023-09-14 ENCOUNTER — HOSPITAL ENCOUNTER (OUTPATIENT)
Facility: HOSPITAL | Age: 38
Discharge: HOME | End: 2023-09-17

## 2023-09-14 NOTE — PROGRESS NOTES
Patient is a 41 y.o. yo female approx. 2 mo S/P laparoscopic gastric bypass procedure. Patient-Reported Vitals  Pt current weight stated, visit was virtual.     Current Wt: 227.6 lbs  Pre-op Wt: 262 lbs  EBW: 112 lbs    Pt has 34 lbs since surgery on 7/6/23. Pt has lost 31% EBW. Pt states she has gotten off track with diet due to feelings of loneliness and depression when she is home alone and often turns to food as a coping mechanism. She also reports giving into peer pressure from friends to \"just eat a little\", as well as following incorrect diet advice from acquaintances who have had bariatric surgery. Reports employer has luncheons every other week and she will pack her lunch but choose instead to eat pizza or other high carb/fat foods that are served. Pt is currently on a bariatric regular diet without difficulty. Patient is hydrating with 50-80+ ounces water/Crystal Light/Zero sugar electrolyte drinks, daily. Patient is tolerating the following sources of protein:  2-4 Premier/Pure protein supplement shakes/day; scrambled eggs w/ low-fat cheese, chicken salad, shrimp, salmon, ground turkey. Pt is tolerating the following non-starchy vegetables: broccoli. Pt is eating 1-2 meals/d. Portion sizes are 1.5 oz protein/meal and 0-0.5 oz soft non-starchy vegetables/meal.  Pt is consuming her meals in ~30 min. Patient is exercising 3d/wk. Using elliptical 30 min/d, also does strengthening exercises for ~1hr/d    Supplements:  [x] Tati's Complete MVI  BID  [x] Calcium citrate: 200 mg TID - instructed to increase to 500-600 mg TID  [x]Vitamin B-50 complex: follow directions on supplement  [x]Vitamin B12: 1,000 mcg daily, taken sublingually   [x]Vitamin D3: 3,000 IU per day    Reviewed the following with patient:  Continue with regular diet. Reminded to measure portions, continue high protein, low carbohydrate diet. Reminded to eat regularly, to eat slowly & not to drink with meals.   Refer to

## 2023-09-27 ENCOUNTER — OFFICE VISIT (OUTPATIENT)
Age: 38
End: 2023-09-27

## 2023-09-27 VITALS — HEIGHT: 65 IN | BODY MASS INDEX: 37.15 KG/M2 | WEIGHT: 223 LBS | TEMPERATURE: 98.7 F

## 2023-09-27 DIAGNOSIS — S61.305A AVULSION OF NAIL OF LEFT RING FINGER: Primary | ICD-10-CM

## 2023-09-27 NOTE — PROGRESS NOTES
Gely Esteban is a 41 y.o. female right handed employed. Worker's Compensation and legal considerations: none    Chief Complaint   Patient presents with    Hand Pain     Left ring finger       Pain Score:   0 - No pain    HPI: Patient presents today for ER follow-up after having her left ring finger nail plate lifted off of the digit. Date of onset: Uncertain  Injury: Yes: Comment: Left ring finger  Prior Treatment:  No    ROS: Review of Systems - General ROS: negative except HPI    Past Medical History:   Diagnosis Date    Anal fissure     Chronic pain     Dyslipidemia 04/25/2022    Exercise tolerance finding 05/01/2023    Probably not able to go up and down two flights of stairs without SOB but no chest pain. Able to walk a mile without SOB or chest pain on level ground    GERD (gastroesophageal reflux disease)     avoids food triggers, ginger ale TUMS    Morbid (severe) obesity due to excess calories (HCC)     Morbid obesity with body mass index (BMI) of 40.0 to 49.9 ([address])     Primary hypertension 09/29/2022    Refraction disorder     Supposed to wear glasses but does not. Vitamin D deficiency 04/25/2022       Past Surgical History:   Procedure Laterality Date    CHOLECYSTECTOMY, LAPAROSCOPIC N/A 5/18/2023    LAPAROSCOPIC CHOLECYSTECTOMY INTEPERATIVE CHOLANGIOGRAM WITH C-ARM performed by Christiano Haynes MD at [address] [place] [address] N/A 3/12/2021    SIGMOIDOSCOPY FLEXIBLE with bxs performed by Jeremy Fallon MD at [address] [place] [address] GASTRIC BYPASS N/A 7/6/2023    1. LAPAROSCOPIC GASTRIC BYPASS 2. HIATAL HERNIA REPAIR 3. LIVER WEDGE BIOPSY 4.  INTRAOPERATIVE ENDOSCOPY performed by Christiano Haynes MD at THE [facility] MAIN OR        Current Outpatient Medications   Medication Sig Dispense Refill    Pediatric Multivitamins-Iron (FLINTSTONES COMPLETE PO) Take by mouth      Bacillus Coagulans-Inulin (PROBIOTIC) 1-250 BILLION-MG CAPS Take by mouth

## 2023-10-11 ENCOUNTER — HOSPITAL ENCOUNTER (OUTPATIENT)
Facility: HOSPITAL | Age: 38
Discharge: HOME | End: 2023-10-14

## 2023-10-11 ENCOUNTER — OFFICE VISIT (OUTPATIENT)
Age: 38
End: 2023-10-11
Payer: COMMERCIAL

## 2023-10-11 VITALS
DIASTOLIC BLOOD PRESSURE: 67 MMHG | HEART RATE: 74 BPM | SYSTOLIC BLOOD PRESSURE: 118 MMHG | OXYGEN SATURATION: 100 % | TEMPERATURE: 97.1 F | BODY MASS INDEX: 35.94 KG/M2 | WEIGHT: 215.7 LBS | HEIGHT: 65 IN

## 2023-10-11 DIAGNOSIS — Z98.84 S/P GASTRIC BYPASS: ICD-10-CM

## 2023-10-11 DIAGNOSIS — K90.9 INTESTINAL MALABSORPTION, UNSPECIFIED TYPE: ICD-10-CM

## 2023-10-11 DIAGNOSIS — K90.9 INTESTINAL MALABSORPTION, UNSPECIFIED TYPE: Primary | ICD-10-CM

## 2023-10-11 PROBLEM — G89.18 POST-OP PAIN: Status: RESOLVED | Noted: 2023-05-18 | Resolved: 2023-10-11

## 2023-10-11 PROBLEM — E66.01 MORBID OBESITY WITH BODY MASS INDEX (BMI) OF 40.0 OR HIGHER (HCC): Status: RESOLVED | Noted: 2023-07-06 | Resolved: 2023-10-11

## 2023-10-11 PROCEDURE — 99214 OFFICE O/P EST MOD 30 MIN: CPT | Performed by: NURSE PRACTITIONER

## 2023-10-11 PROCEDURE — 3074F SYST BP LT 130 MM HG: CPT | Performed by: NURSE PRACTITIONER

## 2023-10-11 PROCEDURE — 3078F DIAST BP <80 MM HG: CPT | Performed by: NURSE PRACTITIONER

## 2023-10-11 RX ORDER — NORETHINDRONE 0.35 MG/1
TABLET ORAL
COMMUNITY
Start: 2023-09-14

## 2023-10-11 RX ORDER — VITAMIN B COMPLEX
1 CAPSULE ORAL DAILY
COMMUNITY

## 2023-10-11 RX ORDER — MAGNESIUM 200 MG
TABLET ORAL
COMMUNITY

## 2023-10-11 RX ORDER — CALCIUM CARBONATE 500(1250)
500 TABLET ORAL DAILY
COMMUNITY

## 2023-10-11 RX ORDER — SUCRALFATE 1 G/1
1 TABLET ORAL 3 TIMES DAILY
Qty: 42 TABLET | Refills: 0 | Status: SHIPPED | OUTPATIENT
Start: 2023-10-11 | End: 2023-10-25

## 2023-10-11 RX ORDER — MULTIVIT-MIN/IRON/FOLIC ACID/K 18-600-40
CAPSULE ORAL
COMMUNITY

## 2023-10-12 ENCOUNTER — HOSPITAL ENCOUNTER (OUTPATIENT)
Facility: HOSPITAL | Age: 38
Discharge: HOME | End: 2023-10-12
Payer: COMMERCIAL

## 2023-10-12 DIAGNOSIS — E53.9 VITAMIN B DEFICIENCY: ICD-10-CM

## 2023-10-12 DIAGNOSIS — Z98.84 S/P GASTRIC BYPASS: ICD-10-CM

## 2023-10-12 DIAGNOSIS — I10 PRIMARY HYPERTENSION: ICD-10-CM

## 2023-10-12 DIAGNOSIS — K90.9 INTESTINAL MALABSORPTION, UNSPECIFIED TYPE: ICD-10-CM

## 2023-10-12 DIAGNOSIS — K90.9 INTESTINAL MALABSORPTION, UNSPECIFIED TYPE: Primary | ICD-10-CM

## 2023-10-12 DIAGNOSIS — E55.9 VITAMIN D DEFICIENCY: ICD-10-CM

## 2023-10-12 LAB
25(OH)D3 SERPL-MCNC: 66.9 NG/ML (ref 30–100)
ALBUMIN SERPL-MCNC: 3.4 G/DL (ref 3.4–5)
ALBUMIN/GLOB SERPL: 0.9 (ref 0.8–1.7)
ALP SERPL-CCNC: 81 U/L (ref 45–117)
ALT SERPL-CCNC: 25 U/L (ref 13–56)
ANION GAP SERPL CALC-SCNC: 3 MMOL/L (ref 3–18)
AST SERPL-CCNC: 16 U/L (ref 10–38)
BASOPHILS # BLD: 0 K/UL (ref 0–0.1)
BASOPHILS NFR BLD: 0 % (ref 0–2)
BILIRUB SERPL-MCNC: 0.4 MG/DL (ref 0.2–1)
BUN SERPL-MCNC: 13 MG/DL (ref 7–18)
BUN/CREAT SERPL: 19 (ref 12–20)
CALCIUM SERPL-MCNC: 9.4 MG/DL (ref 8.5–10.1)
CHLORIDE SERPL-SCNC: 105 MMOL/L (ref 100–111)
CO2 SERPL-SCNC: 28 MMOL/L (ref 21–32)
CREAT SERPL-MCNC: 0.7 MG/DL (ref 0.6–1.3)
DIFFERENTIAL METHOD BLD: NORMAL
EOSINOPHIL # BLD: 0 K/UL (ref 0–0.4)
EOSINOPHIL NFR BLD: 0 % (ref 0–5)
ERYTHROCYTE [DISTWIDTH] IN BLOOD BY AUTOMATED COUNT: 13.2 % (ref 11.6–14.5)
FERRITIN SERPL-MCNC: 46 NG/ML (ref 8–388)
FOLATE SERPL-MCNC: >20 NG/ML (ref 3.1–17.5)
GLOBULIN SER CALC-MCNC: 3.8 G/DL (ref 2–4)
GLUCOSE SERPL-MCNC: 85 MG/DL (ref 74–99)
HCT VFR BLD AUTO: 39.8 % (ref 35–45)
HGB BLD-MCNC: 12.9 G/DL (ref 12–16)
IMM GRANULOCYTES # BLD AUTO: 0 K/UL (ref 0–0.04)
IMM GRANULOCYTES NFR BLD AUTO: 0 % (ref 0–0.5)
IRON SERPL-MCNC: 53 UG/DL (ref 50–175)
LYMPHOCYTES # BLD: 1.6 K/UL (ref 0.9–3.6)
LYMPHOCYTES NFR BLD: 34 % (ref 21–52)
MCH RBC QN AUTO: 28.7 PG (ref 24–34)
MCHC RBC AUTO-ENTMCNC: 32.4 G/DL (ref 31–37)
MCV RBC AUTO: 88.6 FL (ref 78–100)
MONOCYTES # BLD: 0.3 K/UL (ref 0.05–1.2)
MONOCYTES NFR BLD: 7 % (ref 3–10)
NEUTS SEG # BLD: 2.8 K/UL (ref 1.8–8)
NEUTS SEG NFR BLD: 58 % (ref 40–73)
NRBC # BLD: 0 K/UL (ref 0–0.01)
NRBC BLD-RTO: 0 PER 100 WBC
PLATELET # BLD AUTO: 300 K/UL (ref 135–420)
PMV BLD AUTO: 10.7 FL (ref 9.2–11.8)
POTASSIUM SERPL-SCNC: 4.4 MMOL/L (ref 3.5–5.5)
PROT SERPL-MCNC: 7.2 G/DL (ref 6.4–8.2)
RBC # BLD AUTO: 4.49 M/UL (ref 4.2–5.3)
SODIUM SERPL-SCNC: 136 MMOL/L (ref 136–145)
VIT B12 SERPL-MCNC: >2000 PG/ML (ref 211–911)
WBC # BLD AUTO: 4.8 K/UL (ref 4.6–13.2)

## 2023-10-12 PROCEDURE — 36415 COLL VENOUS BLD VENIPUNCTURE: CPT

## 2023-10-12 PROCEDURE — 82746 ASSAY OF FOLIC ACID SERUM: CPT

## 2023-10-12 PROCEDURE — 83540 ASSAY OF IRON: CPT

## 2023-10-12 PROCEDURE — 82306 VITAMIN D 25 HYDROXY: CPT

## 2023-10-12 PROCEDURE — 80053 COMPREHEN METABOLIC PANEL: CPT

## 2023-10-12 PROCEDURE — 85025 COMPLETE CBC W/AUTO DIFF WBC: CPT

## 2023-10-12 PROCEDURE — 84425 ASSAY OF VITAMIN B-1: CPT

## 2023-10-12 PROCEDURE — 82728 ASSAY OF FERRITIN: CPT

## 2023-10-12 PROCEDURE — 82607 VITAMIN B-12: CPT

## 2023-10-15 LAB — VIT B1 BLD-SCNC: 88.2 NMOL/L (ref 66.5–200)

## 2023-10-24 ENCOUNTER — APPOINTMENT (OUTPATIENT)
Facility: HOSPITAL | Age: 38
End: 2023-10-24
Payer: COMMERCIAL

## 2023-10-24 ENCOUNTER — HOSPITAL ENCOUNTER (EMERGENCY)
Facility: HOSPITAL | Age: 38
Discharge: HOME | End: 2023-10-24
Attending: EMERGENCY MEDICINE
Payer: COMMERCIAL

## 2023-10-24 VITALS
SYSTOLIC BLOOD PRESSURE: 126 MMHG | DIASTOLIC BLOOD PRESSURE: 63 MMHG | TEMPERATURE: 98.1 F | HEART RATE: 72 BPM | BODY MASS INDEX: 35.49 KG/M2 | RESPIRATION RATE: 20 BRPM | WEIGHT: 213 LBS | HEIGHT: 65 IN | OXYGEN SATURATION: 100 %

## 2023-10-24 DIAGNOSIS — M25.522 ELBOW PAIN, LEFT: ICD-10-CM

## 2023-10-24 DIAGNOSIS — M79.645 FINGER PAIN, LEFT: Primary | ICD-10-CM

## 2023-10-24 PROCEDURE — 99283 EMERGENCY DEPT VISIT LOW MDM: CPT

## 2023-10-24 PROCEDURE — 73080 X-RAY EXAM OF ELBOW: CPT

## 2023-10-24 PROCEDURE — 73130 X-RAY EXAM OF HAND: CPT

## 2023-10-24 ASSESSMENT — PAIN - FUNCTIONAL ASSESSMENT: PAIN_FUNCTIONAL_ASSESSMENT: 0-10

## 2023-10-24 ASSESSMENT — ENCOUNTER SYMPTOMS
BACK PAIN: 0
CHEST TIGHTNESS: 0
SHORTNESS OF BREATH: 0

## 2023-10-24 ASSESSMENT — PAIN SCALES - GENERAL: PAINLEVEL_OUTOF10: 5

## 2023-10-24 NOTE — ED TRIAGE NOTES
Pt states she woke up yesterday and her left elbow was sore.  Also c/o left 4th finger being tender to bend after breakig her nail 2 months ago

## 2023-10-24 NOTE — DISCHARGE INSTRUCTIONS
Return to emergency department if any loss of sensation, motor control, cool feeling or pale appearance of the hand that may indicate damage to the nerves or blood vessels. Return to emergency department if pain is not tolerable. Use Tylenol and Motrin as well as ice packs, Ace wrap for compression to treat pain and swelling.

## 2023-10-24 NOTE — ED NOTES
Discharge instructions reviewed with patient. Patient verbalized understanding. Patient advised to follow up as directed on discharge instructions. Patient denies questions, needs or concerns at this time. Patient verbalized understanding. No s/sx of distress noted.        Lucinda Mason RN  10/24/23 1932

## 2023-10-24 NOTE — ED PROVIDER NOTES
EMERGENCY DEPARTMENT HISTORY AND PHYSICAL EXAM        Date: 10/24/2023  Patient Name: Starla Wood    History of Presenting Illness     Chief Complaint   Patient presents with    Elbow Pain    Finger Pain       History Provided By: History obtained from patient    HPI: Starla Wood, 41 y.o. female presents to the ED with cc of left elbow pain intermittent x3 months worsening last few days, fourth finger left hand pain x 2 months    Patient reports breaking an artificial nail on her fourth finger left hand 2 months ago that she had evaluated outpatient by a hand surgeon. There is no damage to the nailbed and she was released without restrictions. She reports persistent pain since then, describes it as being sore. No discoloration bleeding or deformity. No nausea, vomiting, diarrhea, fever, chills, chest pain, shortness of breath, leg swelling     There are no other complaints, changes, or physical findings at this time. Records Reviewed: none    PCP: Lorin Caldera DNP    No current facility-administered medications on file prior to encounter.      Current Outpatient Medications on File Prior to Encounter   Medication Sig Dispense Refill    Prenatal Vit-Fe Fumarate-FA (PRENATAL 1+1 PO) Take by mouth (Patient not taking: Reported on 10/24/2023)      PANTERA 0.35 MG tablet       calcium carbonate (OSCAL) 500 MG TABS tablet Take 1 tablet by mouth daily      Cholecalciferol (VITAMIN D) 50 MCG (2000 UT) CAPS capsule Take by mouth      b complex vitamins capsule Take 1 capsule by mouth daily      Cyanocobalamin (B-12) 1000 MCG SUBL Place under the tongue      sucralfate (CARAFATE) 1 GM tablet Take 1 tablet by mouth in the morning, at noon, and at bedtime for 14 days Dissolve in 1 Tbsp water 42 tablet 0           Past History     Past Medical History:  Past Medical History:   Diagnosis Date    Anal fissure     Chronic pain     Dyslipidemia 04/25/2022    Exercise tolerance finding 05/01/2023

## 2023-10-26 ENCOUNTER — TELEMEDICINE (OUTPATIENT)
Age: 38
End: 2023-10-26
Payer: COMMERCIAL

## 2023-10-26 VITALS — HEIGHT: 65 IN | WEIGHT: 213 LBS | BODY MASS INDEX: 35.49 KG/M2

## 2023-10-26 DIAGNOSIS — K90.9 INTESTINAL MALABSORPTION, UNSPECIFIED TYPE: Primary | ICD-10-CM

## 2023-10-26 DIAGNOSIS — Z98.84 S/P GASTRIC BYPASS: ICD-10-CM

## 2023-10-26 PROCEDURE — 99213 OFFICE O/P EST LOW 20 MIN: CPT | Performed by: NURSE PRACTITIONER

## 2023-10-26 RX ORDER — OMEPRAZOLE 20 MG/1
20 CAPSULE, DELAYED RELEASE ORAL DAILY
COMMUNITY

## 2023-10-26 NOTE — PROGRESS NOTES
Abdominal Pain Follow-up    History of Present Illness:    Murray Pagan is a prior laparoscopic gastric bypass surgery   patient who underwent their weight loss surgical procedure procedure approximately 3.75 months ago. She is following up in sudden epigastric pain 2 weeks ago after having shrimp get stuck. Was treated with carafate slurry TID and PPI. Reports feeling 100% improving. No dysphagia, abdominal pain, nausea or reflux. Past Medical History:   Diagnosis Date    Anal fissure     Chronic pain     Dyslipidemia 04/25/2022    Exercise tolerance finding 05/01/2023    Probably not able to go up and down two flights of stairs without SOB but no chest pain. Able to walk a mile without SOB or chest pain on level ground    GERD (gastroesophageal reflux disease)     avoids food triggers, ginger ale TUMS    Morbid (severe) obesity due to excess calories (HCC)     Morbid obesity with body mass index (BMI) of 40.0 to 49.9 ([address] )     Primary hypertension 09/29/2022    Refraction disorder     Supposed to wear glasses but does not. Vitamin D deficiency 04/25/2022     Past Surgical History:   Procedure Laterality Date    CHOLECYSTECTOMY, LAPAROSCOPIC N/A 5/18/2023    LAPAROSCOPIC CHOLECYSTECTOMY INTEPERATIVE CHOLANGIOGRAM WITH C-ARM performed by Derrick Romero MD at [facility] N/A 3/12/2021    SIGMOIDOSCOPY FLEXIBLE with bxs performed by Annette Ghohs MD at [address] [place] [address] GASTRIC BYPASS N/A 7/6/2023    1. LAPAROSCOPIC GASTRIC BYPASS 2. HIATAL HERNIA REPAIR 3. LIVER WEDGE BIOPSY 4.  INTRAOPERATIVE ENDOSCOPY performed by Derrick Romero MD at THE [facility] MAIN OR      Family History   Problem Relation Age of Onset    Diabetes Brother     Cancer Neg Hx     Heart Disease Neg Hx     Stroke Neg Hx     Hypertension Mother      Social History     Socioeconomic History    Marital status: Single   Tobacco Use    Smoking status: Never     Passive

## 2023-11-27 ENCOUNTER — HOSPITAL ENCOUNTER (EMERGENCY)
Facility: HOSPITAL | Age: 38
Discharge: HOME | End: 2023-11-27
Attending: EMERGENCY MEDICINE
Payer: COMMERCIAL

## 2023-11-27 VITALS
SYSTOLIC BLOOD PRESSURE: 118 MMHG | DIASTOLIC BLOOD PRESSURE: 86 MMHG | HEART RATE: 78 BPM | WEIGHT: 204 LBS | RESPIRATION RATE: 18 BRPM | TEMPERATURE: 98.5 F | BODY MASS INDEX: 33.99 KG/M2 | HEIGHT: 65 IN | OXYGEN SATURATION: 100 %

## 2023-11-27 DIAGNOSIS — J06.9 ACUTE UPPER RESPIRATORY INFECTION: Primary | ICD-10-CM

## 2023-11-27 PROCEDURE — 87635 SARS-COV-2 COVID-19 AMP PRB: CPT

## 2023-11-27 PROCEDURE — 87804 INFLUENZA ASSAY W/OPTIC: CPT

## 2023-11-27 PROCEDURE — 99283 EMERGENCY DEPT VISIT LOW MDM: CPT

## 2023-11-27 PROCEDURE — 6370000000 HC RX 637 (ALT 250 FOR IP): Performed by: EMERGENCY MEDICINE

## 2023-11-27 RX ORDER — AMOXICILLIN 250 MG/1
500 CAPSULE ORAL
Status: COMPLETED | OUTPATIENT
Start: 2023-11-27 | End: 2023-11-27

## 2023-11-27 RX ORDER — AMOXICILLIN 500 MG/1
500 CAPSULE ORAL 3 TIMES DAILY
Qty: 30 CAPSULE | Refills: 0 | Status: SHIPPED | OUTPATIENT
Start: 2023-11-27 | End: 2023-12-07

## 2023-11-27 RX ADMIN — AMOXICILLIN 500 MG: 250 CAPSULE ORAL at 01:22

## 2023-11-27 ASSESSMENT — LIFESTYLE VARIABLES
HOW OFTEN DO YOU HAVE A DRINK CONTAINING ALCOHOL: NEVER
HOW MANY STANDARD DRINKS CONTAINING ALCOHOL DO YOU HAVE ON A TYPICAL DAY: PATIENT DOES NOT DRINK

## 2023-11-27 ASSESSMENT — ENCOUNTER SYMPTOMS
SORE THROAT: 0
PHOTOPHOBIA: 0
COUGH: 0
EYE DISCHARGE: 0
TROUBLE SWALLOWING: 0
SHORTNESS OF BREATH: 0

## 2023-11-27 ASSESSMENT — PAIN DESCRIPTION - FREQUENCY: FREQUENCY: CONTINUOUS

## 2023-11-27 ASSESSMENT — PAIN DESCRIPTION - PAIN TYPE: TYPE: ACUTE PAIN

## 2023-11-27 ASSESSMENT — PAIN SCALES - GENERAL: PAINLEVEL_OUTOF10: 8

## 2023-11-27 ASSESSMENT — PAIN DESCRIPTION - DESCRIPTORS: DESCRIPTORS: ACHING

## 2023-11-27 ASSESSMENT — PAIN DESCRIPTION - LOCATION: LOCATION: GENERALIZED

## 2023-11-27 ASSESSMENT — PAIN - FUNCTIONAL ASSESSMENT: PAIN_FUNCTIONAL_ASSESSMENT: 0-10

## 2023-11-27 NOTE — ED NOTES
Administered medication and educated patient on side effects. Patient allergies verified. All questions and concerns answered. Pt instructed to use call bell at the bedside if needed. This nurse will continues to monitor pt at this time. Side rails up. Patient discharged at this time. This RN reviewed discharge instructions at this time with the patient. patient verbalized understanding and does not have any questions. Pt ambulatory upon discharge, & in stable condition. Pt armband removed & shredded.         Santy Morrow RN  11/27/23 4503

## 2023-11-27 NOTE — ED TRIAGE NOTES
Pt c/o nasal congestion, generalized body aches, headache and cough since tonight. Pt stated \"that the chest congestion started Saturday and has gotten worse when she cough\". Past Medical History:   Diagnosis Date    Anal fissure     Chronic pain     Dyslipidemia 04/25/2022    Exercise tolerance finding 05/01/2023    Probably not able to go up and down two flights of stairs without SOB but no chest pain. Able to walk a mile without SOB or chest pain on level ground    GERD (gastroesophageal reflux disease)     avoids food triggers, ginger ale TUMS    Morbid (severe) obesity due to excess calories (HCC)     Morbid obesity with body mass index (BMI) of 40.0 to 49.9 ([address])     Primary hypertension 09/29/2022    Refraction disorder     Supposed to wear glasses but does not.      Vitamin D deficiency 04/25/2022

## 2023-11-27 NOTE — ED PROVIDER NOTES
`[facility] EMERGENCY DEPT  eMERGENCY dEPARTMENT eNCOUnter      Pt Name: Rick Suarez  MRN: [id number]  [address] [facility] 1985 of evaluation: 11/27/2023  Provider:Zack Weathers MD    CHIEF COMPLAINT         HPI    Rick Suarez is a 41 y.o. female  c/o having nasal congestion, generalized body aches, headache and cough that started tonight. Pt stated \"that the chest congestion started Saturday and has gotten worse when she cough\". ROS    Review of Systems   Constitutional:  Positive for activity change. HENT:  Positive for congestion. Negative for ear pain, postnasal drip, sore throat and trouble swallowing. Eyes:  Negative for photophobia and discharge. Respiratory:  Negative for cough and shortness of breath. Cardiovascular:  Negative for chest pain. Musculoskeletal:  Positive for arthralgias. Except as noted above the remainder of the review of systems was reviewed and negative. PAST MEDICAL HISTORY     Past Medical History:   Diagnosis Date    Anal fissure     Chronic pain     Dyslipidemia 04/25/2022    Exercise tolerance finding 05/01/2023    Probably not able to go up and down two flights of stairs without SOB but no chest pain. Able to walk a mile without SOB or chest pain on level ground    GERD (gastroesophageal reflux disease)     avoids food triggers, ginger ale TUMS    Morbid (severe) obesity due to excess calories (HCC)     Morbid obesity with body mass index (BMI) of 40.0 to 49.9 ([address] [facility])     Primary hypertension 09/29/2022    Refraction disorder     Supposed to wear glasses but does not.      Vitamin D deficiency 04/25/2022         SURGICAL HISTORY       Past Surgical History:   Procedure Laterality Date    CHOLECYSTECTOMY, LAPAROSCOPIC N/A 5/18/2023    LAPAROSCOPIC CHOLECYSTECTOMY INTEPERATIVE CHOLANGIOGRAM WITH C-ARM performed by Jeramie Murphy MD at [address] [facility] N/A 3/12/2021    SIGMOIDOSCOPY FLEXIBLE with bxs performed by Reina Adame

## 2024-01-10 ENCOUNTER — HOSPITAL ENCOUNTER (OUTPATIENT)
Facility: HOSPITAL | Age: 39
Discharge: HOME | End: 2024-01-13
Payer: COMMERCIAL

## 2024-01-10 LAB
ANION GAP SERPL CALC-SCNC: 4 MMOL/L (ref 3–18)
BUN SERPL-MCNC: 9 MG/DL (ref 7–18)
BUN/CREAT SERPL: 12 (ref 12–20)
CALCIUM SERPL-MCNC: 9.3 MG/DL (ref 8.5–10.1)
CHLORIDE SERPL-SCNC: 109 MMOL/L (ref 100–111)
CO2 SERPL-SCNC: 28 MMOL/L (ref 21–32)
CREAT SERPL-MCNC: 0.75 MG/DL (ref 0.6–1.3)
GLUCOSE SERPL-MCNC: 87 MG/DL (ref 74–99)
POTASSIUM SERPL-SCNC: 4.1 MMOL/L (ref 3.5–5.5)
SODIUM SERPL-SCNC: 141 MMOL/L (ref 136–145)

## 2024-01-10 PROCEDURE — 80048 BASIC METABOLIC PNL TOTAL CA: CPT

## 2024-01-10 PROCEDURE — 36415 COLL VENOUS BLD VENIPUNCTURE: CPT

## 2024-01-11 ENCOUNTER — OFFICE VISIT (OUTPATIENT)
Age: 39
End: 2024-01-11
Payer: COMMERCIAL

## 2024-01-11 VITALS
RESPIRATION RATE: 16 BRPM | TEMPERATURE: 97.7 F | HEART RATE: 78 BPM | OXYGEN SATURATION: 100 % | BODY MASS INDEX: 34.22 KG/M2 | HEIGHT: 65 IN | WEIGHT: 205.4 LBS | DIASTOLIC BLOOD PRESSURE: 82 MMHG | SYSTOLIC BLOOD PRESSURE: 120 MMHG

## 2024-01-11 DIAGNOSIS — Z98.84 S/P GASTRIC BYPASS: ICD-10-CM

## 2024-01-11 DIAGNOSIS — E55.9 VITAMIN D DEFICIENCY: ICD-10-CM

## 2024-01-11 DIAGNOSIS — I10 PRIMARY HYPERTENSION: Primary | ICD-10-CM

## 2024-01-11 DIAGNOSIS — K21.9 GASTROESOPHAGEAL REFLUX DISEASE WITHOUT ESOPHAGITIS: ICD-10-CM

## 2024-01-11 DIAGNOSIS — E78.5 DYSLIPIDEMIA: ICD-10-CM

## 2024-01-11 DIAGNOSIS — Z23 NEED FOR PROPHYLACTIC VACCINATION AGAINST DIPHTHERIA-TETANUS-PERTUSSIS (DTP): ICD-10-CM

## 2024-01-11 PROBLEM — E66.1 CLASS 3 DRUG-INDUCED OBESITY WITHOUT SERIOUS COMORBIDITY WITH BODY MASS INDEX (BMI) OF 40.0 TO 44.9 IN ADULT (HCC): Status: RESOLVED | Noted: 2023-06-30 | Resolved: 2024-01-11

## 2024-01-11 PROBLEM — E66.813 CLASS 3 DRUG-INDUCED OBESITY WITHOUT SERIOUS COMORBIDITY WITH BODY MASS INDEX (BMI) OF 40.0 TO 44.9 IN ADULT: Status: RESOLVED | Noted: 2023-06-30 | Resolved: 2024-01-11

## 2024-01-11 PROCEDURE — 90471 IMMUNIZATION ADMIN: CPT | Performed by: NURSE PRACTITIONER

## 2024-01-11 PROCEDURE — 3079F DIAST BP 80-89 MM HG: CPT | Performed by: NURSE PRACTITIONER

## 2024-01-11 PROCEDURE — 3074F SYST BP LT 130 MM HG: CPT | Performed by: NURSE PRACTITIONER

## 2024-01-11 PROCEDURE — 99213 OFFICE O/P EST LOW 20 MIN: CPT | Performed by: NURSE PRACTITIONER

## 2024-01-11 PROCEDURE — 90715 TDAP VACCINE 7 YRS/> IM: CPT | Performed by: NURSE PRACTITIONER

## 2024-01-11 ASSESSMENT — PATIENT HEALTH QUESTIONNAIRE - PHQ9
1. LITTLE INTEREST OR PLEASURE IN DOING THINGS: 0
2. FEELING DOWN, DEPRESSED OR HOPELESS: 0
SUM OF ALL RESPONSES TO PHQ QUESTIONS 1-9: 0
SUM OF ALL RESPONSES TO PHQ9 QUESTIONS 1 & 2: 0

## 2024-01-11 NOTE — ASSESSMENT & PLAN NOTE
Resolved after losing weight  No longer requires amlodipine  Encouraged pt to monitor BP periodically and report if >140/90

## 2024-01-11 NOTE — ASSESSMENT & PLAN NOTE
Well-controlled, lifestyle modifications recommended  No longer requires PPI  Avoid gut irritants such as spicy foods  Avoid laying down for 30 to 45 minutes after eating  Avoid excessive alcohol consumption

## 2024-01-11 NOTE — PROGRESS NOTES
Sophy Ceballos presents today for   Chief Complaint   Patient presents with    Follow-up                 1. \"Have you been to the ER, urgent care clinic since your last visit?  Hospitalized since your last visit?\" no    2. \"Have you seen or consulted any other health care providers outside of the [facility] since your last visit?\" no     3. For patients aged 45-75: Has the patient had a colonoscopy / FIT/ Cologuard? NA - based on age      If the patient is female:    4. For patients aged 40-74: Has the patient had a mammogram within the past 2 years? No      5. For patients aged 21-65: Has the patient had a pap smear? Yes - no Care Gap present   
Future  2. Gastroesophageal reflux disease without esophagitis  Assessment & Plan:   Well-controlled, lifestyle modifications recommended  No longer requires PPI  Avoid gut irritants such as spicy foods  Avoid laying down for 30 to 45 minutes after eating  Avoid excessive alcohol consumption    3. S/P gastric bypass  Assessment & Plan:  Surgery 7/6/2023.   Dr Canales  Cont supplements as prescribed  June 023 wt 260  Today wt 205    Orders:  -     CBC with Auto Differential; Future  -     Comprehensive Metabolic Panel; Future  4. Vitamin D deficiency  Assessment & Plan:  Oct 2023 level 66.9  Yola level 1yr  Completed Vit D 63891  Cont OTC Vit D 2000 units qd  Orders:  -     Vitamin D 25 Hydroxy; Future  5. Dyslipidemia  -     Lipid Panel; Future  6. Need for prophylactic vaccination against diphtheria-tetanus-pertussis (DTP)  -     Tdap (age 10y and older) IM (Boostrix)      Reviewed medication and completed medication reconciliation with the patient. Reviewed side effects of medications with the patient. Questions were answered and patient verb understanding.    Past Medical History:   Diagnosis Date    Anal fissure     Chronic pain     Dyslipidemia 04/25/2022    Exercise tolerance finding 05/01/2023    Probably not able to go up and down two flights of stairs without SOB but no chest pain. Able to walk a mile without SOB or chest pain on level ground    GERD (gastroesophageal reflux disease)     avoids food triggers, ginger ale TUMS    Morbid (severe) obesity due to excess calories (HCC)     Morbid obesity with body mass index (BMI) of 40.0 to 49.9 (HCC)     Primary hypertension 09/29/2022    Refraction disorder     Supposed to wear glasses but does not.     Vitamin D deficiency 04/25/2022     Current Outpatient Medications   Medication Sig    Prenatal Vit-Fe Fumarate-FA (PRENATAL 1+1 PO) Take by mouth    PANTERA 0.35 MG tablet     calcium carbonate (OSCAL) 500 MG TABS tablet Take 1 tablet by mouth daily

## 2024-02-18 ENCOUNTER — HOSPITAL ENCOUNTER (EMERGENCY)
Facility: HOSPITAL | Age: 39
Discharge: HOME | End: 2024-02-18
Attending: EMERGENCY MEDICINE
Payer: COMMERCIAL

## 2024-02-18 ENCOUNTER — APPOINTMENT (OUTPATIENT)
Facility: HOSPITAL | Age: 39
End: 2024-02-18
Payer: COMMERCIAL

## 2024-02-18 VITALS
SYSTOLIC BLOOD PRESSURE: 121 MMHG | RESPIRATION RATE: 24 BRPM | DIASTOLIC BLOOD PRESSURE: 71 MMHG | BODY MASS INDEX: 32.15 KG/M2 | OXYGEN SATURATION: 100 % | HEIGHT: 65 IN | TEMPERATURE: 97.2 F | HEART RATE: 84 BPM | WEIGHT: 193 LBS

## 2024-02-18 DIAGNOSIS — R10.9 FLANK PAIN: ICD-10-CM

## 2024-02-18 DIAGNOSIS — N10 ACUTE PYELONEPHRITIS: ICD-10-CM

## 2024-02-18 DIAGNOSIS — N39.0 URINARY TRACT INFECTION WITHOUT HEMATURIA, SITE UNSPECIFIED: Primary | ICD-10-CM

## 2024-02-18 LAB
ALBUMIN SERPL-MCNC: 3.4 G/DL (ref 3.4–5)
ALBUMIN/GLOB SERPL: 0.8 (ref 0.8–1.7)
ALP SERPL-CCNC: 81 U/L (ref 45–117)
ALT SERPL-CCNC: 23 U/L (ref 13–56)
ANION GAP SERPL CALC-SCNC: 0 MMOL/L (ref 3–18)
APPEARANCE UR: ABNORMAL
AST SERPL-CCNC: 12 U/L (ref 10–38)
BACTERIA URNS QL MICRO: ABNORMAL /HPF
BASOPHILS # BLD: 0 K/UL (ref 0–0.1)
BASOPHILS NFR BLD: 1 % (ref 0–2)
BILIRUB SERPL-MCNC: 0.3 MG/DL (ref 0.2–1)
BILIRUB UR QL: NEGATIVE
BUN SERPL-MCNC: 8 MG/DL (ref 7–18)
BUN/CREAT SERPL: 12 (ref 12–20)
CALCIUM SERPL-MCNC: 8.9 MG/DL (ref 8.5–10.1)
CHLORIDE SERPL-SCNC: 108 MMOL/L (ref 100–111)
CO2 SERPL-SCNC: 31 MMOL/L (ref 21–32)
COLOR UR: YELLOW
CREAT SERPL-MCNC: 0.69 MG/DL (ref 0.6–1.3)
DIFFERENTIAL METHOD BLD: ABNORMAL
EOSINOPHIL # BLD: 0 K/UL (ref 0–0.4)
EOSINOPHIL NFR BLD: 1 % (ref 0–5)
EPITH CASTS URNS QL MICRO: ABNORMAL /LPF (ref 0–5)
ERYTHROCYTE [DISTWIDTH] IN BLOOD BY AUTOMATED COUNT: 11.8 % (ref 11.6–14.5)
GLOBULIN SER CALC-MCNC: 4.2 G/DL (ref 2–4)
GLUCOSE SERPL-MCNC: 101 MG/DL (ref 74–99)
GLUCOSE UR STRIP.AUTO-MCNC: NEGATIVE MG/DL
HCG UR QL: NEGATIVE
HCT VFR BLD AUTO: 37.5 % (ref 35–45)
HGB BLD-MCNC: 12.9 G/DL (ref 12–16)
HGB UR QL STRIP: NEGATIVE
IMM GRANULOCYTES # BLD AUTO: 0 K/UL (ref 0–0.04)
IMM GRANULOCYTES NFR BLD AUTO: 0 % (ref 0–0.5)
KETONES UR QL STRIP.AUTO: ABNORMAL MG/DL
LEUKOCYTE ESTERASE UR QL STRIP.AUTO: ABNORMAL
LIPASE SERPL-CCNC: 46 U/L (ref 13–75)
LYMPHOCYTES # BLD: 2.9 K/UL (ref 0.9–3.6)
LYMPHOCYTES NFR BLD: 55 % (ref 21–52)
MAGNESIUM SERPL-MCNC: 2 MG/DL (ref 1.6–2.6)
MCH RBC QN AUTO: 29.8 PG (ref 24–34)
MCHC RBC AUTO-ENTMCNC: 34.4 G/DL (ref 31–37)
MCV RBC AUTO: 86.6 FL (ref 78–100)
MONOCYTES # BLD: 0.4 K/UL (ref 0.05–1.2)
MONOCYTES NFR BLD: 7 % (ref 3–10)
NEUTS SEG # BLD: 1.9 K/UL (ref 1.8–8)
NEUTS SEG NFR BLD: 37 % (ref 40–73)
NITRITE UR QL STRIP.AUTO: NEGATIVE
NRBC # BLD: 0 K/UL (ref 0–0.01)
NRBC BLD-RTO: 0 PER 100 WBC
PH UR STRIP: 6.5 (ref 5–8)
PLATELET # BLD AUTO: 329 K/UL (ref 135–420)
PMV BLD AUTO: 9.4 FL (ref 9.2–11.8)
POTASSIUM SERPL-SCNC: 3.7 MMOL/L (ref 3.5–5.5)
PROT SERPL-MCNC: 7.6 G/DL (ref 6.4–8.2)
PROT UR STRIP-MCNC: ABNORMAL MG/DL
RBC # BLD AUTO: 4.33 M/UL (ref 4.2–5.3)
RBC #/AREA URNS HPF: NEGATIVE /HPF (ref 0–5)
SODIUM SERPL-SCNC: 139 MMOL/L (ref 136–145)
SP GR UR REFRACTOMETRY: 1.02 (ref 1–1.03)
UROBILINOGEN UR QL STRIP.AUTO: 1 EU/DL (ref 0.2–1)
WBC # BLD AUTO: 5.2 K/UL (ref 4.6–13.2)
WBC URNS QL MICRO: ABNORMAL /HPF (ref 0–4)

## 2024-02-18 PROCEDURE — C9113 INJ PANTOPRAZOLE SODIUM, VIA: HCPCS | Performed by: EMERGENCY MEDICINE

## 2024-02-18 PROCEDURE — 2580000003 HC RX 258: Performed by: EMERGENCY MEDICINE

## 2024-02-18 PROCEDURE — 85025 COMPLETE CBC W/AUTO DIFF WBC: CPT

## 2024-02-18 PROCEDURE — 83735 ASSAY OF MAGNESIUM: CPT

## 2024-02-18 PROCEDURE — 6360000004 HC RX CONTRAST MEDICATION: Performed by: EMERGENCY MEDICINE

## 2024-02-18 PROCEDURE — 99285 EMERGENCY DEPT VISIT HI MDM: CPT

## 2024-02-18 PROCEDURE — 80053 COMPREHEN METABOLIC PANEL: CPT

## 2024-02-18 PROCEDURE — 96375 TX/PRO/DX INJ NEW DRUG ADDON: CPT

## 2024-02-18 PROCEDURE — 96374 THER/PROPH/DIAG INJ IV PUSH: CPT

## 2024-02-18 PROCEDURE — A4216 STERILE WATER/SALINE, 10 ML: HCPCS | Performed by: EMERGENCY MEDICINE

## 2024-02-18 PROCEDURE — 81001 URINALYSIS AUTO W/SCOPE: CPT

## 2024-02-18 PROCEDURE — 83690 ASSAY OF LIPASE: CPT

## 2024-02-18 PROCEDURE — 6360000002 HC RX W HCPCS: Performed by: EMERGENCY MEDICINE

## 2024-02-18 PROCEDURE — 74177 CT ABD & PELVIS W/CONTRAST: CPT

## 2024-02-18 PROCEDURE — 81025 URINE PREGNANCY TEST: CPT

## 2024-02-18 RX ORDER — 0.9 % SODIUM CHLORIDE 0.9 %
1000 INTRAVENOUS SOLUTION INTRAVENOUS ONCE
Status: COMPLETED | OUTPATIENT
Start: 2024-02-18 | End: 2024-02-18

## 2024-02-18 RX ORDER — CIPROFLOXACIN 500 MG/1
500 TABLET, FILM COATED ORAL 2 TIMES DAILY
Qty: 14 TABLET | Refills: 0 | Status: SHIPPED | OUTPATIENT
Start: 2024-02-18 | End: 2024-02-25

## 2024-02-18 RX ORDER — ONDANSETRON 2 MG/ML
4 INJECTION INTRAMUSCULAR; INTRAVENOUS ONCE
Status: DISCONTINUED | OUTPATIENT
Start: 2024-02-18 | End: 2024-02-18 | Stop reason: HOSPADM

## 2024-02-18 RX ADMIN — PANTOPRAZOLE SODIUM 40 MG: 40 INJECTION, POWDER, FOR SOLUTION INTRAVENOUS at 02:25

## 2024-02-18 RX ADMIN — IOPAMIDOL 100 ML: 612 INJECTION, SOLUTION INTRAVENOUS at 03:13

## 2024-02-18 RX ADMIN — WATER 1000 MG: 1 INJECTION INTRAMUSCULAR; INTRAVENOUS; SUBCUTANEOUS at 03:14

## 2024-02-18 RX ADMIN — SODIUM CHLORIDE 1000 ML: 9 INJECTION, SOLUTION INTRAVENOUS at 02:37

## 2024-02-18 ASSESSMENT — PAIN DESCRIPTION - LOCATION: LOCATION: ABDOMEN;BACK

## 2024-02-18 ASSESSMENT — PAIN DESCRIPTION - DESCRIPTORS: DESCRIPTORS: CRAMPING

## 2024-02-18 ASSESSMENT — PAIN SCALES - GENERAL: PAINLEVEL_OUTOF10: 10

## 2024-02-18 ASSESSMENT — PAIN - FUNCTIONAL ASSESSMENT: PAIN_FUNCTIONAL_ASSESSMENT: 0-10

## 2024-02-18 NOTE — ED NOTES
Pt reports RUQ pain that radiates to right flank x 6 hours. Pt denies N/V/D. Pt states she had her gall bladder removed in June 2023 without complications. Pt also reports Gastric Bypass July of 2023. MD at bedside.

## 2024-03-08 ENCOUNTER — TELEPHONE (OUTPATIENT)
Age: 39
End: 2024-03-08

## 2024-03-08 NOTE — TELEPHONE ENCOUNTER
Patient called in stating that her obgyn told her to give her pcp and let them know that her back is in pain due to her breast.She is looming to have a breast reduction. Please advise on what she needs to do.

## 2024-03-08 NOTE — TELEPHONE ENCOUNTER
She needs to have a consult with plastic surgeon.  Have her call the office with the surgeons name, address, phone number so I can send in a referral.  Thank you

## 2024-04-04 ENCOUNTER — TELEPHONE (OUTPATIENT)
Age: 39
End: 2024-04-04

## 2024-04-04 NOTE — TELEPHONE ENCOUNTER
Pt called requesting a c/b pt stated that she think that she has anal fissure again b/c it hurts and she bleed when she uses the bathroom.      Please advise

## 2024-04-05 NOTE — TELEPHONE ENCOUNTER
Please instruct pt to stay well-hydrated with water and avoid becoming constipated as this may cause her fissure. She needs to increase her dietary fiber, she may take fiber capsules as well, start stool softeners such as Colace twice a day.  She may also try over-the-counter cream such as zinc oxide and 1% hydrocortisone.  Thank you

## 2024-04-05 NOTE — TELEPHONE ENCOUNTER
Pt notified via phone per provider. Pt verbalized understanding. No further action.      RONY Arzola LPN

## 2024-04-14 ENCOUNTER — HOSPITAL ENCOUNTER (EMERGENCY)
Facility: HOSPITAL | Age: 39
Discharge: HOME | End: 2024-04-14
Attending: STUDENT IN AN ORGANIZED HEALTH CARE EDUCATION/TRAINING PROGRAM
Payer: COMMERCIAL

## 2024-04-14 VITALS
HEART RATE: 62 BPM | HEIGHT: 65 IN | OXYGEN SATURATION: 100 % | WEIGHT: 165 LBS | BODY MASS INDEX: 27.49 KG/M2 | SYSTOLIC BLOOD PRESSURE: 122 MMHG | DIASTOLIC BLOOD PRESSURE: 86 MMHG | RESPIRATION RATE: 17 BRPM | TEMPERATURE: 98.6 F

## 2024-04-14 DIAGNOSIS — K62.5 RECTAL BLEEDING: Primary | ICD-10-CM

## 2024-04-14 LAB — HEMOCCULT STL QL: NEGATIVE

## 2024-04-14 PROCEDURE — 82270 OCCULT BLOOD FECES: CPT

## 2024-04-14 PROCEDURE — 99283 EMERGENCY DEPT VISIT LOW MDM: CPT

## 2024-04-14 RX ORDER — POLYETHYLENE GLYCOL 3350 17 G/17G
17 POWDER, FOR SOLUTION ORAL DAILY
Qty: 510 G | Refills: 0 | Status: SHIPPED | OUTPATIENT
Start: 2024-04-14 | End: 2024-04-17

## 2024-04-15 NOTE — ED PROVIDER NOTES
EMERGENCY DEPARTMENT HISTORY AND PHYSICAL EXAM      Date: 4/14/2024  Patient Name: Sophy Ceballos    History of Presenting Illness     Chief Complaint   Patient presents with    Rectal Bleeding       38-year-old female presenting for evaluation of rectal bleeding.  Patient states it has been ongoing intermittently for the last month.  States that when she wipes she notices bright red blood on the toilet paper.  Patient states that sometimes she does have dyschezia.  She states that she is currently on senna.  She has had an anal fissure repaired over 10 years ago but states the pain feels the same.          PCP: Kerry Jurado, DNP    No current facility-administered medications for this encounter.     Current Outpatient Medications   Medication Sig Dispense Refill    polyethylene glycol (GLYCOLAX) 17 GM/SCOOP powder Take 17 g by mouth daily 510 g 0    Prenatal Vit-Fe Fumarate-FA (PRENATAL 1+1 PO) Take by mouth      PANTERA 0.35 MG tablet       calcium carbonate (OSCAL) 500 MG TABS tablet Take 1 tablet by mouth daily      Cholecalciferol (VITAMIN D) 50 MCG (2000 UT) CAPS capsule Take by mouth      b complex vitamins capsule Take 1 capsule by mouth daily      Cyanocobalamin (B-12) 1000 MCG SUBL Place under the tongue         Past History     Past Medical History:  Past Medical History:   Diagnosis Date    Anal fissure     Chronic pain     Dyslipidemia 04/25/2022    Exercise tolerance finding 05/01/2023    Probably not able to go up and down two flights of stairs without SOB but no chest pain. Able to walk a mile without SOB or chest pain on level ground    GERD (gastroesophageal reflux disease)     avoids food triggers, ginger ale TUMS    Morbid (severe) obesity due to excess calories (HCC)     Morbid obesity with body mass index (BMI) of 40.0 to 49.9 (HCC)     Primary hypertension 09/29/2022    Refraction disorder     Supposed to wear glasses but does not.     Vitamin D deficiency 04/25/2022       Past

## 2024-04-15 NOTE — ED TRIAGE NOTES
Pt from home came to ED c/o rectal bleeding on and off for a month, patient noted some blood when she wiped tonight, history of Anal Fissure

## 2024-04-15 NOTE — DISCHARGE INSTRUCTIONS
Please call and make an appointment with the GI doctor and the colorectal surgeon as needed. Return for any new or worsening symptoms

## 2024-04-17 ENCOUNTER — OFFICE VISIT (OUTPATIENT)
Age: 39
End: 2024-04-17
Payer: COMMERCIAL

## 2024-04-17 VITALS
WEIGHT: 193 LBS | DIASTOLIC BLOOD PRESSURE: 78 MMHG | HEIGHT: 65 IN | OXYGEN SATURATION: 100 % | TEMPERATURE: 97.8 F | BODY MASS INDEX: 32.15 KG/M2 | HEART RATE: 74 BPM | SYSTOLIC BLOOD PRESSURE: 108 MMHG

## 2024-04-17 DIAGNOSIS — K59.00 CONSTIPATION, UNSPECIFIED CONSTIPATION TYPE: Primary | ICD-10-CM

## 2024-04-17 PROCEDURE — 3078F DIAST BP <80 MM HG: CPT | Performed by: NURSE PRACTITIONER

## 2024-04-17 PROCEDURE — 99213 OFFICE O/P EST LOW 20 MIN: CPT | Performed by: NURSE PRACTITIONER

## 2024-04-17 PROCEDURE — 3074F SYST BP LT 130 MM HG: CPT | Performed by: NURSE PRACTITIONER

## 2024-04-17 RX ORDER — CALCIUM POLYCARBOPHIL 625 MG
625 TABLET ORAL DAILY
Qty: 30 TABLET | Refills: 11 | COMMUNITY
Start: 2024-04-17

## 2024-04-17 RX ORDER — POLYETHYLENE GLYCOL 3350 17 G/17G
17 POWDER, FOR SOLUTION ORAL 2 TIMES DAILY
Qty: 1530 G | Refills: 0 | COMMUNITY
Start: 2024-04-17 | End: 2024-05-17

## 2024-04-17 NOTE — PROGRESS NOTES
Sophy Ceballos presents today for   Chief Complaint   Patient presents with    Follow-Up from Hospital                 1. \"Have you been to the ER, urgent care clinic since your last visit?  Hospitalized since your last visit?\" yes    2. \"Have you seen or consulted any other health care providers outside of the [facility] since your last visit?\" yes     3. For patients aged 45-75: Has the patient had a colonoscopy / FIT/ Cologuard? No      If the patient is female:    4. For patients aged 40-74: Has the patient had a mammogram within the past 2 years? No      5. For patients aged 21-65: Has the patient had a pap smear? Yes - no Care Gap present

## 2024-04-17 NOTE — ASSESSMENT & PLAN NOTE
Increase miralax to BID until she has a good BM then decrease to daily prn  Initiate fiber caps daily  Increase water intake  Stay active  F/u with GI

## 2024-04-17 NOTE — PROGRESS NOTES
[facility] [id number] [facility]  [address]  [place] [id number]  [phone] office/[phone] fax    4/17/2024    Sophy Ceballos 1985 is a pleasant Black /  female.     Todays concern/HPI:  Constipation. Started after bariatric surgery in July 2023. Taking miralax. LBM yesterday. Large size but hard. Dr Valles, GI. Drinks at least 40 oz per day. Taking prenatal.        Review of Systems - Negative except above    Physical:   Vitals:    04/17/24 0820   BP: 108/78   Site: Left Upper Arm   Position: Sitting   Cuff Size: Large Adult   Pulse: 74   Temp: 97.8 °F (36.6 °C)   TempSrc: Temporal   SpO2: 100%   Weight: 87.5 kg (193 lb)   Height: 1.651 m (5' 5\")       Exam:   Physical Exam  Constitutional:       Appearance: Normal appearance.   Eyes:      Extraocular Movements: Extraocular movements intact.      Conjunctiva/sclera: Conjunctivae normal.   Cardiovascular:      Rate and Rhythm: Normal rate and regular rhythm.      Heart sounds: Normal heart sounds.   Pulmonary:      Effort: Pulmonary effort is normal.      Breath sounds: Normal breath sounds.   Abdominal:      General: Abdomen is flat. Bowel sounds are normal. There is no distension.      Palpations: Abdomen is soft.      Tenderness: There is no abdominal tenderness.   Musculoskeletal:         General: Normal range of motion.   Neurological:      Mental Status: She is alert and oriented to person, place, and time.   Psychiatric:         Mood and Affect: Mood normal.         Body mass index is 32.12 kg/m².     Plan:    1. Constipation, unspecified constipation type  Assessment & Plan:  Increase miralax to BID until she has a good BM then decrease to daily prn  Initiate fiber caps daily  Increase water intake  Stay active  F/u with GI  Orders:  -     polyethylene glycol (GLYCOLAX) 17 GM/SCOOP powder; Take 17 g by mouth 2 times daily, Disp-1530 g, R-0OTC  -     Calcium Polycarbophil (FIBER) 625 MG TABS; Take 1

## 2024-05-04 PROCEDURE — 96374 THER/PROPH/DIAG INJ IV PUSH: CPT

## 2024-05-04 PROCEDURE — 96375 TX/PRO/DX INJ NEW DRUG ADDON: CPT

## 2024-05-04 PROCEDURE — 99285 EMERGENCY DEPT VISIT HI MDM: CPT

## 2024-05-05 ENCOUNTER — APPOINTMENT (OUTPATIENT)
Facility: HOSPITAL | Age: 39
End: 2024-05-05
Payer: COMMERCIAL

## 2024-05-05 ENCOUNTER — HOSPITAL ENCOUNTER (EMERGENCY)
Facility: HOSPITAL | Age: 39
Discharge: HOME | End: 2024-05-05
Attending: EMERGENCY MEDICINE
Payer: COMMERCIAL

## 2024-05-05 VITALS
RESPIRATION RATE: 18 BRPM | SYSTOLIC BLOOD PRESSURE: 113 MMHG | HEIGHT: 65 IN | WEIGHT: 188 LBS | OXYGEN SATURATION: 100 % | DIASTOLIC BLOOD PRESSURE: 72 MMHG | BODY MASS INDEX: 31.32 KG/M2 | HEART RATE: 86 BPM | TEMPERATURE: 98.7 F

## 2024-05-05 DIAGNOSIS — K59.00 CONSTIPATION, UNSPECIFIED CONSTIPATION TYPE: Primary | ICD-10-CM

## 2024-05-05 LAB
ALBUMIN SERPL-MCNC: 3.2 G/DL (ref 3.4–5)
ALBUMIN/GLOB SERPL: 0.8 (ref 0.8–1.7)
ALP SERPL-CCNC: 70 U/L (ref 45–117)
ALT SERPL-CCNC: 17 U/L (ref 13–56)
ANION GAP SERPL CALC-SCNC: 7 MMOL/L (ref 3–18)
APPEARANCE UR: CLEAR
AST SERPL-CCNC: 11 U/L (ref 10–38)
BACTERIA URNS QL MICRO: NEGATIVE /HPF
BASOPHILS # BLD: 0 K/UL (ref 0–0.1)
BASOPHILS NFR BLD: 1 % (ref 0–2)
BILIRUB SERPL-MCNC: 0.2 MG/DL (ref 0.2–1)
BILIRUB UR QL: NEGATIVE
BUN SERPL-MCNC: 10 MG/DL (ref 7–18)
BUN/CREAT SERPL: 14 (ref 12–20)
CALCIUM SERPL-MCNC: 8.6 MG/DL (ref 8.5–10.1)
CHLORIDE SERPL-SCNC: 111 MMOL/L (ref 100–111)
CO2 SERPL-SCNC: 23 MMOL/L (ref 21–32)
COLOR UR: YELLOW
CREAT SERPL-MCNC: 0.71 MG/DL (ref 0.6–1.3)
DIFFERENTIAL METHOD BLD: ABNORMAL
EOSINOPHIL # BLD: 0 K/UL (ref 0–0.4)
EOSINOPHIL NFR BLD: 1 % (ref 0–5)
EPITH CASTS URNS QL MICRO: NORMAL /LPF (ref 0–5)
ERYTHROCYTE [DISTWIDTH] IN BLOOD BY AUTOMATED COUNT: 12.4 % (ref 11.6–14.5)
GLOBULIN SER CALC-MCNC: 3.9 G/DL (ref 2–4)
GLUCOSE SERPL-MCNC: 97 MG/DL (ref 74–99)
GLUCOSE UR STRIP.AUTO-MCNC: NEGATIVE MG/DL
HCG UR QL: NEGATIVE
HCT VFR BLD AUTO: 34.5 % (ref 35–45)
HGB BLD-MCNC: 12 G/DL (ref 12–16)
HGB UR QL STRIP: ABNORMAL
IMM GRANULOCYTES # BLD AUTO: 0 K/UL (ref 0–0.04)
IMM GRANULOCYTES NFR BLD AUTO: 0 % (ref 0–0.5)
KETONES UR QL STRIP.AUTO: NEGATIVE MG/DL
LEUKOCYTE ESTERASE UR QL STRIP.AUTO: ABNORMAL
LIPASE SERPL-CCNC: 48 U/L (ref 13–75)
LYMPHOCYTES # BLD: 2.4 K/UL (ref 0.9–3.6)
LYMPHOCYTES NFR BLD: 54 % (ref 21–52)
MCH RBC QN AUTO: 29.9 PG (ref 24–34)
MCHC RBC AUTO-ENTMCNC: 34.8 G/DL (ref 31–37)
MCV RBC AUTO: 86 FL (ref 78–100)
MONOCYTES # BLD: 0.3 K/UL (ref 0.05–1.2)
MONOCYTES NFR BLD: 8 % (ref 3–10)
NEUTS SEG # BLD: 1.6 K/UL (ref 1.8–8)
NEUTS SEG NFR BLD: 37 % (ref 40–73)
NITRITE UR QL STRIP.AUTO: NEGATIVE
NRBC # BLD: 0 K/UL (ref 0–0.01)
NRBC BLD-RTO: 0 PER 100 WBC
PH UR STRIP: 5.5 (ref 5–8)
PLATELET # BLD AUTO: 275 K/UL (ref 135–420)
PMV BLD AUTO: 9.9 FL (ref 9.2–11.8)
POTASSIUM SERPL-SCNC: 3.3 MMOL/L (ref 3.5–5.5)
PROT SERPL-MCNC: 7.1 G/DL (ref 6.4–8.2)
PROT UR STRIP-MCNC: NEGATIVE MG/DL
RBC # BLD AUTO: 4.01 M/UL (ref 4.2–5.3)
RBC #/AREA URNS HPF: NORMAL /HPF (ref 0–5)
SODIUM SERPL-SCNC: 141 MMOL/L (ref 136–145)
SP GR UR REFRACTOMETRY: 1.01 (ref 1–1.03)
UROBILINOGEN UR QL STRIP.AUTO: 0.2 EU/DL (ref 0.2–1)
WBC # BLD AUTO: 4.4 K/UL (ref 4.6–13.2)
WBC URNS QL MICRO: NORMAL /HPF (ref 0–4)

## 2024-05-05 PROCEDURE — 2580000003 HC RX 258: Performed by: EMERGENCY MEDICINE

## 2024-05-05 PROCEDURE — 81025 URINE PREGNANCY TEST: CPT

## 2024-05-05 PROCEDURE — 85025 COMPLETE CBC W/AUTO DIFF WBC: CPT

## 2024-05-05 PROCEDURE — 74177 CT ABD & PELVIS W/CONTRAST: CPT

## 2024-05-05 PROCEDURE — 96375 TX/PRO/DX INJ NEW DRUG ADDON: CPT

## 2024-05-05 PROCEDURE — 83690 ASSAY OF LIPASE: CPT

## 2024-05-05 PROCEDURE — 6360000002 HC RX W HCPCS: Performed by: EMERGENCY MEDICINE

## 2024-05-05 PROCEDURE — 80053 COMPREHEN METABOLIC PANEL: CPT

## 2024-05-05 PROCEDURE — 6360000004 HC RX CONTRAST MEDICATION: Performed by: EMERGENCY MEDICINE

## 2024-05-05 PROCEDURE — 81001 URINALYSIS AUTO W/SCOPE: CPT

## 2024-05-05 PROCEDURE — 96374 THER/PROPH/DIAG INJ IV PUSH: CPT

## 2024-05-05 RX ORDER — MORPHINE SULFATE 2 MG/ML
2 INJECTION, SOLUTION INTRAMUSCULAR; INTRAVENOUS
Status: COMPLETED | OUTPATIENT
Start: 2024-05-05 | End: 2024-05-05

## 2024-05-05 RX ORDER — 0.9 % SODIUM CHLORIDE 0.9 %
1000 INTRAVENOUS SOLUTION INTRAVENOUS ONCE
Status: DISCONTINUED | OUTPATIENT
Start: 2024-05-05 | End: 2024-05-05

## 2024-05-05 RX ORDER — 0.9 % SODIUM CHLORIDE 0.9 %
1000 INTRAVENOUS SOLUTION INTRAVENOUS ONCE
Status: COMPLETED | OUTPATIENT
Start: 2024-05-05 | End: 2024-05-05

## 2024-05-05 RX ORDER — MORPHINE SULFATE 2 MG/ML
2 INJECTION, SOLUTION INTRAMUSCULAR; INTRAVENOUS
Status: DISCONTINUED | OUTPATIENT
Start: 2024-05-05 | End: 2024-05-05

## 2024-05-05 RX ORDER — POLYETHYLENE GLYCOL 3350 17 G/17G
17 POWDER, FOR SOLUTION ORAL DAILY PRN
Qty: 510 G | Refills: 0 | Status: SHIPPED | OUTPATIENT
Start: 2024-05-05 | End: 2024-06-04

## 2024-05-05 RX ORDER — MORPHINE SULFATE 2 MG/ML
2 INJECTION, SOLUTION INTRAMUSCULAR; INTRAVENOUS
Status: DISCONTINUED | OUTPATIENT
Start: 2024-05-05 | End: 2024-05-05 | Stop reason: HOSPADM

## 2024-05-05 RX ORDER — ONDANSETRON 2 MG/ML
4 INJECTION INTRAMUSCULAR; INTRAVENOUS
Status: COMPLETED | OUTPATIENT
Start: 2024-05-05 | End: 2024-05-05

## 2024-05-05 RX ORDER — ENEMA 19; 7 G/133ML; G/133ML
3 ENEMA RECTAL
Qty: 1 EACH | Refills: 2 | Status: SHIPPED | OUTPATIENT
Start: 2024-05-05 | End: 2024-05-05

## 2024-05-05 RX ORDER — DOCUSATE SODIUM 100 MG/1
100 CAPSULE, LIQUID FILLED ORAL 2 TIMES DAILY
Qty: 60 CAPSULE | Refills: 0 | Status: SHIPPED | OUTPATIENT
Start: 2024-05-05 | End: 2024-06-04

## 2024-05-05 RX ADMIN — IOPAMIDOL 100 ML: 612 INJECTION, SOLUTION INTRAVENOUS at 01:34

## 2024-05-05 RX ADMIN — SODIUM CHLORIDE 1000 ML: 9 INJECTION, SOLUTION INTRAVENOUS at 01:07

## 2024-05-05 RX ADMIN — MORPHINE SULFATE 2 MG: 2 INJECTION, SOLUTION INTRAMUSCULAR; INTRAVENOUS at 01:10

## 2024-05-05 RX ADMIN — ONDANSETRON 4 MG: 2 INJECTION INTRAMUSCULAR; INTRAVENOUS at 01:08

## 2024-05-05 ASSESSMENT — PAIN - FUNCTIONAL ASSESSMENT
PAIN_FUNCTIONAL_ASSESSMENT: ACTIVITIES ARE NOT PREVENTED
PAIN_FUNCTIONAL_ASSESSMENT: 0-10

## 2024-05-05 ASSESSMENT — PAIN DESCRIPTION - ORIENTATION
ORIENTATION: RIGHT;LEFT
ORIENTATION: LEFT;RIGHT;LOWER
ORIENTATION: RIGHT;LEFT

## 2024-05-05 ASSESSMENT — PAIN DESCRIPTION - FREQUENCY: FREQUENCY: CONTINUOUS

## 2024-05-05 ASSESSMENT — PAIN SCALES - GENERAL
PAINLEVEL_OUTOF10: 10
PAINLEVEL_OUTOF10: 10
PAINLEVEL_OUTOF10: 4

## 2024-05-05 ASSESSMENT — PAIN DESCRIPTION - LOCATION
LOCATION: ABDOMEN
LOCATION: ABDOMEN;FLANK
LOCATION: ABDOMEN;FLANK

## 2024-05-05 ASSESSMENT — PAIN DESCRIPTION - DESCRIPTORS: DESCRIPTORS: ACHING

## 2024-05-05 ASSESSMENT — PAIN DESCRIPTION - PAIN TYPE: TYPE: ACUTE PAIN

## 2024-05-05 ASSESSMENT — PAIN DESCRIPTION - ONSET: ONSET: AWAKENED FROM SLEEP

## 2024-05-05 NOTE — ED NOTES
Discharge teaching provided to pt regarding treatment received, medications prescribed, and follow-up care. Pt verbalized understanding directions and follow up care. Pt left ambulatory with steady gait, NAD and with discharge paperwork in hand.

## 2024-05-05 NOTE — ED TRIAGE NOTES
Pt c/o bilateral flank pain and middle abdominal pain since 1 hour ago.    Pt has Hx of Gastric Bypass.    Past Medical History:   Diagnosis Date    Anal fissure     Chronic pain     Dyslipidemia 04/25/2022    Exercise tolerance finding 05/01/2023    Probably not able to go up and down two flights of stairs without SOB but no chest pain. Able to walk a mile without SOB or chest pain on level ground    GERD (gastroesophageal reflux disease)     avoids food triggers, ginger ale TUMS    Morbid (severe) obesity due to excess calories (HCC)     Morbid obesity with body mass index (BMI) of 40.0 to 49.9 (HCC)     Primary hypertension 09/29/2022    Refraction disorder     Supposed to wear glasses but does not.     Vitamin D deficiency 04/25/2022     Pt ambulated to the room with a steady gait.

## 2024-05-05 NOTE — ED PROVIDER NOTES
[facility] EMERGENCY DEPT  eMERGENCY dEPARTMENT eNCOUnter      Pt Name: Sophy Ceballos  MRN: [id number]  Birthdate 1985 of evaluation: 5/4/2024  Provider:Zack Potter MD    CHIEF COMPLAINT       HPI    Sophy Ceballos is a 38 y.o. female  c/o bilateral lower back pain and abdominal pain x 1 day.  No dysuria or hematuria.  Patient history of gastric bypass    ROS  Review of Systems   Constitutional:  Positive for activity change.   HENT: Negative.     Respiratory: Negative.     Cardiovascular: Negative.    Gastrointestinal:  Positive for abdominal pain. Negative for nausea and vomiting.   Genitourinary:  Negative for difficulty urinating and dysuria.   Musculoskeletal: Negative.    Neurological: Negative.    Psychiatric/Behavioral: Negative.         Except as noted above the remainder of the review of systems was reviewed and negative.       PAST MEDICAL HISTORY     Past Medical History:   Diagnosis Date    Anal fissure     Chronic pain     Dyslipidemia 04/25/2022    Exercise tolerance finding 05/01/2023    Probably not able to go up and down two flights of stairs without SOB but no chest pain. Able to walk a mile without SOB or chest pain on level ground    GERD (gastroesophageal reflux disease)     avoids food triggers, ginger ale TUMS    Morbid (severe) obesity due to excess calories (HCC)     Morbid obesity with body mass index (BMI) of 40.0 to 49.9 (HCC)     Primary hypertension 09/29/2022    Refraction disorder     Supposed to wear glasses but does not.     Vitamin D deficiency 04/25/2022         SURGICAL HISTORY       Past Surgical History:   Procedure Laterality Date    CHOLECYSTECTOMY, LAPAROSCOPIC N/A 5/18/2023    LAPAROSCOPIC CHOLECYSTECTOMY INTEPERATIVE CHOLANGIOGRAM WITH C-ARM performed by Fabien Canales MD at [facility] MAIN OR    FLEXIBLE SIGMOIDOSCOPY N/A 3/12/2021    SIGMOIDOSCOPY FLEXIBLE with bxs performed by Qeuntin Wilson MD at [facility] ENDOSCOPY    HEMORRHOID SURGERY      RUSSELL-EN-Y

## 2024-05-05 NOTE — ED NOTES
Pt in fetal position on stretcher. States pain is still 10/10 pain. MD notified. Pain medication orders placed. Pt to Ct via stretcher. States that she wanted to go to CT while waiting on verification of pain medication. MD aware of all.

## 2024-05-05 NOTE — ED NOTES
Pt c/o bilateral flank pain and lower abdominal pain that started tonight at 10PM. Pt reports a hx of UTI and symptoms feel similar. Pt resting comfortably on stretcher in NAD. Denies N/V/D or any other urinary symptoms.

## 2024-05-05 NOTE — ED NOTES
Pt returned from CT resting comfortably on stretcher. Pt states that she passed gas while in CT and is feeling better. Pain is reducing 8/10. Pt reports having two 16 oz cups mixed drinks and a shot of alcohol before coming to ED. Pt has a hx of gastric bypass. MD notified of all. Verbal orders given to hold second dose of morphine at this time.

## 2024-05-08 ENCOUNTER — OFFICE VISIT (OUTPATIENT)
Age: 39
End: 2024-05-08
Payer: COMMERCIAL

## 2024-05-08 VITALS
HEART RATE: 73 BPM | BODY MASS INDEX: 31.72 KG/M2 | WEIGHT: 190.4 LBS | HEIGHT: 65 IN | DIASTOLIC BLOOD PRESSURE: 76 MMHG | OXYGEN SATURATION: 100 % | TEMPERATURE: 97.4 F | SYSTOLIC BLOOD PRESSURE: 119 MMHG

## 2024-05-08 DIAGNOSIS — Z98.84 S/P GASTRIC BYPASS: ICD-10-CM

## 2024-05-08 DIAGNOSIS — K90.9 INTESTINAL MALABSORPTION, UNSPECIFIED TYPE: Primary | ICD-10-CM

## 2024-05-08 PROCEDURE — 3078F DIAST BP <80 MM HG: CPT | Performed by: NURSE PRACTITIONER

## 2024-05-08 PROCEDURE — 99214 OFFICE O/P EST MOD 30 MIN: CPT | Performed by: NURSE PRACTITIONER

## 2024-05-08 PROCEDURE — 3074F SYST BP LT 130 MM HG: CPT | Performed by: NURSE PRACTITIONER

## 2024-05-08 NOTE — PATIENT INSTRUCTIONS
goody powder, BC powder, Motrin, Advil, Mobic, Voltaren, Excedrin, etc.)  - Steroid pills or injections  - Smoke (cigarettes or recreational drugs)  - Alcohol  Use of any of the above may cause ulcers in your stomach which may perforate causing a medical emergency and surgery. Speak to our medical staff if another medical provider requires you to take steroids or NSAIDs.          Supplement Resource Guide    Importance of Protein:   Maintains lean body mass, produces antibodies to fight off infections, heals wounds, minimizes hair loss, helps to give you energy, helps with satiety, and keeping you full between meals.    Importance of Calcium:  Needed for healthy bones and teeth, normal blood clotting, and nervous system functioning, higher risk of osteoporosis and bone disease with non-compliance.    Importance of Multivitamins:  Many functions.  Supply you with extra nutrients that you may be missing from food.  May lead to iron deficiency anemia, weakness, fatigue, and many other symptoms with non-compliance.    Importance of B Vitamins:  Important for red blood cell formation, metabolism, energy, and helps to maintain a healthy nervous system.    Protein Supplement  Find one you like now. Use immediately after surgery.   Look for:  35-50g protein each day from your protein supplement once you reach the progression diet.      0-3 g fat per serving  0-3 g sugar per serving    Protein drinks should be split in separate dosages.    Recommend: Lifelong  1 year + Calcium Supplement:     Start taking within a month after surgery.   Look for: Calcium Citrate Plus D (1500 mg per day)  Recommend: Citracal     .            Avoid chocolate chewable calcium. Can use chewable bariatric or [employer] brand or similar chewable.    The body cannot absorb more than 500-600 mg of calcium at a time.      Take for Life Multi-vitamin Supplement:      Start immediately after surgery: any complete chewable, such as: Shokan’s Complete

## 2024-05-08 NOTE — PROGRESS NOTES
Subjective:     Sophy Ceballos  is a 38 y.o. female who presents for follow-up about 10 months following laparoscopic gastric bypass surger . She has lost a total of 72 pounds since surgery.  Body mass index is 31.68 kg/m².. EBWL is (56%). The patient presents today to assess their progress toward their goal of weight loss and to address any issues that may be present.  Today the patient and I have reviewed their diet and how appropriate their food choices are.  The following issues have been identified - none from a surgical standpoint. Last seen at 3 months postop.    Surgery related complication: NA       She reports constipation and denies vomiting, abdominal pain, difficulty swallowing, nausea and reflux.    The patients diet choices have been reviewed today and counseling was given.      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    Patients pain score:0/10      The patient's exercise level: not active.    Changes in her medical history and medications have been reviewed.    Comorbidities:    Hypertension: improved, no Rx  Diabetes: N/A  Obstructive Sleep Apnea: N/A   Hyperlipidemia: unchanged  Stress Urinary Incontinence: N/A  Gastroesophageal Reflux:improved, on PPI  Weight related arthropathy:N/A      Patient Active Problem List   Diagnosis    GERD (gastroesophageal reflux disease)    Laboratory tests ordered as part of a complete physical exam (CPE)    Vitamin D deficiency    Dyslipidemia    Primary hypertension    S/P gastric bypass    Hiatal hernia    NAFLD (nonalcoholic fatty liver disease)    Intestinal malabsorption    Constipation     Past Medical History:   Diagnosis Date    Anal fissure     Chronic pain     Dyslipidemia 04/25/2022    Exercise tolerance finding 05/01/2023    Probably not

## 2024-06-11 ENCOUNTER — HOSPITAL ENCOUNTER (EMERGENCY)
Facility: HOSPITAL | Age: 39
Discharge: HOME | End: 2024-06-11
Attending: EMERGENCY MEDICINE
Payer: MEDICAID

## 2024-06-11 VITALS
TEMPERATURE: 98.5 F | BODY MASS INDEX: 30.62 KG/M2 | DIASTOLIC BLOOD PRESSURE: 88 MMHG | RESPIRATION RATE: 18 BRPM | HEART RATE: 73 BPM | WEIGHT: 184 LBS | SYSTOLIC BLOOD PRESSURE: 138 MMHG | OXYGEN SATURATION: 99 %

## 2024-06-11 DIAGNOSIS — J06.9 UPPER RESPIRATORY TRACT INFECTION, UNSPECIFIED TYPE: Primary | ICD-10-CM

## 2024-06-11 PROCEDURE — 99283 EMERGENCY DEPT VISIT LOW MDM: CPT

## 2024-06-11 RX ORDER — ACETAMINOPHEN 500 MG
1000 TABLET ORAL EVERY 6 HOURS PRN
Qty: 12 TABLET | Refills: 0 | Status: SHIPPED | OUTPATIENT
Start: 2024-06-11

## 2024-06-11 NOTE — ED PROVIDER NOTES
EMERGENCY DEPARTMENT HISTORY AND PHYSICAL EXAM    7:21 AM EDT seen at this time and QA        Date: 6/11/2024  Patient Name: Spohy Ceballos    History of Presenting Illness     Chief Complaint   Patient presents with    Generalized Body Aches    Cough    Congestion    Sore Throat         History Provided By: patient    Additional History (Context): Sophy Ceballos is a 38 y.o. female presents with yesterday had onset of chills cough body aches.  Has not tried any medication or therapies.  Status post gastric bypass continues taking vitamins takes protein shakes and avoids NSAIDs.  No other medical problems or allergies..    PCP: Kerry Jurado DNP    Chief Complaint:   Duration:    Timing:    Location:   Quality:   Severity:   Modifying Factors:   Associated Symptoms:       No current facility-administered medications for this encounter.     Current Outpatient Medications   Medication Sig Dispense Refill    acetaminophen (TYLENOL) 500 MG tablet Take 2 tablets by mouth every 6 hours as needed for Pain 12 tablet 0    Calcium Polycarbophil (FIBER) 625 MG TABS Take 1 tablet by mouth daily 30 tablet 11    Prenatal Vit-Fe Fumarate-FA (PRENATAL 1+1 PO) Take by mouth      PANTERA 0.35 MG tablet       calcium carbonate (OSCAL) 500 MG TABS tablet Take 1 tablet by mouth daily      Cholecalciferol (VITAMIN D) 50 MCG (2000 UT) CAPS capsule Take by mouth      b complex vitamins capsule Take 1 capsule by mouth daily      Cyanocobalamin (B-12) 1000 MCG SUBL Place under the tongue         Past History     Past Medical History:  Past Medical History:   Diagnosis Date    Anal fissure     Chronic pain     Dyslipidemia 04/25/2022    Exercise tolerance finding 05/01/2023    Probably not able to go up and down two flights of stairs without SOB but no chest pain. Able to walk a mile without SOB or chest pain on level ground    GERD (gastroesophageal reflux disease)     avoids food triggers, ginger ale TUMS    Morbid (severe)

## 2024-06-11 NOTE — ED TRIAGE NOTES
Patient A/O x 4, presented to the ED with complaint of sore throat, cough, body aches, congestion x 1 day.

## 2024-06-14 ENCOUNTER — HOSPITAL ENCOUNTER (EMERGENCY)
Facility: HOSPITAL | Age: 39
Discharge: HOME | End: 2024-06-14
Attending: EMERGENCY MEDICINE
Payer: MEDICAID

## 2024-06-14 VITALS
OXYGEN SATURATION: 100 % | HEART RATE: 81 BPM | RESPIRATION RATE: 18 BRPM | SYSTOLIC BLOOD PRESSURE: 122 MMHG | HEIGHT: 65 IN | DIASTOLIC BLOOD PRESSURE: 80 MMHG | WEIGHT: 184 LBS | BODY MASS INDEX: 30.66 KG/M2 | TEMPERATURE: 98.2 F

## 2024-06-14 DIAGNOSIS — U07.1 COVID-19: Primary | ICD-10-CM

## 2024-06-14 LAB
FLUAV AG NPH QL IA: NEGATIVE
FLUBV AG NOSE QL IA: NEGATIVE
SARS-COV-2 RDRP RESP QL NAA+PROBE: DETECTED
SOURCE: ABNORMAL

## 2024-06-14 PROCEDURE — 87804 INFLUENZA ASSAY W/OPTIC: CPT

## 2024-06-14 PROCEDURE — 99283 EMERGENCY DEPT VISIT LOW MDM: CPT

## 2024-06-14 PROCEDURE — 87635 SARS-COV-2 COVID-19 AMP PRB: CPT

## 2024-06-14 ASSESSMENT — ENCOUNTER SYMPTOMS
RESPIRATORY NEGATIVE: 1
SORE THROAT: 1

## 2024-06-14 ASSESSMENT — PAIN - FUNCTIONAL ASSESSMENT: PAIN_FUNCTIONAL_ASSESSMENT: NONE - DENIES PAIN

## 2024-06-15 NOTE — ED TRIAGE NOTES
Pt c/o cough, headache, and nasal congestion since Monday.    Pt stated \"that she was seen here on Monday for the same issue and has not gotten any better\".    Past Medical History:   Diagnosis Date    Anal fissure     Chronic pain     Dyslipidemia 04/25/2022    Exercise tolerance finding 05/01/2023    Probably not able to go up and down two flights of stairs without SOB but no chest pain. Able to walk a mile without SOB or chest pain on level ground    GERD (gastroesophageal reflux disease)     avoids food triggers, ginger ale TUMS    Morbid (severe) obesity due to excess calories (HCC)     Morbid obesity with body mass index (BMI) of 40.0 to 49.9 (HCC)     Primary hypertension 09/29/2022    Refraction disorder     Supposed to wear glasses but does not.     Vitamin D deficiency 04/25/2022     Pt ambulated to the room with a steady gait.

## 2024-06-15 NOTE — ED NOTES
Patient discharged at this time. This RN reviewed discharge instructions at this time with the patient.  patient verbalized understanding and does not have any questions. Pt ambulatory upon discharge, & in stable condition. Pt armband removed & shredded. Patient is ambulatory at discharged.

## 2024-07-11 ENCOUNTER — HOSPITAL ENCOUNTER (EMERGENCY)
Facility: HOSPITAL | Age: 39
Discharge: HOME | End: 2024-07-12
Attending: EMERGENCY MEDICINE
Payer: MEDICAID

## 2024-07-11 ENCOUNTER — APPOINTMENT (OUTPATIENT)
Facility: HOSPITAL | Age: 39
End: 2024-07-11
Payer: MEDICAID

## 2024-07-11 DIAGNOSIS — R10.9 LEFT SIDED ABDOMINAL PAIN: Primary | ICD-10-CM

## 2024-07-11 DIAGNOSIS — Z98.84 HISTORY OF ROUX-EN-Y GASTRIC BYPASS: ICD-10-CM

## 2024-07-11 DIAGNOSIS — K59.00 CONSTIPATION, UNSPECIFIED CONSTIPATION TYPE: ICD-10-CM

## 2024-07-11 LAB
ANION GAP SERPL CALC-SCNC: 5 MMOL/L (ref 3–18)
APPEARANCE UR: CLEAR
BACTERIA URNS QL MICRO: ABNORMAL /HPF
BASOPHILS # BLD: 0 K/UL (ref 0–0.1)
BASOPHILS NFR BLD: 0 % (ref 0–2)
BILIRUB UR QL: NEGATIVE
BUN SERPL-MCNC: 12 MG/DL (ref 7–18)
BUN/CREAT SERPL: 19 (ref 12–20)
CALCIUM SERPL-MCNC: 9 MG/DL (ref 8.5–10.1)
CHLORIDE SERPL-SCNC: 106 MMOL/L (ref 100–111)
CO2 SERPL-SCNC: 28 MMOL/L (ref 21–32)
COLOR UR: YELLOW
CREAT SERPL-MCNC: 0.62 MG/DL (ref 0.6–1.3)
DIFFERENTIAL METHOD BLD: ABNORMAL
EOSINOPHIL # BLD: 0.1 K/UL (ref 0–0.4)
EOSINOPHIL NFR BLD: 1 % (ref 0–5)
EPITH CASTS URNS QL MICRO: ABNORMAL /LPF (ref 0–5)
ERYTHROCYTE [DISTWIDTH] IN BLOOD BY AUTOMATED COUNT: 11.9 % (ref 11.6–14.5)
GLUCOSE SERPL-MCNC: 93 MG/DL (ref 74–99)
GLUCOSE UR STRIP.AUTO-MCNC: NEGATIVE MG/DL
HCG UR QL: NEGATIVE
HCT VFR BLD AUTO: 35.3 % (ref 35–45)
HGB BLD-MCNC: 11.9 G/DL (ref 12–16)
HGB UR QL STRIP: ABNORMAL
IMM GRANULOCYTES # BLD AUTO: 0 K/UL (ref 0–0.04)
IMM GRANULOCYTES NFR BLD AUTO: 0 % (ref 0–0.5)
KETONES UR QL STRIP.AUTO: NEGATIVE MG/DL
LEUKOCYTE ESTERASE UR QL STRIP.AUTO: NEGATIVE
LYMPHOCYTES # BLD: 2.2 K/UL (ref 0.9–3.6)
LYMPHOCYTES NFR BLD: 39 % (ref 21–52)
MAGNESIUM SERPL-MCNC: 1.9 MG/DL (ref 1.6–2.6)
MCH RBC QN AUTO: 29.5 PG (ref 24–34)
MCHC RBC AUTO-ENTMCNC: 33.7 G/DL (ref 31–37)
MCV RBC AUTO: 87.4 FL (ref 78–100)
MONOCYTES # BLD: 0.4 K/UL (ref 0.05–1.2)
MONOCYTES NFR BLD: 7 % (ref 3–10)
NEUTS SEG # BLD: 3 K/UL (ref 1.8–8)
NEUTS SEG NFR BLD: 52 % (ref 40–73)
NITRITE UR QL STRIP.AUTO: NEGATIVE
NRBC # BLD: 0 K/UL (ref 0–0.01)
NRBC BLD-RTO: 0 PER 100 WBC
PH UR STRIP: 6 (ref 5–8)
PLATELET # BLD AUTO: 300 K/UL (ref 135–420)
PMV BLD AUTO: 9.6 FL (ref 9.2–11.8)
POTASSIUM SERPL-SCNC: 4.1 MMOL/L (ref 3.5–5.5)
PROT UR STRIP-MCNC: NEGATIVE MG/DL
RBC # BLD AUTO: 4.04 M/UL (ref 4.2–5.3)
RBC #/AREA URNS HPF: ABNORMAL /HPF (ref 0–5)
SODIUM SERPL-SCNC: 139 MMOL/L (ref 136–145)
SP GR UR REFRACTOMETRY: 1.02 (ref 1–1.03)
UROBILINOGEN UR QL STRIP.AUTO: 0.2 EU/DL (ref 0.2–1)
WBC # BLD AUTO: 5.7 K/UL (ref 4.6–13.2)
WBC URNS QL MICRO: ABNORMAL /HPF (ref 0–5)

## 2024-07-11 PROCEDURE — 96374 THER/PROPH/DIAG INJ IV PUSH: CPT

## 2024-07-11 PROCEDURE — 2580000003 HC RX 258: Performed by: EMERGENCY MEDICINE

## 2024-07-11 PROCEDURE — 85025 COMPLETE CBC W/AUTO DIFF WBC: CPT

## 2024-07-11 PROCEDURE — 81025 URINE PREGNANCY TEST: CPT

## 2024-07-11 PROCEDURE — 83735 ASSAY OF MAGNESIUM: CPT

## 2024-07-11 PROCEDURE — 74019 RADEX ABDOMEN 2 VIEWS: CPT

## 2024-07-11 PROCEDURE — 96361 HYDRATE IV INFUSION ADD-ON: CPT

## 2024-07-11 PROCEDURE — 99284 EMERGENCY DEPT VISIT MOD MDM: CPT

## 2024-07-11 PROCEDURE — 6370000000 HC RX 637 (ALT 250 FOR IP): Performed by: EMERGENCY MEDICINE

## 2024-07-11 PROCEDURE — 80048 BASIC METABOLIC PNL TOTAL CA: CPT

## 2024-07-11 PROCEDURE — 81001 URINALYSIS AUTO W/SCOPE: CPT

## 2024-07-11 RX ORDER — 0.9 % SODIUM CHLORIDE 0.9 %
1000 INTRAVENOUS SOLUTION INTRAVENOUS ONCE
Status: COMPLETED | OUTPATIENT
Start: 2024-07-11 | End: 2024-07-12

## 2024-07-11 RX ORDER — DICYCLOMINE HYDROCHLORIDE 10 MG/1
20 CAPSULE ORAL
Status: COMPLETED | OUTPATIENT
Start: 2024-07-11 | End: 2024-07-11

## 2024-07-11 RX ADMIN — DICYCLOMINE HYDROCHLORIDE 20 MG: 10 CAPSULE ORAL at 21:42

## 2024-07-11 RX ADMIN — SODIUM CHLORIDE 1000 ML: 9 INJECTION, SOLUTION INTRAVENOUS at 21:43

## 2024-07-11 ASSESSMENT — PAIN DESCRIPTION - ORIENTATION: ORIENTATION: RIGHT;LEFT

## 2024-07-11 ASSESSMENT — PAIN SCALES - GENERAL: PAINLEVEL_OUTOF10: 7

## 2024-07-11 ASSESSMENT — PAIN DESCRIPTION - LOCATION: LOCATION: ABDOMEN

## 2024-07-12 VITALS
HEART RATE: 79 BPM | DIASTOLIC BLOOD PRESSURE: 67 MMHG | RESPIRATION RATE: 16 BRPM | OXYGEN SATURATION: 100 % | BODY MASS INDEX: 30.62 KG/M2 | TEMPERATURE: 98 F | WEIGHT: 184 LBS | SYSTOLIC BLOOD PRESSURE: 111 MMHG

## 2024-07-12 PROCEDURE — 6360000002 HC RX W HCPCS: Performed by: EMERGENCY MEDICINE

## 2024-07-12 RX ORDER — POLYETHYLENE GLYCOL 3350 17 G/17G
17 POWDER, FOR SOLUTION ORAL 2 TIMES DAILY
Qty: 765 G | Refills: 1 | Status: SHIPPED | OUTPATIENT
Start: 2024-07-12 | End: 2024-08-11

## 2024-07-12 RX ORDER — DICYCLOMINE HCL 20 MG
20 TABLET ORAL 3 TIMES DAILY PRN
Qty: 60 TABLET | Refills: 0 | Status: SHIPPED | OUTPATIENT
Start: 2024-07-12

## 2024-07-12 RX ORDER — MAGNESIUM CITRATE
300 SOLUTION, ORAL ORAL ONCE
Qty: 300 ML | Refills: 0 | Status: SHIPPED | OUTPATIENT
Start: 2024-07-12 | End: 2024-07-12

## 2024-07-12 RX ORDER — KETOROLAC TROMETHAMINE 15 MG/ML
15 INJECTION, SOLUTION INTRAMUSCULAR; INTRAVENOUS
Status: COMPLETED | OUTPATIENT
Start: 2024-07-12 | End: 2024-07-12

## 2024-07-12 RX ADMIN — KETOROLAC TROMETHAMINE 15 MG: 15 INJECTION, SOLUTION INTRAMUSCULAR; INTRAVENOUS at 00:58

## 2024-07-12 ASSESSMENT — PAIN DESCRIPTION - ORIENTATION: ORIENTATION: LEFT;RIGHT;ANTERIOR

## 2024-07-12 ASSESSMENT — PAIN SCALES - GENERAL
PAINLEVEL_OUTOF10: 1
PAINLEVEL_OUTOF10: 7

## 2024-07-12 ASSESSMENT — PAIN DESCRIPTION - LOCATION
LOCATION: ABDOMEN
LOCATION: ABDOMEN

## 2024-07-12 NOTE — ED TRIAGE NOTES
Patient arrived to the Ed from home with complaints of abdominal pain radiating to her sides that started Monday. Denies nausea, vomiting, and diarrhea. Complaints of constipation. Alert and oriented. NAD.

## 2024-07-12 NOTE — ED PROVIDER NOTES
Diagnosis and Disposition       DISCHARGE NOTE:  ***  Sophy Ceballos's  results have been reviewed with her.  She has been counseled regarding her diagnosis, treatment, and plan.  She verbally conveys understanding and agreement of the signs, symptoms, diagnosis, treatment and prognosis and additionally agrees to follow up as discussed.  She also agrees with the care-plan and conveys that all of her questions have been answered.  I have also provided discharge instructions for her that include: educational information regarding their diagnosis and treatment, and list of reasons why they would want to return to the ED prior to their follow-up appointment, should her condition change. She has been provided with education for proper emergency department utilization.     CLINICAL IMPRESSION:    1. Left sided abdominal pain    2. Constipation, unspecified constipation type        PLAN:  1. D/C Home  2.      Medication List        START taking these medications      dicyclomine 20 MG tablet  Commonly known as: BENTYL  Take 1 tablet by mouth 3 times daily as needed (abdominal cramping)     magnesium citrate Soln  Commonly known as: CITROMA  Take 300 mLs by mouth once for 1 dose May replace with powdered mag citrate if liquid preparation is unavailable     polyethylene glycol 17 GM/SCOOP powder  Commonly known as: GLYCOLAX  Take 17 g by mouth 2 times daily     Simethicone 125 MG Tabs  Take 125 mg by mouth 3 times daily as needed (gas pains)            ASK your doctor about these medications      acetaminophen 500 MG tablet  Commonly known as: TYLENOL  Take 2 tablets by mouth every 6 hours as needed for Pain     b complex vitamins capsule     B-12 1000 MCG Subl     calcium carbonate 500 MG Tabs tablet  Commonly known as: OSCAL     Fiber 625 MG Tabs  Take 1 tablet by mouth daily     Brittny 0.35 MG tablet  Generic drug: norethindrone     PRENATAL 1+1 PO     vitamin D 50 MCG (2000 UT) Caps capsule               Where  Your Medications        These medications were sent to [facility] [id number] - [place], [place] - [address] - P [phone] - F [phone]  [address] [address]      Phone: [phone]   dicyclomine 20 MG tablet  magnesium citrate Soln  polyethylene glycol 17 GM/SCOOP powder  Simethicone 125 MG Tabs       3. [unfilled]  _______________________________    This note was partially transcribed via voice recognition software. Although efforts have been made to catch any discrepancies, it may contain sound alike words, grammatical errors, or nonsensical words.             Víctor Canales MD  07/18/24 0942

## 2024-08-14 ENCOUNTER — HOSPITAL ENCOUNTER (OUTPATIENT)
Facility: HOSPITAL | Age: 39
Discharge: HOME | End: 2024-08-17

## 2024-08-14 ENCOUNTER — OFFICE VISIT (OUTPATIENT)
Age: 39
End: 2024-08-14
Payer: MEDICAID

## 2024-08-14 VITALS
OXYGEN SATURATION: 100 % | BODY MASS INDEX: 30.97 KG/M2 | HEART RATE: 68 BPM | DIASTOLIC BLOOD PRESSURE: 74 MMHG | TEMPERATURE: 97.6 F | SYSTOLIC BLOOD PRESSURE: 119 MMHG | WEIGHT: 185.9 LBS | HEIGHT: 65 IN

## 2024-08-14 DIAGNOSIS — Z98.84 S/P GASTRIC BYPASS: ICD-10-CM

## 2024-08-14 DIAGNOSIS — K90.9 INTESTINAL MALABSORPTION, UNSPECIFIED TYPE: Primary | ICD-10-CM

## 2024-08-14 LAB — LABCORP SPECIMEN COLLECTION: NORMAL

## 2024-08-14 PROCEDURE — 99214 OFFICE O/P EST MOD 30 MIN: CPT | Performed by: NURSE PRACTITIONER

## 2024-08-14 PROCEDURE — 3078F DIAST BP <80 MM HG: CPT | Performed by: NURSE PRACTITIONER

## 2024-08-14 PROCEDURE — 3074F SYST BP LT 130 MM HG: CPT | Performed by: NURSE PRACTITIONER

## 2024-08-14 PROCEDURE — 99001 SPECIMEN HANDLING PT-LAB: CPT

## 2024-08-14 NOTE — PATIENT INSTRUCTIONS
juice (Diet Ocean Spray, Diet V8 Tropical Splash or Minute Maid zero sugar) with 4+ oz water, but no regular juice when using Miralax    Roxy Flores, NAEL-BC    Patient Instructions      Remember hydration goals - minimum of 64 ounces of liquids per day (dehydration is the number one reason for hospital readmission).  Continue to monitor carbohydrate and protein intake you need a minimum of  Grams of protein daily- remember to keep your total carbohydrates to 50 grams or less per day for best results.  Continue to work towards exercise goals - 60-90 minutes, 5 times a week minimum of deliberate, aerobic exercise is the ultimate goal with strength training 2 times each week. Refer to [employer] for  information.  Remember to take vitamins as directed.  Attend support group the 2nd Thursday of each month.  6.  Constipation: Milk of Magnesia is for immediate relief only.  Miralax is to be used every day if constipation is a chronic problem.    7.  Diarrhea: patients will occasionally develop lactose intolerance after surgery.  Check to see if your protein shake has whey in it.  If it does try a protein powder or drink that does not have whey and stop all yogurts, cheeses and milks to see if the diarrhea goes away.    8.  If you have had labs drawn.  We will only call you if you have abnormal results.  Otherwise you can access the lab results in \"[employer]\".  You will only need the access code the first time you sign on.    9.  Call us at [phone] or email us through \"[employer]\" with questions,     concerns or worsening of condition, we have someone on call 24 hours a day.  If you are unable to reach our office, you are to go to your Primary Care Physician or the Emergency Department.     NOTE TO GASTRIC BYPASS PATIENTS:  (SAME APPLIES TO GASTRIC SLEEVE PATIENTS FOR FIRST TWO MONTHS)  Remember that for the rest of your life, you are not able to take the following:  - NSAIDs (ibuprofen, goody

## 2024-08-14 NOTE — PROGRESS NOTES
VITAMIN B12 AND FOLATE:   Lab Results   Component Value Date/Time    [id number] >2000 10/12/2023 11:04 AM    FOLATE >20.0 10/12/2023 11:04 AM     IRON:   Lab Results   Component Value Date/Time    IRON 53 10/12/2023 11:04 AM     FERRITIN:   Lab Results   Component Value Date/Time    FERRITIN 46 10/12/2023 11:04 AM     VITAMIN B1:   Lab Results   Component Value Date/Time    [id number] 88.2 10/12/2023 11:04 AM     Reviewed labs in Care Everywhere from 5/5/24:  Crt 0.71  Ca 8.6  LFTs WNL  Hgb 12.0  Hct 34.5      Assessment and Plan:   Intestinal malabsorption  continue required Vitamins: B12, B complex, D, iron, calcium, multivitamin  S/p laparoscopic bariatric surgery, GASTRIC BYPASS, history of morbid obesity. Reviewed weight loss progress and she has lost 5 lbs since last visit. Her EBWL is less than expected at 1 year postop. She is committed to getting back on track. Do recommend using food tracking lotus to ensure adequate protein and caloric intake. Aim for 80-90 g protein daily and 800-1000 calories. Resume protein shakes. Decrease daily carbs below 50g. Needs to increase exercise. Multiple handouts given. Will have her meet with dietitian additional reinforcement.   Sleep goal is 7-9 hours each night. Patient education given on the effects of sleep deprivation on weight control.  Discussed patients weight loss goals and dietary choices in relation to goals.  Reminded to measure portions, continue high protein, low carbohydrate diet.  Reminded to eat regularly, to eat slowly & not to drink with meals.    Continue cardio exercise and add resistance exercises. 60-90 minutes of aerobic activity 5 days a week and strength training 2 days each week.  Encouraged to attend support group   Required fluid intake is >64oz daily of decaffeinated sugar free beverages.   3. Constipation - improving, continue to use miralax 2 times a day, can use laxative 1-2 times a week, continue fiber supplement     Labs ordered

## 2024-08-15 LAB
25(OH)D3+25(OH)D2 SERPL-MCNC: 52.4 NG/ML (ref 30–100)
FERRITIN SERPL-MCNC: 28 NG/ML (ref 15–150)
FOLATE SERPL-MCNC: >20 NG/ML
IRON SERPL-MCNC: 80 UG/DL (ref 27–159)
SPECIMEN STATUS REPORT: NORMAL
VIT B12 SERPL-MCNC: >2000 PG/ML (ref 232–1245)

## 2024-08-18 LAB — VIT B1 BLD-SCNC: 111.1 NMOL/L (ref 66.5–200)

## 2024-09-30 ENCOUNTER — HOSPITAL ENCOUNTER (EMERGENCY)
Facility: HOSPITAL | Age: 39
Discharge: HOME | End: 2024-09-30
Payer: MEDICAID

## 2024-09-30 VITALS
WEIGHT: 181 LBS | HEART RATE: 77 BPM | TEMPERATURE: 97.9 F | RESPIRATION RATE: 16 BRPM | BODY MASS INDEX: 30.16 KG/M2 | HEIGHT: 65 IN | OXYGEN SATURATION: 99 % | DIASTOLIC BLOOD PRESSURE: 88 MMHG | SYSTOLIC BLOOD PRESSURE: 127 MMHG

## 2024-09-30 DIAGNOSIS — R30.0 DYSURIA: Primary | ICD-10-CM

## 2024-09-30 DIAGNOSIS — J02.9 ACUTE PHARYNGITIS, UNSPECIFIED ETIOLOGY: ICD-10-CM

## 2024-09-30 LAB
APPEARANCE UR: CLEAR
BILIRUB UR QL: NEGATIVE
COLOR UR: YELLOW
DEPRECATED S PYO AG THROAT QL EIA: NEGATIVE
FLUAV RNA SPEC QL NAA+PROBE: NOT DETECTED
FLUBV RNA SPEC QL NAA+PROBE: NOT DETECTED
GLUCOSE UR STRIP.AUTO-MCNC: NEGATIVE MG/DL
HCG UR QL: NEGATIVE
HGB UR QL STRIP: NEGATIVE
KETONES UR QL STRIP.AUTO: NEGATIVE MG/DL
LEUKOCYTE ESTERASE UR QL STRIP.AUTO: NEGATIVE
NITRITE UR QL STRIP.AUTO: NEGATIVE
PH UR STRIP: 5.5 (ref 5–8)
PROT UR STRIP-MCNC: NEGATIVE MG/DL
SARS-COV-2 RNA RESP QL NAA+PROBE: NOT DETECTED
SOURCE: NORMAL
SP GR UR REFRACTOMETRY: 1.01 (ref 1–1.03)
UROBILINOGEN UR QL STRIP.AUTO: 1 EU/DL (ref 0.2–1)

## 2024-09-30 PROCEDURE — 87636 SARSCOV2 & INF A&B AMP PRB: CPT

## 2024-09-30 PROCEDURE — 81003 URINALYSIS AUTO W/O SCOPE: CPT

## 2024-09-30 PROCEDURE — 81025 URINE PREGNANCY TEST: CPT

## 2024-09-30 PROCEDURE — 87880 STREP A ASSAY W/OPTIC: CPT

## 2024-09-30 PROCEDURE — 87070 CULTURE OTHR SPECIMN AEROBIC: CPT

## 2024-09-30 PROCEDURE — 99283 EMERGENCY DEPT VISIT LOW MDM: CPT

## 2024-09-30 PROCEDURE — 87086 URINE CULTURE/COLONY COUNT: CPT

## 2024-09-30 RX ORDER — ACETAMINOPHEN 500 MG
500 TABLET ORAL 4 TIMES DAILY PRN
Qty: 30 TABLET | Refills: 0 | Status: SHIPPED | OUTPATIENT
Start: 2024-09-30

## 2024-09-30 ASSESSMENT — PAIN SCALES - GENERAL
PAINLEVEL_OUTOF10: 0
PAINLEVEL_OUTOF10: 0

## 2024-09-30 ASSESSMENT — PAIN - FUNCTIONAL ASSESSMENT
PAIN_FUNCTIONAL_ASSESSMENT: 0-10
PAIN_FUNCTIONAL_ASSESSMENT: 0-10

## 2024-09-30 NOTE — ED PROVIDER NOTES
EMERGENCY DEPARTMENT HISTORY AND PHYSICAL EXAM      Patient Name: Sophy Ceballos  MRN: [id number]  YOB: 1985  Provider: Rose Suarez PA-C  PCP: Kerry Jurado DNP   Time/Date of evaluation: 4:53 PM EDT on 9/30/24    History of Presenting Illness     No chief complaint on file.      History Provided By: Patient     History (Narative):   Sophy Ceballos is a 39 y.o. female with a PMHX of anal fissure, chronic pain, dyslipidemia, exercise intolerance, GERD, morbid obesity, vitamin D deficiency  who presents to the emergency department  by POV C/O multiple complaints.    Patient states that this morning she woke up with a sore throat and a dry cough.  She denies any fever congestion, or flulike symptoms.  She denies any sick contacts.  She denies any dysphagia, drooling or difficulty breathing.    Patient also states that for the past 2 days she has been having urinary frequency associated with dysuria.  Denies any hematuria any vaginal discharge, abdominal pain, nausea or vomiting.    Past Medical History:  Past Medical History:   Diagnosis Date    Anal fissure     Chronic pain     Dyslipidemia 04/25/2022    Exercise tolerance finding 05/01/2023    Probably not able to go up and down two flights of stairs without SOB but no chest pain. Able to walk a mile without SOB or chest pain on level ground    GERD (gastroesophageal reflux disease)     avoids food triggers, ginger ale TUMS    Morbid (severe) obesity due to excess calories     Morbid obesity with body mass index (BMI) of 40.0 to 49.9     Primary hypertension 09/29/2022    Refraction disorder     Supposed to wear glasses but does not.     Vitamin D deficiency 04/25/2022       Past Surgical History:  Past Surgical History:   Procedure Laterality Date    CHOLECYSTECTOMY, LAPAROSCOPIC N/A 5/18/2023    LAPAROSCOPIC CHOLECYSTECTOMY INTEPERATIVE CHOLANGIOGRAM WITH C-ARM performed by Fabien Canales MD at [facility] MAIN OR    FLEXIBLE

## 2024-09-30 NOTE — ED NOTES
Discharge instructions reviewed with patient. Patient advised to follow up as directed on discharge instructions.  Patient denies questions, needs or concerns at this time.  Patient verbalized understanding. No s/sx of distress noted.     Patient ambulated with steady gait.    Discharge instructions and paperwork given by Provider.

## 2024-09-30 NOTE — ED TRIAGE NOTES
Patient complains of sore throat starting this morning with dry cough. Patient also complains of urinary frequency and burning.    No OTC    Denies n/v/diarrhea/fever/congestion

## 2024-10-02 LAB
BACTERIA SPEC CULT: NORMAL
BACTERIA SPEC CULT: NORMAL
SERVICE CMNT-IMP: NORMAL
SERVICE CMNT-IMP: NORMAL

## 2024-10-15 ENCOUNTER — HOSPITAL ENCOUNTER (EMERGENCY)
Facility: HOSPITAL | Age: 39
Discharge: HOME | End: 2024-10-15
Payer: MEDICAID

## 2024-10-15 VITALS
BODY MASS INDEX: 27.78 KG/M2 | OXYGEN SATURATION: 99 % | TEMPERATURE: 97.8 F | HEART RATE: 77 BPM | WEIGHT: 177 LBS | SYSTOLIC BLOOD PRESSURE: 121 MMHG | DIASTOLIC BLOOD PRESSURE: 86 MMHG | RESPIRATION RATE: 16 BRPM | HEIGHT: 67 IN

## 2024-10-15 DIAGNOSIS — R05.1 ACUTE COUGH: ICD-10-CM

## 2024-10-15 DIAGNOSIS — J01.00 ACUTE NON-RECURRENT MAXILLARY SINUSITIS: Primary | ICD-10-CM

## 2024-10-15 DIAGNOSIS — R09.81 NASAL CONGESTION: ICD-10-CM

## 2024-10-15 PROCEDURE — 99283 EMERGENCY DEPT VISIT LOW MDM: CPT

## 2024-10-15 RX ORDER — GUAIFENESIN 600 MG/1
600 TABLET, EXTENDED RELEASE ORAL 2 TIMES DAILY
Qty: 30 TABLET | Refills: 0 | Status: SHIPPED | OUTPATIENT
Start: 2024-10-15 | End: 2024-10-30

## 2024-10-15 ASSESSMENT — ENCOUNTER SYMPTOMS
SHORTNESS OF BREATH: 0
GASTROINTESTINAL NEGATIVE: 1
SINUS PRESSURE: 1
PHOTOPHOBIA: 0
RHINORRHEA: 1
COUGH: 1

## 2024-10-15 ASSESSMENT — PAIN - FUNCTIONAL ASSESSMENT: PAIN_FUNCTIONAL_ASSESSMENT: NONE - DENIES PAIN

## 2024-10-15 NOTE — ED TRIAGE NOTES
Ambulatory patient reports having runny nose, chest congestion, and cough for two weeks- tested prior for flu, covid and strep, all result negative.

## 2024-10-15 NOTE — ED PROVIDER NOTES
[facility] EMERGENCY DEPT  EMERGENCY DEPARTMENT HISTORY AND PHYSICAL EXAM      Date: (Not on file)  Patient Name: Sophy Ceballos  MRN: [id number]  YOB: 1985  Date of evaluation: 10/15/2024  Provider: KRUNAL Giles NP   Note Started: 6:52 PM EDT 10/15/24      History of Presenting Illness     Chief Complaint   Patient presents with    Cough    Congestion    Nasal Congestion         History Provided By: Patient and medical chart review.    Additional History (Context): Sophy Ceballos is a 39 y.o. female with   Past Medical History:   Diagnosis Date    Anal fissure     Chronic pain     Dyslipidemia 04/25/2022    Exercise tolerance finding 05/01/2023    Probably not able to go up and down two flights of stairs without SOB but no chest pain. Able to walk a mile without SOB or chest pain on level ground    GERD (gastroesophageal reflux disease)     avoids food triggers, ginger ale TUMS    Morbid (severe) obesity due to excess calories     Morbid obesity with body mass index (BMI) of 40.0 to 49.9     Primary hypertension 09/29/2022    Refraction disorder     Supposed to wear glasses but does not.     Vitamin D deficiency 04/25/2022   who presents with  complaints of runny nose coughing congestion and sinus pressure that has been going on for a little over 2 weeks.  States she tested for flu COVID and strep and they were all negative.  Denies any nausea vomiting diarrhea.  Denies any fever, chest pain or shortness of breath.  States has been taking Robitussin minimal relief.  Patient denies any headaches or fevers.  Patient states she supercongested.    PCP: Kerry Jurado DNP    No current facility-administered medications for this encounter.     Current Outpatient Medications   Medication Sig Dispense Refill    guaiFENesin (MUCINEX) 600 MG extended release tablet Take 1 tablet by mouth 2 times daily for 15 days 30 tablet 0    amoxicillin-clavulanate (AUGMENTIN) 875-125 MG per tablet Take 1

## 2024-10-15 NOTE — DISCHARGE INSTRUCTIONS
Push fluids, use a vaporizer, use Tylenol or OTC NSAID's (Advil, Alleve etc) for fever or achiness, OTC cough suppressant/decongestants such as Robitussin CF and rest.   Take medication as prescribed. Follow-up with your primary care physician within 2 days for reassessment. Bring the results from this visit with you for their review. Return to the ED immediately for any new, worsening, or persistent symptoms, including fever, vomiting, chest pain, shortness of breath, or any other medical concerns.

## 2025-01-14 ENCOUNTER — OFFICE VISIT (OUTPATIENT)
Age: 40
End: 2025-01-14
Payer: COMMERCIAL

## 2025-01-14 VITALS
TEMPERATURE: 97.7 F | WEIGHT: 177 LBS | BODY MASS INDEX: 27.78 KG/M2 | HEIGHT: 67 IN | SYSTOLIC BLOOD PRESSURE: 119 MMHG | HEART RATE: 68 BPM | DIASTOLIC BLOOD PRESSURE: 78 MMHG | OXYGEN SATURATION: 100 %

## 2025-01-14 DIAGNOSIS — K59.00 CONSTIPATION, UNSPECIFIED CONSTIPATION TYPE: ICD-10-CM

## 2025-01-14 DIAGNOSIS — K90.9 INTESTINAL MALABSORPTION, UNSPECIFIED TYPE: Primary | ICD-10-CM

## 2025-01-14 DIAGNOSIS — Z98.84 S/P GASTRIC BYPASS: ICD-10-CM

## 2025-01-14 PROCEDURE — 3078F DIAST BP <80 MM HG: CPT | Performed by: NURSE PRACTITIONER

## 2025-01-14 PROCEDURE — 3074F SYST BP LT 130 MM HG: CPT | Performed by: NURSE PRACTITIONER

## 2025-01-14 PROCEDURE — 99214 OFFICE O/P EST MOD 30 MIN: CPT | Performed by: NURSE PRACTITIONER

## 2025-01-14 RX ORDER — BACILLUS COAGULANS/INULIN 1B-250 MG
CAPSULE ORAL
COMMUNITY

## 2025-01-14 NOTE — PATIENT INSTRUCTIONS
[employer] or My Fitness Pal Alejandra  80-90g protein  800-1000 calories   Keep carbs below 50g     Can try CALM magnesium powder  Can talk to PCP about chronic constipation medication    You need to get more aggressive with your bowel regimen. Constipation is very common for several reasons including pain medications, protein shakes, decreased intake, etc.    The medications for constipation on this list are available over-the-counter at most drug or grocery stores.     GET THINGS MOVING   TAKE 1 capful or packet of Miralax (polyethylene glycol) mixed with at least 8 ounces of water or diet juice 2 times daily, AND / OR   TAKE 1 tablet of Senna-S (sennosides / docusate) 2 times daily.   Once you are regular, you may adjust as needed (for example, stop Senna-S and continue Miralax).   If you don't have a BM for a total of 3 days, move to Step 2.     2. KEEP THINGS MOVING   INCREASE Senna-S to 2 tablets 2 times daily, AND CONTINUE Miralax, taking 1 capful or packet mixed with at least 8 ounces of water or diet juice 2 times daily   Once you are regular, you may adjust as needed.   If you don't have a BM for a total of of 5 days, begin Step 3.     3. REALLY GET THINGS MOVING   ADD 1 dose (30 ml), of Milk of Magnesia (magnesium hydroxide).   If you are able to have a BM return to Step 2 until you are done using opioids or you have constipation or diarrhea. If you don't have a BM within 8 hours,     ADD 1 tablet (10 mg) of Dulcolax (bisacodyl) OR 1 rectal suppository.   If you are able to have a bowel movement return to Step 2.   If you don't have a BM,     TAKE another dose of Milk of Magnesia and 1 tablet of Dulcolax.   If you are able to have a BM return to Step 2.   If you don't have a BM or have continued symptoms, move to Step 4.     4. REALLY, REALLY GET THINGS MOVING   TAKE ½ to 1 bottle of magnesium citrate   Once you finally have a BM, return to Step 2   If you don't have a bowel movement while you are using

## 2025-01-14 NOTE — PROGRESS NOTES
protein, low carbohydrate diet.  Reminded to eat regularly, to eat slowly & not to drink with meals.    Continue cardio exercise and add resistance exercises. 60-90 minutes of aerobic activity 5 days a week and strength training 2 days each week.  Encouraged to attend support group   Required fluid intake is >64oz daily of decaffeinated sugar free beverages.   3. Constipation -  increase fluids, continue to use miralax 2 times a day, can use laxative 1-2 times a week, consider daily fiber supplement or magnesium powder. Is aware she may need chronic constipation medication like Linzess and to f/up with PCP or GI.     Follow up in 6 months or sooner if patient has questions, concerns or worsening of condition, if unable to reach our office, patient should report to the ED.  Ms. Ceballos has a reminder for a \"due or due soon\" health maintenance. I have asked that she contact her primary care provider for a follow-up on this health maintenance.     Total time spent with patient was 30 minutes

## 2025-02-03 ENCOUNTER — HOSPITAL ENCOUNTER (OUTPATIENT)
Facility: HOSPITAL | Age: 40
Setting detail: SPECIMEN
Discharge: HOME | End: 2025-02-06

## 2025-02-03 PROCEDURE — 99001 SPECIMEN HANDLING PT-LAB: CPT

## 2025-02-04 LAB — SENTARA SPECIMEN COLLECTION: NORMAL

## 2025-02-06 ENCOUNTER — TELEPHONE (OUTPATIENT)
Facility: CLINIC | Age: 40
End: 2025-02-06

## 2025-02-06 NOTE — TELEPHONE ENCOUNTER
Patient called because she had labs collected on 2/3/2025 and [facility] Pt stated that she noticed that her WBC was low and didn't know if something was wrong. Informed pt that PCP is out of the office until 2/10/2025, however a message will be sent to the on call provider for review.

## 2025-02-09 SDOH — ECONOMIC STABILITY: FOOD INSECURITY: WITHIN THE PAST 12 MONTHS, THE FOOD YOU BOUGHT JUST DIDN'T LAST AND YOU DIDN'T HAVE MONEY TO GET MORE.: NEVER TRUE

## 2025-02-09 SDOH — ECONOMIC STABILITY: TRANSPORTATION INSECURITY
IN THE PAST 12 MONTHS, HAS THE LACK OF TRANSPORTATION KEPT YOU FROM MEDICAL APPOINTMENTS OR FROM GETTING MEDICATIONS?: NO

## 2025-02-09 SDOH — ECONOMIC STABILITY: FOOD INSECURITY: WITHIN THE PAST 12 MONTHS, YOU WORRIED THAT YOUR FOOD WOULD RUN OUT BEFORE YOU GOT MONEY TO BUY MORE.: NEVER TRUE

## 2025-02-09 SDOH — ECONOMIC STABILITY: INCOME INSECURITY: IN THE LAST 12 MONTHS, WAS THERE A TIME WHEN YOU WERE NOT ABLE TO PAY THE MORTGAGE OR RENT ON TIME?: NO

## 2025-02-12 ENCOUNTER — HOSPITAL ENCOUNTER (EMERGENCY)
Facility: HOSPITAL | Age: 40
Discharge: HOME | End: 2025-02-12
Attending: EMERGENCY MEDICINE
Payer: COMMERCIAL

## 2025-02-12 VITALS
SYSTOLIC BLOOD PRESSURE: 120 MMHG | TEMPERATURE: 98 F | DIASTOLIC BLOOD PRESSURE: 60 MMHG | HEIGHT: 65 IN | BODY MASS INDEX: 29.32 KG/M2 | RESPIRATION RATE: 18 BRPM | OXYGEN SATURATION: 100 % | HEART RATE: 60 BPM | WEIGHT: 176 LBS

## 2025-02-12 DIAGNOSIS — J06.9 ACUTE UPPER RESPIRATORY INFECTION: ICD-10-CM

## 2025-02-12 DIAGNOSIS — J06.9 VIRAL UPPER RESPIRATORY TRACT INFECTION: Primary | ICD-10-CM

## 2025-02-12 LAB
FLUAV RNA SPEC QL NAA+PROBE: NOT DETECTED
FLUBV RNA SPEC QL NAA+PROBE: NOT DETECTED
SARS-COV-2 RNA RESP QL NAA+PROBE: NOT DETECTED
SOURCE: NORMAL

## 2025-02-12 PROCEDURE — 99283 EMERGENCY DEPT VISIT LOW MDM: CPT

## 2025-02-12 PROCEDURE — 87636 SARSCOV2 & INF A&B AMP PRB: CPT

## 2025-02-12 RX ORDER — GUAIFENESIN/DEXTROMETHORPHAN 100-10MG/5
10 SYRUP ORAL EVERY 6 HOURS PRN
Qty: 120 ML | Refills: 0 | Status: SHIPPED | OUTPATIENT
Start: 2025-02-12

## 2025-02-12 RX ORDER — ECHINACEA PURPUREA EXTRACT 125 MG
2 TABLET ORAL
Qty: 15 ML | Refills: 0 | Status: SHIPPED | OUTPATIENT
Start: 2025-02-12

## 2025-02-12 ASSESSMENT — PAIN SCALES - GENERAL
PAINLEVEL_OUTOF10: 2
PAINLEVEL_OUTOF10: 4

## 2025-02-12 ASSESSMENT — PAIN DESCRIPTION - LOCATION: LOCATION: GENERALIZED

## 2025-02-12 ASSESSMENT — PAIN - FUNCTIONAL ASSESSMENT
PAIN_FUNCTIONAL_ASSESSMENT: 0-10
PAIN_FUNCTIONAL_ASSESSMENT: 0-10

## 2025-02-12 NOTE — ED TRIAGE NOTES
Patient presents to ER ambulatory with c/o generalized body aches and cough since Monday. 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

## 2025-02-12 NOTE — ED PROVIDER NOTES
START taking these medications    Details   guaiFENesin-dextromethorphan (ROBITUSSIN DM) 100-10 MG/5ML syrup Take 10 mLs by mouth every 6 hours as needed (Cough and congestion), Disp-120 mL, R-0Normal      sodium chloride (OCEAN) 0.65 % nasal spray 2 sprays by Nasal route every 3 hours as needed (Nasal congestion), Disp-15 mL, R-0Normal           Controlled Substances Monitoring:          No data to display                Dictation disclaimer: Please note that this dictation was completed with [employer], the [employer] voice recognition software.  Quite often unanticipated grammatical, syntax, homophones, and other interpretive errors are inadvertently transcribed by the computer software.  Please disregard these errors.  Please excuse any errors that have escaped final proofreading.    My signature above authenticates this document and my orders, the final    diagnosis (es), discharge prescription (s), and instructions in the Epic    record.       Luis Aguila DO (electronically signed)  Attending Emergency Physician          Luis Aguila DO  02/12/25 1945

## 2025-02-13 ENCOUNTER — TELEPHONE (OUTPATIENT)
Facility: CLINIC | Age: 40
End: 2025-02-13

## 2025-02-13 NOTE — TELEPHONE ENCOUNTER
Patient was scheduled for her annual exam on 2/12/2025, however when patient was brought the back she requested to be tested for the flu. Pt stated that on Monday 2/10/2025 her symptoms started, Cough, sore throat, body aches, chest pain and congestion. PCP was notified and informed the patient that since we do not test for Flu at this time she would need to reschedule and go to an urgent care to be tested. Pt was rescheduled for the following;    Future Appointments   Date Time Provider Department Center   3/6/2025  9:30 AM [facility]   7/16/2025  9:30 AM Roxy Flores, APRN - NP [facility]

## 2025-03-06 ENCOUNTER — OFFICE VISIT (OUTPATIENT)
Facility: CLINIC | Age: 40
End: 2025-03-06
Payer: COMMERCIAL

## 2025-03-06 VITALS
OXYGEN SATURATION: 99 % | WEIGHT: 177 LBS | HEIGHT: 65 IN | DIASTOLIC BLOOD PRESSURE: 84 MMHG | TEMPERATURE: 98.7 F | HEART RATE: 69 BPM | SYSTOLIC BLOOD PRESSURE: 123 MMHG | BODY MASS INDEX: 29.49 KG/M2 | RESPIRATION RATE: 18 BRPM

## 2025-03-06 DIAGNOSIS — K21.9 GASTROESOPHAGEAL REFLUX DISEASE WITHOUT ESOPHAGITIS: ICD-10-CM

## 2025-03-06 DIAGNOSIS — I10 PRIMARY HYPERTENSION: ICD-10-CM

## 2025-03-06 DIAGNOSIS — E78.5 DYSLIPIDEMIA: ICD-10-CM

## 2025-03-06 DIAGNOSIS — K59.01 SLOW TRANSIT CONSTIPATION: Primary | ICD-10-CM

## 2025-03-06 DIAGNOSIS — E55.9 VITAMIN D DEFICIENCY: ICD-10-CM

## 2025-03-06 PROBLEM — Z00.00 LABORATORY TESTS ORDERED AS PART OF A COMPLETE PHYSICAL EXAM (CPE): Status: RESOLVED | Noted: 2022-04-22 | Resolved: 2025-03-06

## 2025-03-06 PROCEDURE — 3074F SYST BP LT 130 MM HG: CPT | Performed by: NURSE PRACTITIONER

## 2025-03-06 PROCEDURE — 99214 OFFICE O/P EST MOD 30 MIN: CPT | Performed by: NURSE PRACTITIONER

## 2025-03-06 PROCEDURE — 3079F DIAST BP 80-89 MM HG: CPT | Performed by: NURSE PRACTITIONER

## 2025-03-06 RX ORDER — NORETHINDRONE 0.35 MG/1
TABLET ORAL
COMMUNITY
Start: 2025-03-05

## 2025-03-06 NOTE — ASSESSMENT & PLAN NOTE
Her constipation is likely due to inadequate hydration.     She has been advised to reduce her fiber intake by half and incorporate magnesium into her regimen. If this proves effective, she may further reduce her fiber intake by another half.     She has been counseled to increase her water consumption to at least 48 ounces daily. A water reminder lotus on her [employer] has been recommended to help her achieve this goal.    4/17/2024:  Increase miralax to BID until she has a good BM then decrease to daily prn  Initiate fiber caps daily  Increase water intake  Stay active  F/u with GI

## 2025-03-06 NOTE — PROGRESS NOTES
Sophy Ceballos is a 39 y.o. female (: 1985) presenting to address:    Chief Complaint   Patient presents with    Annual Exam       Vitals:    25 0913   BP: 123/84   Pulse: 69   Resp: 18   Temp: 98.7 °F (37.1 °C)   SpO2: 99%       \"Have you been to the ER, urgent care clinic since your last visit?  Hospitalized since your last visit?\"    YES - When: approximately 1 months ago.  Where and Why: URI.    “Have you seen or consulted any other health care providers outside of [facility] since your last visit?”    YES - When: approximately 1 days ago.  Where and Why: GYN.             
for Follow up (Vit D, CBC, CMP).      Dr. Kerry Jurado, ANAP-C, DNP  [facility]     The patient (or guardian, if applicable) and other individuals in attendance with the patient were advised that Artificial Intelligence will be utilized during this visit to record and process the conversation to generate a clinical note. The patient (or guardian, if applicable) and other individuals in attendance at the appointment consented to the use of AI, including the recording.

## 2025-03-06 NOTE — ASSESSMENT & PLAN NOTE
Resolved after bariatric surgery/losing weight  No longer requires amlodipine  Encouraged pt to monitor BP periodically and report if >140/90

## 2025-03-06 NOTE — ASSESSMENT & PLAN NOTE
Well-controlled, lifestyle modifications recommended  No longer requires PPI since losing weight via bariatric surgery  Avoid gut irritants such as spicy foods  Avoid laying down for 30 to 45 minutes after eating  Avoid excessive alcohol consumption

## 2025-03-06 NOTE — ASSESSMENT & PLAN NOTE
Vitamin D level 54  She has been advised to continue her over-the-counter vitamin D supplementation to maintain her levels in the 50s.  Recheck level 12 months

## 2025-05-09 ENCOUNTER — OFFICE VISIT (OUTPATIENT)
Age: 40
End: 2025-05-09

## 2025-05-09 VITALS
TEMPERATURE: 97.3 F | DIASTOLIC BLOOD PRESSURE: 76 MMHG | HEART RATE: 68 BPM | SYSTOLIC BLOOD PRESSURE: 118 MMHG | HEIGHT: 65 IN | BODY MASS INDEX: 28.79 KG/M2 | WEIGHT: 172.8 LBS | OXYGEN SATURATION: 100 %

## 2025-05-09 DIAGNOSIS — K90.9 INTESTINAL MALABSORPTION, UNSPECIFIED TYPE: Primary | ICD-10-CM

## 2025-05-09 DIAGNOSIS — K59.00 CONSTIPATION, UNSPECIFIED CONSTIPATION TYPE: ICD-10-CM

## 2025-05-09 DIAGNOSIS — Z98.84 S/P GASTRIC BYPASS: ICD-10-CM

## 2025-05-09 NOTE — PATIENT INSTRUCTIONS
You need to get more aggressive with your bowel regimen. Constipation is very common for several reasons including pain medications, protein shakes, decreased intake, etc.    The medications for constipation on this list are available over-the-counter at most drug or grocery stores.     GET THINGS MOVING   TAKE 1 capful or packet of Miralax (polyethylene glycol) mixed with at least 8 ounces of water or diet juice 2 times daily, AND / OR   TAKE 1 tablet of Senna-S (sennosides / docusate) 2 times daily.   Once you are regular, you may adjust as needed (for example, stop Senna-S and continue Miralax).   If you don't have a BM for a total of 3 days, move to Step 2.     2. KEEP THINGS MOVING   INCREASE Senna-S to 2 tablets 2 times daily, AND CONTINUE Miralax, taking 1 capful or packet mixed with at least 8 ounces of water or diet juice 2 times daily   Once you are regular, you may adjust as needed.   If you don't have a BM for a total of of 5 days, begin Step 3.     3. REALLY GET THINGS MOVING   ADD 1 dose (30 ml), of Milk of Magnesia (magnesium hydroxide).   If you are able to have a BM return to Step 2 until you are done using opioids or you have constipation or diarrhea. If you don't have a BM within 8 hours,     ADD 1 tablet (10 mg) of Dulcolax (bisacodyl) OR 1 rectal suppository.   If you are able to have a bowel movement return to Step 2.   If you don't have a BM,     TAKE another dose of Milk of Magnesia and 1 tablet of Dulcolax.   If you are able to have a BM return to Step 2.   If you don't have a BM or have continued symptoms, move to Step 4.     4. REALLY, REALLY GET THINGS MOVING   TAKE ½ to 1 bottle of magnesium citrate   Once you finally have a BM, return to Step 2   If you don't have a bowel movement while you are using opioids or symptoms of constipation continue, call the office.       ___ can mix 4 oz diet juice (Diet Ocean Spray, Diet V8 Tropical Splash or Minute Maid zero sugar) with 4+ oz water,

## 2025-05-09 NOTE — PROGRESS NOTES
Subjective:   Sophy Ceballos  is a 39 y.o. female who presents for follow-up about 22 months following laparoscopic gastric bypass surgery.   Surgery related complication: NA.    She has lost a total of 90 pounds since surgery.  Body mass index is 28.76 kg/m²..  Loss of EBW is 70%.      The patient presents today to assess their progress toward their weight loss goal & to address any issues that may be present:   She is tolerating solid foods without difficulty, reports constipation  and lower abdominal discomfort. Has not had BM in 1 week. Tried Miralax, Smooth Move Tea, magnesium citrate, CALM powder and suppository. She denies vomiting, nausea, difficulty swallowing, and reflux.  Fluid intake: fair                              Protein intake:  shakes + food; chicken, fish  Eating 1-2 times a day.  The patient is taking recommended vitamins.     The patient's exercise level: not active.      Changes in her medical history and medications have been reviewed.      Comorbidities:    Hypertension: resolved  Diabetes: N/A  Obstructive Sleep Apnea: N/A   Hyperlipidemia: unchanged  Stress Urinary Incontinence: N/A  Gastroesophageal Reflux:resolved  Weight related arthropathy:N/A    Patient Active Problem List   Diagnosis    GERD (gastroesophageal reflux disease)    Vitamin D deficiency    Dyslipidemia    Primary hypertension    S/P gastric bypass    Hiatal hernia    NAFLD (nonalcoholic fatty liver disease)    Intestinal malabsorption    Slow transit constipation     Past Medical History:   Diagnosis Date    Anal fissure     Chronic pain     Dyslipidemia 04/25/2022    Exercise tolerance finding 05/01/2023    Probably not able to go up and down two flights of stairs without SOB but no chest pain. Able to walk a mile without SOB or chest pain on level ground    GERD (gastroesophageal reflux disease)     avoids food triggers, ginger ale TUMS    Morbid (severe) obesity due to excess calories (HCC)     Morbid obesity

## 2025-05-27 ENCOUNTER — APPOINTMENT (OUTPATIENT)
Age: 40
End: 2025-05-27
Payer: COMMERCIAL

## 2025-05-27 ENCOUNTER — HOSPITAL ENCOUNTER (EMERGENCY)
Age: 40
Discharge: HOME | End: 2025-05-27
Attending: EMERGENCY MEDICINE
Payer: COMMERCIAL

## 2025-05-27 VITALS
BODY MASS INDEX: 28.32 KG/M2 | OXYGEN SATURATION: 100 % | WEIGHT: 170 LBS | DIASTOLIC BLOOD PRESSURE: 98 MMHG | HEIGHT: 65 IN | SYSTOLIC BLOOD PRESSURE: 129 MMHG | RESPIRATION RATE: 18 BRPM | HEART RATE: 75 BPM

## 2025-05-27 DIAGNOSIS — J06.9 ACUTE UPPER RESPIRATORY INFECTION: Primary | ICD-10-CM

## 2025-05-27 LAB
FLUAV RNA SPEC QL NAA+PROBE: NOT DETECTED
FLUBV RNA SPEC QL NAA+PROBE: NOT DETECTED
S PYO DNA THROAT QL NAA+PROBE: NOT DETECTED
SARS-COV-2 RNA RESP QL NAA+PROBE: NOT DETECTED
SOURCE: NORMAL

## 2025-05-27 PROCEDURE — 87651 STREP A DNA AMP PROBE: CPT

## 2025-05-27 PROCEDURE — 71046 X-RAY EXAM CHEST 2 VIEWS: CPT

## 2025-05-27 PROCEDURE — 87636 SARSCOV2 & INF A&B AMP PRB: CPT

## 2025-05-27 PROCEDURE — 99285 EMERGENCY DEPT VISIT HI MDM: CPT

## 2025-05-27 PROCEDURE — 6370000000 HC RX 637 (ALT 250 FOR IP): Performed by: EMERGENCY MEDICINE

## 2025-05-27 RX ORDER — ACETAMINOPHEN 500 MG
1000 TABLET ORAL
Status: COMPLETED | OUTPATIENT
Start: 2025-05-27 | End: 2025-05-27

## 2025-05-27 RX ORDER — BENZONATATE 200 MG/1
200 CAPSULE ORAL 3 TIMES DAILY PRN
Qty: 30 CAPSULE | Refills: 0 | Status: SHIPPED | OUTPATIENT
Start: 2025-05-27 | End: 2025-06-06

## 2025-05-27 RX ADMIN — ACETAMINOPHEN 1000 MG: 500 TABLET ORAL at 20:52

## 2025-05-27 ASSESSMENT — PAIN - FUNCTIONAL ASSESSMENT
PAIN_FUNCTIONAL_ASSESSMENT: NONE - DENIES PAIN
PAIN_FUNCTIONAL_ASSESSMENT: 0-10

## 2025-05-27 ASSESSMENT — PAIN DESCRIPTION - LOCATION: LOCATION: GENERALIZED

## 2025-05-27 ASSESSMENT — PAIN SCALES - GENERAL: PAINLEVEL_OUTOF10: 7

## 2025-05-28 NOTE — ED NOTES
BSV ED Patient Discharge      Pain assessment on discharge: 0  Discharge Destination Home  Discharge VIA ambulatory  Condition: Improved  Patient educated was completed: Yes  Teaching method used was discussed, handout given.   Understanding of teaching was: Good    Patient received narcotic pain medication? no  Patient received education on narcotic pain medication?   Patient has transportation home at bedside?

## 2025-05-28 NOTE — ED PROVIDER NOTES
[facility] EMERGENCY DEPARTMENT  EMERGENCY DEPARTMENT ENCOUNTER    Patient Name: Sophy Ceballos  MRN: [id number]  YOB: 1985  Provider: Bautista Srivastava DO  PCP: Kerry Jurado DNP   Time/Date of evaluation: 8:11 PM EDT on 25    History of Presenting Illness     History Provided by: Patient  History is limited by: Nothing     HISTORY:   Sophy Ceballos is a 39 y.o. female   Sophy Ceballos, a 39-year-old female, presents with the chief complaint of \"headache, cough, chest hurt, body hurt\". The onset of symptoms was on Thursday, approximately 5 days ago.  The patient reports a cough without mucus production, except when brushing teeth. She experiences chest pain when coughing and body aches. A sore throat was initially present but has since resolved. The patient denies any rash or urinary symptoms. She has not checked for fever throughout the course of illness.  The patient's son was recently ill but has recovered. No other sick contacts are mentioned.  Past Medical History:  Gastric bypass surgery (2020 at [facility])  Gallbladder removal ()  Surgical History:  Gastric bypass (2020)  Cholecystectomy ()  Medications:  None reported  Allergies:  NSAIDs (unable to take due to gastric bypass)  Social History:  Occupation:  for [employer]  Children: One son  Family History:  Mother: Alive, history of high blood pressure  Father: Alive, no reported medical problems  Obstetric History:   (one pregnancy, one live birth)  Nursing Notes were all reviewed and agreed with or any disagreements were addressed in the HPI.    Past History     PAST MEDICAL HISTORY:  Past Medical History:   Diagnosis Date    Anal fissure     Chronic pain     Dyslipidemia 2022    Exercise tolerance finding 2023    Probably not able to go up and down two flights of stairs without SOB but no chest pain. Able to walk a mile without SOB or chest pain on level

## 2025-05-29 LAB
EKG ATRIAL RATE: 64 BPM
EKG DIAGNOSIS: NORMAL
EKG P AXIS: 55 DEGREES
EKG P-R INTERVAL: 184 MS
EKG Q-T INTERVAL: 378 MS
EKG QRS DURATION: 98 MS
EKG QTC CALCULATION (BAZETT): 389 MS
EKG R AXIS: 32 DEGREES
EKG T AXIS: 26 DEGREES
EKG VENTRICULAR RATE: 64 BPM

## 2025-06-03 ENCOUNTER — TELEPHONE (OUTPATIENT)
Facility: CLINIC | Age: 40
End: 2025-06-03

## 2025-06-03 NOTE — TELEPHONE ENCOUNTER
Pt called stated she left a messg on the portal stated she went to the emergercy  room still having chest pain and her tongue hurts she wants to know can some antibiotics be called in

## 2025-06-03 NOTE — TELEPHONE ENCOUNTER
Patient was seen at the ED 5/27/2025 where she was diagnosed with upper respiratory infection-viral.  COVID, flu, strep all negative she was given Tessalon Perles for cough.  She will need to follow through with symptomatic care.  She needs to stay well-hydrated and may take Tylenol for aches and pains.  Antibiotics do not cover viruses.  If she continues to have symptoms or if her symptoms worsen, please have her either go to an urgent care for evaluation or back to the ED.  Unfortunately my schedule is completely booked today.  Thank you

## 2025-06-03 NOTE — TELEPHONE ENCOUNTER
Spoke w/ pt to inform. Pt stated that she went to the ED on 5/27/25 when her symptoms were ongoing for 1 week. Pt stated that is has now been 2 weeks and she still has a Cough, body aches, yellowish tint colored mucus, sinus pressure and ear popping when coughing.pt has been taking the tessalon capsules, however it only helps momentarily and never takes the cough away.

## 2025-07-16 ENCOUNTER — OFFICE VISIT (OUTPATIENT)
Age: 40
End: 2025-07-16
Payer: COMMERCIAL

## 2025-07-16 ENCOUNTER — HOSPITAL ENCOUNTER (OUTPATIENT)
Facility: HOSPITAL | Age: 40
Setting detail: SPECIMEN
Discharge: HOME | End: 2025-07-19

## 2025-07-16 VITALS
HEART RATE: 76 BPM | TEMPERATURE: 98.4 F | WEIGHT: 171.9 LBS | HEIGHT: 65 IN | BODY MASS INDEX: 28.64 KG/M2 | OXYGEN SATURATION: 100 %

## 2025-07-16 DIAGNOSIS — K59.01 SLOW TRANSIT CONSTIPATION: ICD-10-CM

## 2025-07-16 DIAGNOSIS — Z98.84 S/P GASTRIC BYPASS: ICD-10-CM

## 2025-07-16 DIAGNOSIS — K90.9 INTESTINAL MALABSORPTION, UNSPECIFIED TYPE: ICD-10-CM

## 2025-07-16 DIAGNOSIS — K90.9 INTESTINAL MALABSORPTION, UNSPECIFIED TYPE: Primary | ICD-10-CM

## 2025-07-16 LAB — SENTARA SPECIMEN COLLECTION: NORMAL

## 2025-07-16 PROCEDURE — 99214 OFFICE O/P EST MOD 30 MIN: CPT | Performed by: NURSE PRACTITIONER

## 2025-07-16 PROCEDURE — 99001 SPECIMEN HANDLING PT-LAB: CPT

## 2025-07-16 RX ORDER — LINACLOTIDE 145 UG/1
CAPSULE, GELATIN COATED ORAL
COMMUNITY
Start: 2025-06-15

## 2025-07-16 NOTE — PROGRESS NOTES
Subjective:   Sophy Ceballos is a 39 y.o. female is now 2 years status post laparoscopic gastric bypass surgery. Doing well overall.  She has lost a total of 91 pounds since surgery.  Body mass index is 28.61 kg/m².  Has lost 71% of EBW.    Surgical complications: NA     History of Present Illness  The patient presents for a 2-year follow-up visit from surgery.    She reports persistent constipation, which has improved since her last visit a few months ago. She experiences a sensation of fullness after eating, leading her to skip meals occasionally. She also reports occasional difficulty in swallowing certain foods, such as a burger she recently consumed at [employer]. Her diet typically includes chicken thighs, bread, and broccoli. She is currently on Linzess 145 mcg, taking 2 capsules daily, but still experiences irregular bowel movements. She also takes MiraLAX daily and uses a suppository. She recently underwent a colonoscopy, which showed no abnormalities.    Her goal weight is 150 pounds. She has resumed her protein shake regimen, consuming 2 shakes daily. She does not use any apps to track her food intake. She is not experiencing any skin rashes or irritation under the pannus or abdomen. She has expressed interest in skin removal surgery but was informed that her insurance would not cover it due to her pannus not hanging low enough. She occasionally wears a waist  but finds it uncomfortable.    She continues to take her prenatal vitamins twice daily, along with separate B12, vitamin D, and calcium supplements. She is also on birth control pills.             7/16/2025     9:26 AM 5/27/2025     6:34 PM 5/9/2025    10:19 AM 5/9/2025    10:18 AM 3/6/2025     9:13 AM 2/12/2025    10:05 AM 2/12/2025     8:30 AM   Ambulatory Bariatric Summary   Systolic  129 118 148 123 120 121   Diastolic  98 76 82 84 60 64   Pulse 76 75  68 69 60 65   Temp 98.4 °F (36.9 °C)   97.3 °F (36.3 °C) 98.7 °F (37.1 °C) 98 °F

## 2025-07-16 NOTE — PATIENT INSTRUCTIONS
"[employer]stic or My Fitness Pal Alejandra  80-90g protein  800-1000 calories   Keep total carbs below 50g    Our code to connect on [employer] is:   04272    Steps for pts to connect to alejandra:  If the patient does not already have the Soys[employer]-goic alejandra on their phone, they will need to go to the Alejandra Store (iPhone) or Play Store (android) and download the Sina[employer] alejandra to their phone.  They will sign up in the alejandra and then enter the code above.    If the patient already has the Sinaic alejandra on their phone, they will need to click on the main menu and then click on Connect to My Program. They then enter the code above and click Join.      Patient Instructions      Remember hydration goals - minimum of 64 ounces of liquids per day (dehydration is the number one reason for hospital readmission).  Continue to monitor carbohydrate and protein intake you need a minimum of  Grams of protein daily- remember to keep your total carbohydrates to 50 grams or less per day for best results.  Continue to work towards exercise goals - 60-90 minutes, 5 times a week minimum of deliberate, aerobic exercise is the ultimate goal with strength training 2 times each week. Refer to [employer] for  information.  Remember to take vitamins as directed.  Attend support group the 2nd Thursday of each month.  6.  Constipation: Milk of Magnesia is for immediate relief only.  Miralax is to be used every day if constipation is a chronic problem.    7.  Diarrhea: patients will occasionally develop lactose intolerance after surgery.  Check to see if your protein shake has whey in it.  If it does try a protein powder or drink that does not have whey and stop all yogurts, cheeses and milks to see if the diarrhea goes away.    8.  If you have had labs drawn.  We will only call you if you have abnormal results.  Otherwise you can access the lab results in \"mychart\".  You will only need the access code the first time you sign on.    9.  Call

## 2025-07-17 LAB
A/G RATIO: 1.5 RATIO (ref 1.1–2.6)
ALBUMIN: 4.3 G/DL (ref 3.5–5)
ALP BLD-CCNC: 65 U/L (ref 25–115)
ALT SERPL-CCNC: 16 U/L (ref 5–40)
ANION GAP SERPL CALCULATED.3IONS-SCNC: 11 MMOL/L (ref 3–15)
AST SERPL-CCNC: 24 U/L (ref 10–37)
BASOPHILS # BLD: 1 % (ref 0–2)
BASOPHILS ABSOLUTE: 0 K/UL (ref 0–0.2)
BILIRUB SERPL-MCNC: 0.4 MG/DL (ref 0.2–1.2)
BUN BLDV-MCNC: 10 MG/DL (ref 6–22)
CALCIUM SERPL-MCNC: 9.7 MG/DL (ref 8.4–10.5)
CHLORIDE BLD-SCNC: 104 MMOL/L (ref 98–110)
CO2: 24 MMOL/L (ref 20–32)
CREAT SERPL-MCNC: 0.7 MG/DL (ref 0.5–1.2)
EOSINOPHIL # BLD: 0 % (ref 0–6)
EOSINOPHILS ABSOLUTE: 0 K/UL (ref 0–0.5)
FERRITIN: 41 NG/ML (ref 10–291)
FOLATE: >20 NG/ML
GFR, ESTIMATED: >90 ML/MIN/1.73 SQ.M.
GLOBULIN: 2.8 G/DL (ref 2–4)
GLUCOSE: 94 MG/DL (ref 70–99)
HCT VFR BLD CALC: 40.7 % (ref 35.1–46.5)
HEMOGLOBIN: 13 G/DL (ref 11.7–15.5)
IRON: 86 MCG/DL (ref 30–160)
LYMPHOCYTES # BLD: 38 % (ref 20–45)
LYMPHOCYTES ABSOLUTE: 1.4 K/UL (ref 1–4.8)
MCH RBC QN AUTO: 30 PG (ref 26–34)
MCHC RBC AUTO-ENTMCNC: 32 G/DL (ref 31–36)
MCV RBC AUTO: 93 FL (ref 80–99)
MONOCYTES ABSOLUTE: 0.2 K/UL (ref 0.1–1)
MONOCYTES: 6 % (ref 3–12)
NEUTROPHILS ABSOLUTE: 2.1 K/UL (ref 1.8–7.7)
NEUTROPHILS SEGMENTED: 55 % (ref 40–75)
PDW BLD-RTO: 12.8 % (ref 10–15.5)
PLATELET # BLD: 289 K/UL (ref 140–440)
PMV BLD AUTO: 10.8 FL (ref 9–13)
POTASSIUM SERPL-SCNC: 4.2 MMOL/L (ref 3.5–5.5)
RBC # BLD: 4.36 M/UL (ref 3.8–5.2)
SODIUM BLD-SCNC: 139 MMOL/L (ref 133–145)
TOTAL PROTEIN: 7.1 G/DL (ref 6.4–8.3)
VITAMIN B-12: >2000 PG/ML (ref 211–911)
VITAMIN D 25-HYDROXY: 49.1 NG/ML (ref 32–100)
WBC # BLD: 3.8 K/UL (ref 4–11)

## 2025-07-21 LAB — THIAMINE BLOOD: 131 NMOL/L (ref 78–185)

## 2025-08-15 ENCOUNTER — OFFICE VISIT (OUTPATIENT)
Age: 40
End: 2025-08-15
Payer: COMMERCIAL

## 2025-08-15 VITALS
HEIGHT: 65 IN | BODY MASS INDEX: 28.76 KG/M2 | WEIGHT: 172.6 LBS | SYSTOLIC BLOOD PRESSURE: 115 MMHG | OXYGEN SATURATION: 100 % | DIASTOLIC BLOOD PRESSURE: 60 MMHG | TEMPERATURE: 97.4 F | HEART RATE: 61 BPM

## 2025-08-15 DIAGNOSIS — K90.9 INTESTINAL MALABSORPTION, UNSPECIFIED TYPE: Primary | ICD-10-CM

## 2025-08-15 DIAGNOSIS — Z98.84 S/P GASTRIC BYPASS: ICD-10-CM

## 2025-08-15 PROCEDURE — 99214 OFFICE O/P EST MOD 30 MIN: CPT | Performed by: SPECIALIST

## 2025-08-15 PROCEDURE — 3078F DIAST BP <80 MM HG: CPT | Performed by: SPECIALIST

## 2025-08-15 PROCEDURE — 3074F SYST BP LT 130 MM HG: CPT | Performed by: SPECIALIST

## (undated) DEVICE — GARMENT,MEDLINE,DVT,INT,CALF,MED, GEN2: Brand: MEDLINE

## (undated) DEVICE — STAPLER 60MM POWERED ECHELON 3000 LONG 440MM

## (undated) DEVICE — TROCAR ENDOSCP L100MM DIA15MM BLDELSS STBL SL ENDOPATH XCEL

## (undated) DEVICE — TROCAR ENDOSCP L100MM DIA12MM STBL SL BLDELSS ENDOPATH XCEL

## (undated) DEVICE — SOLUTION SURG PREP 26 CC PURPREP

## (undated) DEVICE — SOLUTION IRRIG 500ML 0.9% SOD CHLO USP POUR PLAS BTL

## (undated) DEVICE — KIT SUTURING DEVICE M-CLOSE

## (undated) DEVICE — SUTURE ETHIB EXCL BR GRN TAPR PT 2-0 30 X563H X563H

## (undated) DEVICE — ELECTRODE PT RET AD L9FT HI MOIST COND ADH HYDRGEL CORDED

## (undated) DEVICE — SEALANT TISS 10 CC FOR HUM FIBRIN VISTASEAL

## (undated) DEVICE — SYRINGE 20ML LL S/C 50

## (undated) DEVICE — [HIGH FLOW INSUFFLATOR,  DO NOT USE IF PACKAGE IS DAMAGED,  KEEP DRY,  KEEP AWAY FROM SUNLIGHT,  PROTECT FROM HEAT AND RADIOACTIVE SOURCES.]: Brand: PNEUMOSURE

## (undated) DEVICE — SOLUTION IV LACTATED RINGERS INJECTION USP

## (undated) DEVICE — COVER LT HNDL PLAS RIG 2 PER PK

## (undated) DEVICE — TUBING, SUCTION, 1/4" X 12', STRAIGHT: Brand: MEDLINE

## (undated) DEVICE — SLEEVE TRCR L100MM DIA5MM UNIV STBL FOR BLDELSS DIL TIP

## (undated) DEVICE — SUTURE NONABSORBABLE MONOFILAMENT 3-0 PS-1 18 IN BLK ETHILON 1663H

## (undated) DEVICE — STRIP SKIN CLSR W1XL5IN NYLON REINF CURAD STERIL

## (undated) DEVICE — CUTTER ENDOSCP L340MM LIN ARTC SGL STROKE FIRING ENDOPATH

## (undated) DEVICE — APPLIER CLP M/L SHFT DIA5MM 15 LIG LIGAMAX 5

## (undated) DEVICE — GLOVE ORANGE PI 7 1/2   MSG9075

## (undated) DEVICE — APPLIER SUT CLP FOR 2-0 3-0 4-0 VCRL ABSRB SUT

## (undated) DEVICE — SET TBNG DISP TIP FOR AHTO

## (undated) DEVICE — WOUND RETRACTOR AND PROTECTOR: Brand: ALEXIS WOUND PROTECTOR-RETRACTOR

## (undated) DEVICE — E-Z CLEAN, PTFE COATED, ELECTROSURGICAL LAPAROSCOPIC ELECTRODE, L-HOOK, 33 CM., SINGLE-USE, FOR USE WITH HAND CONTROL PENCIL: Brand: MEGADYNE

## (undated) DEVICE — FORCEPS BX OVL CUP SERR DISP CAP L 240CM RAD JAW 4

## (undated) DEVICE — SHEARS LAP L45CM DIA5MM ULTRASONIC CRV TIP ADV HEMSTAS HARM

## (undated) DEVICE — APPLICATOR LAP 35 CM 2 RIGID VISTASEAL

## (undated) DEVICE — Device

## (undated) DEVICE — SUTURE PDS II SZ 0 L27IN ABSRB VLT L26MM CT-2 1/2 CIR Z334H

## (undated) DEVICE — SUTURE MCRYL + SZ 4-0 L27IN ABSRB UD L19MM PS-2 3/8 CIR MCP426H

## (undated) DEVICE — TROCAR LAP L100MM DIA5MM BLDELSS W/ STBL SL ENDOPATH XCEL

## (undated) DEVICE — BARIATRIC: Brand: MEDLINE INDUSTRIES, INC.

## (undated) DEVICE — CATHETER CHOLGM 4.5FR L18IN W/ MTL SUPP TB

## (undated) DEVICE — TISSUE RETRIEVAL SYSTEM: Brand: INZII RETRIEVAL SYSTEM

## (undated) DEVICE — RELOAD STPL H1-2.5X45MM VASC THN TISS WHT 6 ROW B FRM SGL

## (undated) DEVICE — APPLIER CLP L SHFT DIA12MM 20 ROT MULT LIGACLP

## (undated) DEVICE — SYRINGE MED 30ML STD CLR PLAS LUERLOCK TIP N CTRL DISP

## (undated) DEVICE — STAPLER INT L37CM STPL 21MM CIR ENDOSCP CRV INTLUMN B FRM

## (undated) DEVICE — SUTURE ETHLN SZ 3-0 L30IN NONABSORBABLE BLK FSL L30MM 3/8 1671H

## (undated) DEVICE — TROCAR REPROC BLADELESS ENDOPATH XCEL 5X100MM

## (undated) DEVICE — ENDOSCOPY PUMP TUBING/ CAP SET: Brand: ERBE

## (undated) DEVICE — SOLUTION ANTIFOG VIS SYS CLEARIFY LAPSCP

## (undated) DEVICE — 1LYRTR 16FR10ML100%SIL UMS SNP: Brand: MEDLINE INDUSTRIES, INC.

## (undated) DEVICE — RELOAD STPL L60MM H1-2.6MM MESENTERY THN TISS WHT 6 ROW

## (undated) DEVICE — ENDOCUT SCISSOR TIP, DISPOSABLE: Brand: RENEW

## (undated) DEVICE — NEEDLE INSUF L150MM DIA2MM DISP FOR PNEUMOPERI ENDOPATH

## (undated) DEVICE — DRAPE C ARM UNIV W41XL74IN CLR PLAS XR VELC CLSR POLY STRP

## (undated) DEVICE — RELOAD STPL L60MM H1.5-3.6MM REG TISS BLU GRIPPING SURF B

## (undated) DEVICE — TROCAR ENDOSCP BLDELSS 12X100 MM W/ HNDL STBL SL OPT TIP

## (undated) DEVICE — SUTURE PROL SZ 2-0 L30IN NONABSORBABLE BLU L26MM CT-2 1/2 8411H

## (undated) DEVICE — CATHETER IV 14GA L1.75IN OD2.146MM ID1.740MM ORNG VIALON